# Patient Record
Sex: FEMALE | Race: WHITE | Employment: OTHER | URBAN - METROPOLITAN AREA
[De-identification: names, ages, dates, MRNs, and addresses within clinical notes are randomized per-mention and may not be internally consistent; named-entity substitution may affect disease eponyms.]

---

## 2018-07-06 DIAGNOSIS — I10 ESSENTIAL HYPERTENSION: ICD-10-CM

## 2018-07-06 DIAGNOSIS — E78.00 PURE HYPERCHOLESTEROLEMIA: ICD-10-CM

## 2018-07-06 RX ORDER — PRAVASTATIN SODIUM 20 MG
TABLET ORAL
Qty: 90 TABLET | Refills: 3 | Status: SHIPPED | OUTPATIENT
Start: 2018-07-06 | End: 2018-07-09 | Stop reason: SDUPTHER

## 2018-07-06 RX ORDER — VALSARTAN AND HYDROCHLOROTHIAZIDE 160; 12.5 MG/1; MG/1
TABLET, FILM COATED ORAL
Qty: 90 TABLET | Refills: 3 | Status: SHIPPED | OUTPATIENT
Start: 2018-07-06 | End: 2018-07-09 | Stop reason: SDUPTHER

## 2018-07-09 ENCOUNTER — TELEPHONE (OUTPATIENT)
Dept: INTERNAL MEDICINE CLINIC | Age: 78
End: 2018-07-09

## 2018-07-09 DIAGNOSIS — I10 ESSENTIAL HYPERTENSION: ICD-10-CM

## 2018-07-09 DIAGNOSIS — E78.00 PURE HYPERCHOLESTEROLEMIA: ICD-10-CM

## 2018-07-09 RX ORDER — PRAVASTATIN SODIUM 20 MG
TABLET ORAL
Qty: 90 TABLET | Refills: 3 | Status: SHIPPED | OUTPATIENT
Start: 2018-07-09 | End: 2019-09-04 | Stop reason: SDUPTHER

## 2018-07-09 RX ORDER — VALSARTAN AND HYDROCHLOROTHIAZIDE 160; 12.5 MG/1; MG/1
TABLET, FILM COATED ORAL
Qty: 90 TABLET | Refills: 3 | Status: SHIPPED | OUTPATIENT
Start: 2018-07-09 | End: 2018-07-19 | Stop reason: SINTOL

## 2018-07-12 ENCOUNTER — HOSPITAL ENCOUNTER (OUTPATIENT)
Dept: OTHER | Age: 78
Discharge: HOME | End: 2018-07-12
Attending: INTERNAL MEDICINE | Admitting: INTERNAL MEDICINE

## 2018-07-12 LAB
A/G RATIO: 1.6 (ref 1.1–2.2)
ALBUMIN SERPL-MCNC: 4.2 G/DL (ref 3.4–5)
ALP BLD-CCNC: 63 U/L (ref 40–129)
ALT SERPL-CCNC: 14 U/L (ref 10–40)
ANION GAP SERPL CALCULATED.3IONS-SCNC: 13 MMOL/L (ref 3–16)
AST SERPL-CCNC: 15 U/L (ref 15–37)
BILIRUB SERPL-MCNC: 0.4 MG/DL (ref 0–1)
BUN BLDV-MCNC: 22 MG/DL (ref 7–20)
CALCIUM SERPL-MCNC: 9.5 MG/DL (ref 8.3–10.6)
CHLORIDE BLD-SCNC: 100 MMOL/L (ref 99–110)
CHOLESTEROL, TOTAL: 179 MG/DL (ref 0–199)
CO2: 28 MMOL/L (ref 21–32)
CREAT SERPL-MCNC: 0.8 MG/DL (ref 0.6–1.2)
GFR AFRICAN AMERICAN: >60
GFR NON-AFRICAN AMERICAN: >60
GLOBULIN: 2.7 G/DL
GLUCOSE BLD-MCNC: 109 MG/DL (ref 70–99)
HCT VFR BLD CALC: 42.5 % (ref 36–48)
HDLC SERPL-MCNC: 72 MG/DL (ref 40–60)
HEMOGLOBIN: 15 G/DL (ref 12–16)
LDL CHOLESTEROL CALCULATED: 86 MG/DL
MCH RBC QN AUTO: 33.5 PG (ref 26–34)
MCHC RBC AUTO-ENTMCNC: 35.3 G/DL (ref 31–36)
MCV RBC AUTO: 95.1 FL (ref 80–100)
PDW BLD-RTO: 13.7 % (ref 12.4–15.4)
PLATELET # BLD: 265 K/UL (ref 135–450)
PMV BLD AUTO: 9.5 FL (ref 5–10.5)
POTASSIUM SERPL-SCNC: 3.8 MMOL/L (ref 3.5–5.1)
RBC # BLD: 4.47 M/UL (ref 4–5.2)
SODIUM BLD-SCNC: 141 MMOL/L (ref 136–145)
TOTAL PROTEIN: 6.9 G/DL (ref 6.4–8.2)
TRIGL SERPL-MCNC: 106 MG/DL (ref 0–150)
VLDLC SERPL CALC-MCNC: 21 MG/DL
WBC # BLD: 7.6 K/UL (ref 4–11)

## 2018-07-19 ENCOUNTER — OFFICE VISIT (OUTPATIENT)
Dept: INTERNAL MEDICINE CLINIC | Age: 78
End: 2018-07-19

## 2018-07-19 VITALS
WEIGHT: 152 LBS | SYSTOLIC BLOOD PRESSURE: 138 MMHG | HEIGHT: 62 IN | HEART RATE: 68 BPM | BODY MASS INDEX: 27.97 KG/M2 | DIASTOLIC BLOOD PRESSURE: 74 MMHG

## 2018-07-19 DIAGNOSIS — I10 ESSENTIAL HYPERTENSION: Primary | ICD-10-CM

## 2018-07-19 DIAGNOSIS — E78.00 PURE HYPERCHOLESTEROLEMIA: ICD-10-CM

## 2018-07-19 PROCEDURE — 99214 OFFICE O/P EST MOD 30 MIN: CPT | Performed by: INTERNAL MEDICINE

## 2018-07-19 RX ORDER — LISINOPRIL AND HYDROCHLOROTHIAZIDE 25; 20 MG/1; MG/1
1 TABLET ORAL DAILY
Qty: 30 TABLET | Refills: 3 | Status: SHIPPED | OUTPATIENT
Start: 2018-07-19 | End: 2018-08-17 | Stop reason: SDUPTHER

## 2018-07-19 ASSESSMENT — PATIENT HEALTH QUESTIONNAIRE - PHQ9
2. FEELING DOWN, DEPRESSED OR HOPELESS: 0
1. LITTLE INTEREST OR PLEASURE IN DOING THINGS: 0
SUM OF ALL RESPONSES TO PHQ9 QUESTIONS 1 & 2: 0
SUM OF ALL RESPONSES TO PHQ QUESTIONS 1-9: 0

## 2018-07-19 NOTE — PROGRESS NOTES
07/12/2018     07/12/2018    CO2 28 07/12/2018    BUN 22 (H) 07/12/2018    CREATININE 0.8 07/12/2018    GLUCOSE 109 (H) 07/12/2018    CALCIUM 9.5 07/12/2018    PROT 6.9 07/12/2018    LABALBU 4.2 07/12/2018    BILITOT 0.4 07/12/2018    ALKPHOS 63 07/12/2018    AST 15 07/12/2018    ALT 14 07/12/2018    LABGLOM >60 07/12/2018    GFRAA >60 07/12/2018    AGRATIO 1.6 07/12/2018    GLOB 2.7 07/12/2018       Lab Results   Component Value Date    CHOL 179 07/12/2018    CHOL 181 07/26/2016    CHOL 161 11/20/2015     Lab Results   Component Value Date    TRIG 106 07/12/2018    TRIG 86 07/26/2016    TRIG 78 11/20/2015     Lab Results   Component Value Date    HDL 72 (H) 07/12/2018    HDL 68 (H) 07/26/2016    HDL 68 (H) 11/20/2015     Lab Results   Component Value Date    LDLCALC 86 07/12/2018    LDLCALC 96 07/26/2016    LDLCALC 77 11/20/2015     Lab Results   Component Value Date    LABVLDL 21 07/12/2018    LABVLDL 17 07/26/2016    LABVLDL 16 11/20/2015     No results found for: CHOLHDLRATIO    Assessment:      Patient Active Problem List   Diagnosis    Pure hypercholesterolemia    Essential hypertension    Colon cancer screening    Encounter for routine gynecological examination    Breast cancer screening    Osteoporosis screening           Plan:       Advised pt to check with her pharmacy provider to see if the valsartan recall affects her meds  Pharmacy advises switch, so will use lisinopril HCT instead    BP check only in 2 weeks    F/u in 1 month   has 4 daughters are who are going to be nurses    Jesse Sood    HM: UTD  Annual fluvax everyFall  Consider Shingrix  tdap booster advised  Prevnar-13 advised

## 2018-07-19 NOTE — PATIENT INSTRUCTIONS
When Lisinopril HCT arrives, stop taking valsartan-HCTZ    BP check only in 2 weeks    F/u visit in 4 weeks to discuss the new med

## 2018-08-17 ENCOUNTER — TELEPHONE (OUTPATIENT)
Dept: INTERNAL MEDICINE CLINIC | Age: 78
End: 2018-08-17

## 2018-08-17 DIAGNOSIS — I10 ESSENTIAL HYPERTENSION: ICD-10-CM

## 2018-08-17 RX ORDER — LISINOPRIL AND HYDROCHLOROTHIAZIDE 25; 20 MG/1; MG/1
1 TABLET ORAL DAILY
Qty: 30 TABLET | Refills: 3 | Status: SHIPPED | OUTPATIENT
Start: 2018-08-17 | End: 2018-08-29 | Stop reason: SDUPTHER

## 2018-08-17 NOTE — TELEPHONE ENCOUNTER
Pt needs 30 day Rx of Lisinopril HCT sent to [facility] on [address]. There was a problem with [facility] and pt needs a refill in 3 days.

## 2018-08-29 ENCOUNTER — OFFICE VISIT (OUTPATIENT)
Dept: INTERNAL MEDICINE CLINIC | Age: 78
End: 2018-08-29

## 2018-08-29 VITALS
SYSTOLIC BLOOD PRESSURE: 132 MMHG | HEIGHT: 62 IN | DIASTOLIC BLOOD PRESSURE: 70 MMHG | BODY MASS INDEX: 27.6 KG/M2 | WEIGHT: 150 LBS | HEART RATE: 84 BPM

## 2018-08-29 DIAGNOSIS — I10 ESSENTIAL HYPERTENSION: ICD-10-CM

## 2018-08-29 PROCEDURE — 99213 OFFICE O/P EST LOW 20 MIN: CPT | Performed by: INTERNAL MEDICINE

## 2018-08-29 RX ORDER — LISINOPRIL AND HYDROCHLOROTHIAZIDE 25; 20 MG/1; MG/1
1 TABLET ORAL DAILY
Qty: 90 TABLET | Refills: 3 | Status: SHIPPED | OUTPATIENT
Start: 2018-08-29 | End: 2019-09-04 | Stop reason: SDUPTHER

## 2018-08-29 NOTE — PROGRESS NOTES
07/12/2018     07/12/2018     Lab Results   Component Value Date     07/12/2018    K 3.8 07/12/2018     07/12/2018    CO2 28 07/12/2018    BUN 22 (H) 07/12/2018    CREATININE 0.8 07/12/2018    GLUCOSE 109 (H) 07/12/2018    CALCIUM 9.5 07/12/2018    PROT 6.9 07/12/2018    LABALBU 4.2 07/12/2018    BILITOT 0.4 07/12/2018    ALKPHOS 63 07/12/2018    AST 15 07/12/2018    ALT 14 07/12/2018    LABGLOM >60 07/12/2018    GFRAA >60 07/12/2018    AGRATIO 1.6 07/12/2018    GLOB 2.7 07/12/2018       Lab Results   Component Value Date    CHOL 179 07/12/2018    CHOL 181 07/26/2016    CHOL 161 11/20/2015     Lab Results   Component Value Date    TRIG 106 07/12/2018    TRIG 86 07/26/2016    TRIG 78 11/20/2015     Lab Results   Component Value Date    HDL 72 (H) 07/12/2018    HDL 68 (H) 07/26/2016    HDL 68 (H) 11/20/2015     Lab Results   Component Value Date    LDLCALC 86 07/12/2018    LDLCALC 96 07/26/2016    LDLCALC 77 11/20/2015     Lab Results   Component Value Date    LABVLDL 21 07/12/2018    LABVLDL 17 07/26/2016    LABVLDL 16 11/20/2015     No results found for: CHOLHDLRATIO      Assessment:      Patient Active Problem List   Diagnosis    Pure hypercholesterolemia    Essential hypertension    Colon cancer screening    Encounter for routine gynecological examination    Breast cancer screening    Osteoporosis screening           Plan:      Continue current meds  Wants 90 day Prinzide to [facility]      High fiber diet for constipation, doubt that Lisinopril would affect bowel function    F/u in 5 months with labs prior as planned    Neisha Coyne MD

## 2019-08-16 ENCOUNTER — TELEPHONE (OUTPATIENT)
Dept: FAMILY MEDICINE CLINIC | Age: 79
End: 2019-08-16

## 2019-09-04 DIAGNOSIS — I10 ESSENTIAL HYPERTENSION: ICD-10-CM

## 2019-09-04 DIAGNOSIS — E78.00 PURE HYPERCHOLESTEROLEMIA: ICD-10-CM

## 2019-09-04 RX ORDER — PRAVASTATIN SODIUM 20 MG
TABLET ORAL
Qty: 90 TABLET | Refills: 3 | Status: SHIPPED | OUTPATIENT
Start: 2019-09-04 | End: 2020-08-31

## 2019-09-04 RX ORDER — LISINOPRIL AND HYDROCHLOROTHIAZIDE 25; 20 MG/1; MG/1
1 TABLET ORAL DAILY
Qty: 90 TABLET | Refills: 3 | Status: SHIPPED | OUTPATIENT
Start: 2019-09-04 | End: 2020-08-31

## 2019-09-05 ENCOUNTER — HOSPITAL ENCOUNTER (OUTPATIENT)
Age: 79
Discharge: HOME | End: 2019-09-05
Payer: MEDICARE

## 2019-09-05 DIAGNOSIS — E78.00 PURE HYPERCHOLESTEROLEMIA: ICD-10-CM

## 2019-09-05 DIAGNOSIS — I10 ESSENTIAL HYPERTENSION: ICD-10-CM

## 2019-09-05 LAB
A/G RATIO: 1.4 (ref 1.1–2.2)
ALBUMIN SERPL-MCNC: 4.2 G/DL (ref 3.4–5)
ALP BLD-CCNC: 62 U/L (ref 40–129)
ALT SERPL-CCNC: 15 U/L (ref 10–40)
ANION GAP SERPL CALCULATED.3IONS-SCNC: 15 MMOL/L (ref 3–16)
AST SERPL-CCNC: 21 U/L (ref 15–37)
BILIRUB SERPL-MCNC: 0.5 MG/DL (ref 0–1)
BUN BLDV-MCNC: 24 MG/DL (ref 7–20)
CALCIUM SERPL-MCNC: 10.5 MG/DL (ref 8.3–10.6)
CHLORIDE BLD-SCNC: 97 MMOL/L (ref 99–110)
CHOLESTEROL, FASTING: 184 MG/DL (ref 0–199)
CO2: 27 MMOL/L (ref 21–32)
CREAT SERPL-MCNC: 0.8 MG/DL (ref 0.6–1.2)
GFR AFRICAN AMERICAN: >60
GFR NON-AFRICAN AMERICAN: >60
GLOBULIN: 3.1 G/DL
GLUCOSE FASTING: 127 MG/DL (ref 70–99)
HCT VFR BLD CALC: 43.1 % (ref 36–48)
HDLC SERPL-MCNC: 68 MG/DL (ref 40–60)
HEMOGLOBIN: 14.7 G/DL (ref 12–16)
LDL CHOLESTEROL CALCULATED: 97 MG/DL
MCH RBC QN AUTO: 33.1 PG (ref 26–34)
MCHC RBC AUTO-ENTMCNC: 34.1 G/DL (ref 31–36)
MCV RBC AUTO: 97 FL (ref 80–100)
PDW BLD-RTO: 13.7 % (ref 12.4–15.4)
PLATELET # BLD: 286 K/UL (ref 135–450)
PMV BLD AUTO: 9.3 FL (ref 5–10.5)
POTASSIUM SERPL-SCNC: 3.4 MMOL/L (ref 3.5–5.1)
RBC # BLD: 4.44 M/UL (ref 4–5.2)
SODIUM BLD-SCNC: 139 MMOL/L (ref 136–145)
TOTAL PROTEIN: 7.3 G/DL (ref 6.4–8.2)
TRIGLYCERIDE, FASTING: 93 MG/DL (ref 0–150)
VLDLC SERPL CALC-MCNC: 19 MG/DL
WBC # BLD: 7.4 K/UL (ref 4–11)

## 2019-09-05 PROCEDURE — 80061 LIPID PANEL: CPT

## 2019-09-05 PROCEDURE — 36415 COLL VENOUS BLD VENIPUNCTURE: CPT

## 2019-09-05 PROCEDURE — 80053 COMPREHEN METABOLIC PANEL: CPT

## 2019-09-05 PROCEDURE — 85027 COMPLETE CBC AUTOMATED: CPT

## 2019-09-17 ENCOUNTER — OFFICE VISIT (OUTPATIENT)
Dept: PRIMARY CARE CLINIC | Age: 79
End: 2019-09-17
Payer: MEDICARE

## 2019-09-17 VITALS
WEIGHT: 147.6 LBS | SYSTOLIC BLOOD PRESSURE: 136 MMHG | DIASTOLIC BLOOD PRESSURE: 84 MMHG | OXYGEN SATURATION: 98 % | HEIGHT: 61 IN | HEART RATE: 81 BPM | TEMPERATURE: 97.8 F | BODY MASS INDEX: 27.87 KG/M2

## 2019-09-17 DIAGNOSIS — E78.00 PURE HYPERCHOLESTEROLEMIA: Primary | ICD-10-CM

## 2019-09-17 DIAGNOSIS — I10 ESSENTIAL HYPERTENSION: ICD-10-CM

## 2019-09-17 PROCEDURE — 99213 OFFICE O/P EST LOW 20 MIN: CPT | Performed by: INTERNAL MEDICINE

## 2019-09-17 ASSESSMENT — PATIENT HEALTH QUESTIONNAIRE - PHQ9
SUM OF ALL RESPONSES TO PHQ QUESTIONS 1-9: 0
2. FEELING DOWN, DEPRESSED OR HOPELESS: 0
1. LITTLE INTEREST OR PLEASURE IN DOING THINGS: 0
SUM OF ALL RESPONSES TO PHQ QUESTIONS 1-9: 0
SUM OF ALL RESPONSES TO PHQ9 QUESTIONS 1 & 2: 0

## 2020-08-31 RX ORDER — LISINOPRIL AND HYDROCHLOROTHIAZIDE 25; 20 MG/1; MG/1
TABLET ORAL
Qty: 90 TABLET | Refills: 3 | Status: SHIPPED | OUTPATIENT
Start: 2020-08-31 | End: 2021-07-20

## 2020-08-31 RX ORDER — PRAVASTATIN SODIUM 20 MG
TABLET ORAL
Qty: 90 TABLET | Refills: 3 | Status: SHIPPED | OUTPATIENT
Start: 2020-08-31 | End: 2021-07-20

## 2021-06-30 ENCOUNTER — TELEPHONE (OUTPATIENT)
Dept: PRIMARY CARE CLINIC | Age: 81
End: 2021-06-30

## 2021-07-20 DIAGNOSIS — I10 ESSENTIAL HYPERTENSION: ICD-10-CM

## 2021-07-20 DIAGNOSIS — E78.00 PURE HYPERCHOLESTEROLEMIA: ICD-10-CM

## 2021-07-20 RX ORDER — PRAVASTATIN SODIUM 20 MG
TABLET ORAL
Qty: 90 TABLET | Refills: 3 | Status: SHIPPED | OUTPATIENT
Start: 2021-07-20

## 2021-07-20 RX ORDER — LISINOPRIL AND HYDROCHLOROTHIAZIDE 25; 20 MG/1; MG/1
TABLET ORAL
Qty: 90 TABLET | Refills: 3 | Status: SHIPPED | OUTPATIENT
Start: 2021-07-20

## 2022-07-15 DIAGNOSIS — E78.00 PURE HYPERCHOLESTEROLEMIA: ICD-10-CM

## 2022-07-15 DIAGNOSIS — I10 ESSENTIAL HYPERTENSION: ICD-10-CM

## 2022-07-15 RX ORDER — LISINOPRIL AND HYDROCHLOROTHIAZIDE 25; 20 MG/1; MG/1
TABLET ORAL
Qty: 90 TABLET | Refills: 3 | OUTPATIENT
Start: 2022-07-15

## 2022-07-15 RX ORDER — PRAVASTATIN SODIUM 20 MG
TABLET ORAL
Qty: 90 TABLET | Refills: 3 | OUTPATIENT
Start: 2022-07-15

## 2023-03-10 ENCOUNTER — OFFICE VISIT (OUTPATIENT)
Dept: INTERNAL MEDICINE CLINIC | Age: 83
End: 2023-03-10
Payer: MEDICARE

## 2023-03-10 VITALS
HEIGHT: 61 IN | SYSTOLIC BLOOD PRESSURE: 150 MMHG | BODY MASS INDEX: 26.96 KG/M2 | WEIGHT: 142.8 LBS | DIASTOLIC BLOOD PRESSURE: 70 MMHG | HEART RATE: 94 BPM | OXYGEN SATURATION: 99 %

## 2023-03-10 DIAGNOSIS — I10 ESSENTIAL HYPERTENSION: ICD-10-CM

## 2023-03-10 DIAGNOSIS — H61.21 IMPACTED CERUMEN OF RIGHT EAR: ICD-10-CM

## 2023-03-10 DIAGNOSIS — Z12.39 BREAST SCREENING: ICD-10-CM

## 2023-03-10 DIAGNOSIS — H18.521: ICD-10-CM

## 2023-03-10 DIAGNOSIS — I10 ESSENTIAL HYPERTENSION: Primary | ICD-10-CM

## 2023-03-10 DIAGNOSIS — R01.1 HEART MURMUR: ICD-10-CM

## 2023-03-10 DIAGNOSIS — E78.00 PURE HYPERCHOLESTEROLEMIA: ICD-10-CM

## 2023-03-10 LAB
HCT VFR BLD CALC: 47 % (ref 36–48)
HEMOGLOBIN: 16 G/DL (ref 12–16)
MCH RBC QN AUTO: 32.2 PG (ref 26–34)
MCHC RBC AUTO-ENTMCNC: 34 G/DL (ref 31–36)
MCV RBC AUTO: 95 FL (ref 80–100)
PDW BLD-RTO: 13.6 % (ref 12.4–15.4)
PLATELET # BLD: 275 K/UL (ref 135–450)
PMV BLD AUTO: 9.3 FL (ref 5–10.5)
RBC # BLD: 4.95 M/UL (ref 4–5.2)
WBC # BLD: 7.3 K/UL (ref 4–11)

## 2023-03-10 PROCEDURE — 3078F DIAST BP <80 MM HG: CPT | Performed by: INTERNAL MEDICINE

## 2023-03-10 PROCEDURE — 3077F SYST BP >= 140 MM HG: CPT | Performed by: INTERNAL MEDICINE

## 2023-03-10 PROCEDURE — 69210 REMOVE IMPACTED EAR WAX UNI: CPT | Performed by: INTERNAL MEDICINE

## 2023-03-10 PROCEDURE — 1123F ACP DISCUSS/DSCN MKR DOCD: CPT | Performed by: INTERNAL MEDICINE

## 2023-03-10 PROCEDURE — 99204 OFFICE O/P NEW MOD 45 MIN: CPT | Performed by: INTERNAL MEDICINE

## 2023-03-10 RX ORDER — ANTIARTHRITIC COMBINATION NO.2 900 MG
TABLET ORAL DAILY
COMMUNITY

## 2023-03-10 RX ORDER — PRAVASTATIN SODIUM 20 MG
20 TABLET ORAL DAILY
Qty: 90 TABLET | Refills: 1 | Status: SHIPPED | OUTPATIENT
Start: 2023-03-10

## 2023-03-10 RX ORDER — HYDROCHLOROTHIAZIDE 25 MG/1
25 TABLET ORAL EVERY MORNING
Qty: 90 TABLET | Refills: 1 | Status: SHIPPED | OUTPATIENT
Start: 2023-03-10

## 2023-03-10 RX ORDER — LISINOPRIL 20 MG/1
20 TABLET ORAL DAILY
Qty: 90 TABLET | Refills: 1 | Status: SHIPPED | OUTPATIENT
Start: 2023-03-10

## 2023-03-10 SDOH — ECONOMIC STABILITY: HOUSING INSECURITY
IN THE LAST 12 MONTHS, WAS THERE A TIME WHEN YOU DID NOT HAVE A STEADY PLACE TO SLEEP OR SLEPT IN A SHELTER (INCLUDING NOW)?: NO

## 2023-03-10 SDOH — ECONOMIC STABILITY: FOOD INSECURITY: WITHIN THE PAST 12 MONTHS, THE FOOD YOU BOUGHT JUST DIDN'T LAST AND YOU DIDN'T HAVE MONEY TO GET MORE.: NEVER TRUE

## 2023-03-10 SDOH — ECONOMIC STABILITY: INCOME INSECURITY: HOW HARD IS IT FOR YOU TO PAY FOR THE VERY BASICS LIKE FOOD, HOUSING, MEDICAL CARE, AND HEATING?: NOT HARD AT ALL

## 2023-03-10 SDOH — ECONOMIC STABILITY: FOOD INSECURITY: WITHIN THE PAST 12 MONTHS, YOU WORRIED THAT YOUR FOOD WOULD RUN OUT BEFORE YOU GOT MONEY TO BUY MORE.: NEVER TRUE

## 2023-03-10 ASSESSMENT — ENCOUNTER SYMPTOMS
BACK PAIN: 0
WHEEZING: 0
SORE THROAT: 0
NAUSEA: 0
ABDOMINAL PAIN: 0
COUGH: 0
SHORTNESS OF BREATH: 0
VOMITING: 0
CONSTIPATION: 0
CHEST TIGHTNESS: 0
COLOR CHANGE: 0

## 2023-03-10 ASSESSMENT — PATIENT HEALTH QUESTIONNAIRE - PHQ9
1. LITTLE INTEREST OR PLEASURE IN DOING THINGS: 0
SUM OF ALL RESPONSES TO PHQ QUESTIONS 1-9: 1
SUM OF ALL RESPONSES TO PHQ QUESTIONS 1-9: 1
2. FEELING DOWN, DEPRESSED OR HOPELESS: 1
SUM OF ALL RESPONSES TO PHQ QUESTIONS 1-9: 1
SUM OF ALL RESPONSES TO PHQ QUESTIONS 1-9: 1
SUM OF ALL RESPONSES TO PHQ9 QUESTIONS 1 & 2: 1

## 2023-03-10 NOTE — ASSESSMENT & PLAN NOTE
Has been on low-dose Pravachol, will update labs and make adjustment to dose accordingly, reinforced diet and exercise recommendations

## 2023-03-10 NOTE — ASSESSMENT & PLAN NOTE
Murmur audible on exam, patient not aware of previous history of murmur, she is asymptomatic and denies any dizzy spells, dyspnea on exertion or palpitations. Advised will check echocardiogram to evaluate for valvular insufficiency as an underlying etiology for her murmur in addition to updating lab work.

## 2023-03-10 NOTE — ASSESSMENT & PLAN NOTE
Blood pressure checked twice with elevated systolic, patient reports has not had any medication for over a week, requesting to separate hydrochlorothiazide and lisinopril, new order sent to pharmacy, reinforced recommendations for salt restriction, maintaining healthy low-fat low-carb diet with regular exercise and active lifestyle, will update lab work, compliance with meds and follow-up visit reinforced, will reevaluate in 6 weeks as she will return for a wellness exam

## 2023-03-10 NOTE — PROGRESS NOTES
ASSESSMENT/PLAN:  1. Essential hypertension  Assessment & Plan:   Blood pressure checked twice with elevated systolic, patient reports has not had any medication for over a week, requesting to separate hydrochlorothiazide and lisinopril, new order sent to pharmacy, reinforced recommendations for salt restriction, maintaining healthy low-fat low-carb diet with regular exercise and active lifestyle, will update lab work, compliance with meds and follow-up visit reinforced, will reevaluate in 6 weeks as she will return for a wellness exam  Orders:  -     CBC; Future  -     Comprehensive Metabolic Panel; Future  -     Lipid Panel; Future  -     TSH with Reflex to FT4; Future  -     lisinopril (PRINIVIL;ZESTRIL) 20 MG tablet; Take 1 tablet by mouth daily, Disp-90 tablet, R-1Normal  -     hydroCHLOROthiazide (HYDRODIURIL) 25 MG tablet; Take 1 tablet by mouth every morning, Disp-90 tablet, R-1Normal  -     Echocardiogram complete; Future  2. Pure hypercholesterolemia  Assessment & Plan:   Has been on low-dose Pravachol, will update labs and make adjustment to dose accordingly, reinforced diet and exercise recommendations  Orders:  -     CBC; Future  -     Comprehensive Metabolic Panel; Future  -     Lipid Panel; Future  -     TSH with Reflex to FT4; Future  -     pravastatin (PRAVACHOL) 20 MG tablet; Take 1 tablet by mouth daily, Disp-90 tablet, R-1Normal  3. Heart murmur  Assessment & Plan:   Murmur audible on exam, patient not aware of previous history of murmur, she is asymptomatic and denies any dizzy spells, dyspnea on exertion or palpitations. Advised will check echocardiogram to evaluate for valvular insufficiency as an underlying etiology for her murmur in addition to updating lab work. Orders:  -     Echocardiogram complete; Future  4. Impacted cerumen of right ear  Assessment & Plan:  Ear lavage done and big chunk of wax removed with aid of lighted curette.   Advised patient can use over-the-counter Debrox to prevent further buildup and future recurrence, advised if concerned there is deeper wax then needs to see ENT    Orders:  -     [id number] - [place] REMOVE IMPACTED EAR WAX  5. Map-dot-fingerprint corneal dystrophy of right eye  Assessment & Plan:   Continue care and recommendation as per ophthalmology  6. Breast screening  -     AJRED REY DIGITAL SCREEN BILATERAL; Future    Return in about 6 weeks (around 4/21/2023) for AWV. SUBJECTIVE  HPI:   Patient here to establish new PCP office visits, used to see Dr. Julio Drew at [facility] location however has not seen him since 2019. She is an 19-year-old female, she is , lives by herself, she is independent with all of her ADLs and taking care of her household, reports she used to going to the doctor when the pandemic started, she was maintained on blood pressure and cholesterol pills and she kept getting refills up till recently when she was told no more refills will be given until she is seen. She states she has been taking her blood pressure pills on and off to stretch them to last until she finds a new doctor  She is a retired , she is generally in good health and denies any concerns. She has been following up with ophthalmology for corneal dystrophy, she had cataract surgery done in the past..  Reports usually her blood pressure is high at the doctor's office due to whitecoat syndrome      Review of Systems   Constitutional:  Negative for activity change, appetite change, fatigue and unexpected weight change. HENT:  Negative for congestion, hearing loss, mouth sores and sore throat. Eyes:  Positive for visual disturbance. Respiratory:  Negative for cough, chest tightness, shortness of breath and wheezing. Cardiovascular:  Negative for chest pain, palpitations and leg swelling. Gastrointestinal:  Negative for abdominal pain, constipation, nausea and vomiting.    Genitourinary:  Negative for difficulty urinating, dysuria, frequency, hematuria, urgency, vaginal bleeding and vaginal discharge. Musculoskeletal:  Negative for arthralgias, back pain, gait problem and joint swelling. Skin:  Negative for color change and wound. Allergic/Immunologic: Negative for environmental allergies and immunocompromised state. Neurological:  Negative for dizziness, speech difficulty, weakness, light-headedness and headaches. Psychiatric/Behavioral:  Negative for behavioral problems, dysphoric mood and sleep disturbance. The patient is not nervous/anxious. OBJECTIVE:    BP (!) 150/70   Pulse 94   Ht 5' 1\" (1.549 m)   Wt 142 lb 12.8 oz (64.8 kg)   SpO2 99%   BMI 26.98 kg/m²    Physical Exam  Constitutional:       General: She is not in acute distress. Appearance: Normal appearance. She is normal weight. She is not toxic-appearing. HENT:      Head: Normocephalic. Right Ear: Tympanic membrane and ear canal normal. There is impacted cerumen. Left Ear: Tympanic membrane and ear canal normal.      Nose: Nose normal.      Mouth/Throat:      Mouth: Mucous membranes are moist.      Pharynx: Oropharynx is clear. Eyes:      Extraocular Movements: Extraocular movements intact. Conjunctiva/sclera: Conjunctivae normal.      Pupils: Pupils are equal, round, and reactive to light. Cardiovascular:      Rate and Rhythm: Normal rate and regular rhythm. Pulses: Normal pulses. Heart sounds: Murmur heard. Pulmonary:      Effort: Pulmonary effort is normal. No respiratory distress. Breath sounds: Normal breath sounds. No wheezing. Chest:      Chest wall: No tenderness. Abdominal:      General: Bowel sounds are normal.      Palpations: Abdomen is soft. There is no mass. Tenderness: There is no abdominal tenderness. There is no rebound. Musculoskeletal:         General: No swelling, deformity or signs of injury. Normal range of motion. Cervical back: Normal range of motion and neck supple.       Right lower leg: No edema. Left lower leg: No edema. Skin:     General: Skin is warm and dry. Neurological:      General: No focal deficit present. Mental Status: She is alert and oriented to person, place, and time. Mental status is at baseline. Cranial Nerves: No cranial nerve deficit. Gait: Gait normal.   Psychiatric:         Mood and Affect: Mood normal.         Behavior: Behavior normal.         Thought Content: Thought content normal.         Electronically signed by Ronda Mathews MD on 3/10/2023 at 10:50 AM.    This dictation was generated by voice recognition computer software. Although all attempts are made to edit the dictation for accuracy, there may be errors in the transcription that are not intended.

## 2023-03-11 LAB
A/G RATIO: 1.6 (ref 1.1–2.2)
ALBUMIN SERPL-MCNC: 4.6 G/DL (ref 3.4–5)
ALP BLD-CCNC: 84 U/L (ref 40–129)
ALT SERPL-CCNC: 18 U/L (ref 10–40)
ANION GAP SERPL CALCULATED.3IONS-SCNC: 17 MMOL/L (ref 3–16)
AST SERPL-CCNC: 23 U/L (ref 15–37)
BILIRUB SERPL-MCNC: 0.6 MG/DL (ref 0–1)
BUN BLDV-MCNC: 23 MG/DL (ref 7–20)
CALCIUM SERPL-MCNC: 10.4 MG/DL (ref 8.3–10.6)
CHLORIDE BLD-SCNC: 98 MMOL/L (ref 99–110)
CHOLESTEROL, TOTAL: 221 MG/DL (ref 0–199)
CO2: 26 MMOL/L (ref 21–32)
CREAT SERPL-MCNC: 0.8 MG/DL (ref 0.6–1.2)
GFR SERPL CREATININE-BSD FRML MDRD: >60 ML/MIN/{1.73_M2}
GLUCOSE BLD-MCNC: 113 MG/DL (ref 70–99)
HDLC SERPL-MCNC: 86 MG/DL (ref 40–60)
LDL CHOLESTEROL CALCULATED: 114 MG/DL
POTASSIUM SERPL-SCNC: 4.3 MMOL/L (ref 3.5–5.1)
SODIUM BLD-SCNC: 141 MMOL/L (ref 136–145)
TOTAL PROTEIN: 7.5 G/DL (ref 6.4–8.2)
TRIGL SERPL-MCNC: 105 MG/DL (ref 0–150)
TSH REFLEX FT4: 1.26 UIU/ML (ref 0.27–4.2)
VLDLC SERPL CALC-MCNC: 21 MG/DL

## 2023-04-24 ENCOUNTER — OFFICE VISIT (OUTPATIENT)
Dept: INTERNAL MEDICINE CLINIC | Age: 83
End: 2023-04-24
Payer: MEDICARE

## 2023-04-24 VITALS
BODY MASS INDEX: 27.3 KG/M2 | HEART RATE: 89 BPM | WEIGHT: 144.6 LBS | SYSTOLIC BLOOD PRESSURE: 160 MMHG | HEIGHT: 61 IN | DIASTOLIC BLOOD PRESSURE: 75 MMHG | OXYGEN SATURATION: 99 %

## 2023-04-24 DIAGNOSIS — I10 ESSENTIAL HYPERTENSION: ICD-10-CM

## 2023-04-24 DIAGNOSIS — Z00.00 ENCOUNTER FOR SUBSEQUENT ANNUAL WELLNESS VISIT (AWV) IN MEDICARE PATIENT: Primary | ICD-10-CM

## 2023-04-24 DIAGNOSIS — E78.00 PURE HYPERCHOLESTEROLEMIA: ICD-10-CM

## 2023-04-24 DIAGNOSIS — R01.1 HEART MURMUR: ICD-10-CM

## 2023-04-24 DIAGNOSIS — R73.01 IMPAIRED FASTING GLUCOSE: ICD-10-CM

## 2023-04-24 LAB — HBA1C MFR BLD: 5.6 %

## 2023-04-24 PROCEDURE — 1123F ACP DISCUSS/DSCN MKR DOCD: CPT | Performed by: INTERNAL MEDICINE

## 2023-04-24 PROCEDURE — 99214 OFFICE O/P EST MOD 30 MIN: CPT | Performed by: INTERNAL MEDICINE

## 2023-04-24 PROCEDURE — G0439 PPPS, SUBSEQ VISIT: HCPCS | Performed by: INTERNAL MEDICINE

## 2023-04-24 PROCEDURE — 3078F DIAST BP <80 MM HG: CPT | Performed by: INTERNAL MEDICINE

## 2023-04-24 PROCEDURE — 83036 HEMOGLOBIN GLYCOSYLATED A1C: CPT | Performed by: INTERNAL MEDICINE

## 2023-04-24 PROCEDURE — 3077F SYST BP >= 140 MM HG: CPT | Performed by: INTERNAL MEDICINE

## 2023-04-24 RX ORDER — LISINOPRIL 20 MG/1
20 TABLET ORAL 2 TIMES DAILY
Qty: 180 TABLET | Refills: 1 | Status: SHIPPED | OUTPATIENT
Start: 2023-04-24

## 2023-04-24 ASSESSMENT — ENCOUNTER SYMPTOMS
WHEEZING: 0
COLOR CHANGE: 0
CONSTIPATION: 0
ABDOMINAL PAIN: 0
BACK PAIN: 0
NAUSEA: 0
CHEST TIGHTNESS: 0
SHORTNESS OF BREATH: 0
SORE THROAT: 0
COUGH: 0
VOMITING: 0

## 2023-04-24 ASSESSMENT — PATIENT HEALTH QUESTIONNAIRE - PHQ9
1. LITTLE INTEREST OR PLEASURE IN DOING THINGS: 1
SUM OF ALL RESPONSES TO PHQ QUESTIONS 1-9: 2
SUM OF ALL RESPONSES TO PHQ9 QUESTIONS 1 & 2: 2
SUM OF ALL RESPONSES TO PHQ QUESTIONS 1-9: 2
2. FEELING DOWN, DEPRESSED OR HOPELESS: 1

## 2023-04-24 ASSESSMENT — LIFESTYLE VARIABLES
HOW MANY STANDARD DRINKS CONTAINING ALCOHOL DO YOU HAVE ON A TYPICAL DAY: 1 OR 2
HOW OFTEN DO YOU HAVE A DRINK CONTAINING ALCOHOL: MONTHLY OR LESS

## 2023-04-24 NOTE — ASSESSMENT & PLAN NOTE
Results of lipid profile explained to patient, will continue Pravachol for now which she is taking without any significant side effects, diet and exercise recommendations reinforced

## 2023-04-24 NOTE — ASSESSMENT & PLAN NOTE
Results of fasting blood sugar reviewed and discussed with the patient, hemoglobin A1c done in office this visit is 5.6, reinforced recommendations to maintain healthy low-carb diet and active lifestyle, will monitor periodically, no need for medication at this point

## 2023-04-24 NOTE — ASSESSMENT & PLAN NOTE
Suboptimally controlled, advised will increase lisinopril dose to twice a day, patient will continue ambulatory blood pressure checks, she will return in 4 weeks for blood pressure check. Reinforced recommendations to maintain low-salt diet with active lifestyle.   Continue hydrochlorothiazide with lisinopril in a.m. and add p.m. lisinopril

## 2023-04-24 NOTE — PROGRESS NOTES
Medicare Annual Wellness Visit  Name: Mirlande Chau Date: 2023   MRN: [id number] Sex: Female   Age: 79 y.o. Ethnicity: Non- / Non    : 1940 Race: White (non-)      Princess Khan is here for Medicare AWV     Screenings for behavioral, psychosocial and functional/safety risks, and cognitive dysfunction are all negative except as indicated below. These results, as well as other patient data from the [address] form, are documented in Flowsheets linked to this Encounter. Allergies   Allergen Reactions    Cortisone Rash       Prior to Visit Medications    Medication Sig Taking? Authorizing Provider   lisinopril (PRINIVIL;ZESTRIL) 20 MG tablet Take 1 tablet by mouth 2 times daily Yes Reji Cerda MD   Biotin 5000 MCG TABS Take by mouth daily Yes Historical Provider, MD   MAGNESIUM-ZINC PO Take by mouth 2 times daily Yes Historical Provider, MD   CALCIUM CITRATE PO Take by mouth daily Yes Historical Provider, MD   hydroCHLOROthiazide (HYDRODIURIL) 25 MG tablet Take 1 tablet by mouth every morning Yes Reji Cerda MD   pravastatin (PRAVACHOL) 20 MG tablet Take 1 tablet by mouth daily Yes Reji Cerda MD   aspirin 81 MG EC tablet Take 1 tablet by mouth daily Yes Historical Provider, MD   Multiple Vitamins-Minerals (CENTRUM SILVER PO) Take 1 tablet by mouth daily.    Yes Historical Provider, MD       Past Medical History:   Diagnosis Date    Hyperlipidemia     Hypertension      Past Surgical History:   Procedure Laterality Date    EYE SURGERY      TONSILLECTOMY AND ADENOIDECTOMY         Family History   Problem Relation Age of Onset    Heart Disease Mother     Diabetes Mother     High Blood Pressure Mother     Vision Loss Mother     Arthritis Father     Cancer Father     High Blood Pressure Father     Cancer Maternal Aunt 61        breast    High Blood Pressure Maternal Aunt     Cancer Maternal Uncle     High Blood Pressure Maternal Uncle     High

## 2023-05-22 ENCOUNTER — TELEPHONE (OUTPATIENT)
Dept: INTERNAL MEDICINE CLINIC | Age: 83
End: 2023-05-22

## 2023-05-22 ENCOUNTER — NURSE ONLY (OUTPATIENT)
Dept: INTERNAL MEDICINE CLINIC | Age: 83
End: 2023-05-22

## 2023-05-22 VITALS — HEART RATE: 93 BPM | SYSTOLIC BLOOD PRESSURE: 169 MMHG | DIASTOLIC BLOOD PRESSURE: 78 MMHG

## 2023-05-22 DIAGNOSIS — Z01.30 ENCOUNTER FOR CHECK OF BLOOD PRESSURE WHILE ON ORAL CONTRACEPTIVES: Primary | ICD-10-CM

## 2023-05-22 DIAGNOSIS — Z79.3 ENCOUNTER FOR CHECK OF BLOOD PRESSURE WHILE ON ORAL CONTRACEPTIVES: Primary | ICD-10-CM

## 2023-05-22 RX ORDER — AMLODIPINE BESYLATE 5 MG/1
5 TABLET ORAL DAILY
Qty: 30 TABLET | Refills: 0 | Status: SHIPPED | OUTPATIENT
Start: 2023-05-22

## 2023-05-22 NOTE — TELEPHONE ENCOUNTER
I have added amlodipine 5 mg daily as her BP is high. Take in the evening. Will also need to make a f/u with Dr. Александр Corey.

## 2023-05-22 NOTE — TELEPHONE ENCOUNTER
Patient came in today for nurse visit for BP check per Dr. Amaury Christianson recommendations last visit. Readings were 172/70 and 169/78. Patient aware Dr. Beatty Loop out of the office. Please advise.

## 2023-06-05 ENCOUNTER — TELEPHONE (OUTPATIENT)
Dept: INTERNAL MEDICINE CLINIC | Age: 83
End: 2023-06-05

## 2023-06-05 DIAGNOSIS — N64.4 BREAST PAIN, RIGHT: Primary | ICD-10-CM

## 2023-06-05 NOTE — TELEPHONE ENCOUNTER
Josefina Slider calling pt had mammogram screening order but she told them she is having pain in right breast so they need a Diagnostic Mammogram order and Breast US ---Thanks.

## 2023-06-26 ENCOUNTER — HOSPITAL ENCOUNTER (OUTPATIENT)
Dept: NON INVASIVE DIAGNOSTICS | Age: 83
Discharge: HOME | End: 2023-06-26
Payer: MEDICARE

## 2023-06-26 ENCOUNTER — TELEPHONE (OUTPATIENT)
Dept: INTERNAL MEDICINE CLINIC | Age: 83
End: 2023-06-26

## 2023-06-26 DIAGNOSIS — R01.1 HEART MURMUR: Primary | ICD-10-CM

## 2023-06-26 DIAGNOSIS — R01.1 HEART MURMUR: ICD-10-CM

## 2023-06-26 LAB
LV EF: 63 %
LVEF MODALITY: NORMAL

## 2023-06-26 PROCEDURE — 93306 TTE W/DOPPLER COMPLETE: CPT

## 2023-06-27 ENCOUNTER — TELEPHONE (OUTPATIENT)
Dept: INTERNAL MEDICINE CLINIC | Age: 83
End: 2023-06-27

## 2023-06-27 DIAGNOSIS — N64.59 OTHER SIGNS AND SYMPTOMS IN BREAST: ICD-10-CM

## 2023-06-27 DIAGNOSIS — N63.0 MASS OF BREAST, UNSPECIFIED LATERALITY: Primary | ICD-10-CM

## 2023-06-28 ENCOUNTER — HOSPITAL ENCOUNTER (OUTPATIENT)
Dept: WOMENS IMAGING | Age: 83
Discharge: HOME | End: 2023-06-28
Payer: MEDICARE

## 2023-06-28 ENCOUNTER — TELEPHONE (OUTPATIENT)
Dept: WOMENS IMAGING | Age: 83
End: 2023-06-28

## 2023-06-28 ENCOUNTER — HOSPITAL ENCOUNTER (OUTPATIENT)
Dept: ULTRASOUND IMAGING | Age: 83
Discharge: HOME | End: 2023-06-28
Payer: MEDICARE

## 2023-06-28 VITALS — HEIGHT: 61 IN | BODY MASS INDEX: 27.19 KG/M2 | WEIGHT: 144 LBS

## 2023-06-28 DIAGNOSIS — N63.0 MASS OF BREAST, UNSPECIFIED LATERALITY: ICD-10-CM

## 2023-06-28 DIAGNOSIS — N64.4 BREAST PAIN, RIGHT: ICD-10-CM

## 2023-06-28 DIAGNOSIS — N63.10 MASS OF RIGHT BREAST, UNSPECIFIED QUADRANT: ICD-10-CM

## 2023-06-28 DIAGNOSIS — R92.8 ABNORMAL MAMMOGRAM OF RIGHT BREAST: Primary | ICD-10-CM

## 2023-06-28 DIAGNOSIS — N64.59 OTHER SIGNS AND SYMPTOMS IN BREAST: ICD-10-CM

## 2023-06-28 PROCEDURE — 76642 ULTRASOUND BREAST LIMITED: CPT

## 2023-06-28 PROCEDURE — 77066 DX MAMMO INCL CAD BI: CPT

## 2023-07-06 ENCOUNTER — TELEPHONE (OUTPATIENT)
Dept: INTERNAL MEDICINE CLINIC | Age: 83
End: 2023-07-06

## 2023-07-06 ENCOUNTER — TELEPHONE (OUTPATIENT)
Dept: WOMENS IMAGING | Age: 83
End: 2023-07-06

## 2023-07-06 DIAGNOSIS — F41.9 ANXIETY: Primary | ICD-10-CM

## 2023-07-06 RX ORDER — LORAZEPAM 0.5 MG/1
0.5 TABLET ORAL EVERY 8 HOURS PRN
Qty: 1 TABLET | Refills: 0 | Status: SHIPPED | OUTPATIENT
Start: 2023-07-06 | End: 2023-08-05

## 2023-07-06 NOTE — TELEPHONE ENCOUNTER
Ativan tablet ordered to pharmacy, please advise patient to take it half an hour before the procedure and to make sure she has someone escorting her home

## 2023-07-11 ENCOUNTER — HOSPITAL ENCOUNTER (OUTPATIENT)
Dept: WOMENS IMAGING | Age: 83
Discharge: HOME | End: 2023-07-11
Payer: MEDICARE

## 2023-07-11 ENCOUNTER — HOSPITAL ENCOUNTER (OUTPATIENT)
Dept: ULTRASOUND IMAGING | Age: 83
Discharge: HOME | End: 2023-07-11
Payer: MEDICARE

## 2023-07-11 DIAGNOSIS — R92.8 ABNORMAL MAMMOGRAM: ICD-10-CM

## 2023-07-11 DIAGNOSIS — R92.8 ABNORMAL MAMMOGRAM OF RIGHT BREAST: ICD-10-CM

## 2023-07-11 DIAGNOSIS — N63.10 MASS OF RIGHT BREAST, UNSPECIFIED QUADRANT: ICD-10-CM

## 2023-07-11 PROCEDURE — 88360 TUMOR IMMUNOHISTOCHEM/MANUAL: CPT

## 2023-07-11 PROCEDURE — 88305 TISSUE EXAM BY PATHOLOGIST: CPT

## 2023-07-11 PROCEDURE — 88341 IMHCHEM/IMCYTCHM EA ADD ANTB: CPT

## 2023-07-11 PROCEDURE — 2500000003 HC RX 250 WO HCPCS: Performed by: INTERNAL MEDICINE

## 2023-07-11 PROCEDURE — A4648 IMPLANTABLE TISSUE MARKER: HCPCS

## 2023-07-11 PROCEDURE — 77065 DX MAMMO INCL CAD UNI: CPT

## 2023-07-11 PROCEDURE — 88342 IMHCHEM/IMCYTCHM 1ST ANTB: CPT

## 2023-07-11 RX ORDER — LIDOCAINE HYDROCHLORIDE AND EPINEPHRINE 10; 10 MG/ML; UG/ML
20 INJECTION, SOLUTION INFILTRATION; PERINEURAL ONCE
Status: DISCONTINUED | OUTPATIENT
Start: 2023-07-11 | End: 2023-07-12 | Stop reason: HOSPADM

## 2023-07-11 RX ORDER — LIDOCAINE HYDROCHLORIDE 10 MG/ML
5 INJECTION, SOLUTION EPIDURAL; INFILTRATION; INTRACAUDAL; PERINEURAL ONCE
Status: COMPLETED | OUTPATIENT
Start: 2023-07-11 | End: 2023-07-11

## 2023-07-11 RX ADMIN — LIDOCAINE HYDROCHLORIDE 20 ML: 10; .005 INJECTION, SOLUTION EPIDURAL; INFILTRATION; INTRACAUDAL; PERINEURAL at 15:03

## 2023-07-11 RX ADMIN — LIDOCAINE HYDROCHLORIDE ANHYDROUS 5 ML: 10 INJECTION, SOLUTION INFILTRATION at 15:01

## 2023-07-11 RX ADMIN — LIDOCAINE HYDROCHLORIDE 5 ML: 10 INJECTION, SOLUTION EPIDURAL; INFILTRATION; INTRACAUDAL; PERINEURAL at 15:02

## 2023-07-11 ASSESSMENT — PAIN DESCRIPTION - LOCATION: LOCATION: BREAST

## 2023-07-11 ASSESSMENT — PAIN DESCRIPTION - DESCRIPTORS: DESCRIPTORS: DISCOMFORT

## 2023-07-11 ASSESSMENT — PAIN DESCRIPTION - ORIENTATION: ORIENTATION: RIGHT

## 2023-07-11 ASSESSMENT — PAIN SCALES - GENERAL: PAINLEVEL_OUTOF10: 3

## 2023-07-14 ENCOUNTER — TELEPHONE (OUTPATIENT)
Dept: WOMENS IMAGING | Age: 83
End: 2023-07-14

## 2023-07-14 NOTE — TELEPHONE ENCOUNTER
Nurse Navigator reviewed breast biopsy with patient and daughter Ava Seip results showing invasive mammary carcinoma on the pathology report. NN explained the terminology and reviewed the results for ER/OK and the additional HER2 test. We discussed several questions and process for establishing a care team and possible additional testing. Patient offered [address] and patient prefers Dr. César Cheung. NN offered additional website reading if interested. Given ACS, [email or web address], NCCN pt, ASCO and [email or web address].  Pt/family given NN phone number for further assistance. Tanya Huertas lives alone, one daughter in town Ava Seip)  she will be at consult. 2 grandchildren. She has no personal hx of cancer; but family hx. She has a twin sister Gabe Redman that lives 5 minutes away. She is a retired teacher. Great personality. Asked great questions.

## 2023-07-25 NOTE — PROGRESS NOTES
In May 2023. PCP:  Medical Oncology:  Radiation:  Other:      [id number]:  IIA right breast cancer      HPI:  Ms. Dunia Lerma is a 79 y.o. woman who presents today with a new diagnosis of grade 2, right sided  invasive mammary carcinoma . ER + LA+ HER2 negative. She states that she has noticed a palpable right breast mass in May 2023. She is not certain if it was there before that. She avoids looking at it in the mirror. Not noticed any new abnormalities such as masses, skin changes, color changes, nipple discharge, or changes to the nipple-areolar complex. She does not remember her last mammogram.  Prior to this she has never had breast related problems and has never required a breast biopsy. She has family history of breast cancer. She is here today in initial consultation to discuss her recent breast diagnosis. She was referred by [facility] radiology. INTERVAL HISTORY:    On 6/28/2023 she underwent diagnostic bilateral breast imaging. The left breast was negative. In the location of her palpable concern at 10:00 there is a 3.5 cm spiculated mass in the upper outer quadrant which is associated with pleomorphic calcifications and long spicules. The total extent of disease appears to be about 7 cm. Ultrasound correlate of the mass itself measures approximately 3.7 x 2 x 3 cm. However again along the spicule the extent of disease measures approximately 7 cm. The longest spicule extends to the nipple areolar complex. The right axilla is negative. BI-RADS 5. On 7/11/2023 she underwent 2 site core needle biopsy of the right breast. [id number]     Pathology of the right breast 10 o'clock position, 7 cm from the nipple identified grade 2 invasive mammary carcinoma. ER positive (>90%) LA positive (>90%) HER2 negative. Pathology of the right breast 10 o'clock position, 1 cm from the nipple identified focal involvement of invasive mammary carcinoma.     Review of Systems   Constitutional:

## 2023-07-31 ENCOUNTER — INITIAL CONSULT (OUTPATIENT)
Dept: SURGERY | Age: 83
End: 2023-07-31
Payer: MEDICARE

## 2023-07-31 VITALS
HEART RATE: 100 BPM | OXYGEN SATURATION: 98 % | WEIGHT: 145 LBS | DIASTOLIC BLOOD PRESSURE: 78 MMHG | HEIGHT: 61 IN | BODY MASS INDEX: 27.38 KG/M2 | RESPIRATION RATE: 18 BRPM | SYSTOLIC BLOOD PRESSURE: 146 MMHG

## 2023-07-31 DIAGNOSIS — C50.911 PRIMARY BREAST MALIGNANCY, RIGHT (HCC): Primary | ICD-10-CM

## 2023-07-31 PROCEDURE — 3077F SYST BP >= 140 MM HG: CPT | Performed by: SURGERY

## 2023-07-31 PROCEDURE — 3078F DIAST BP <80 MM HG: CPT | Performed by: SURGERY

## 2023-07-31 PROCEDURE — 99205 OFFICE O/P NEW HI 60 MIN: CPT | Performed by: SURGERY

## 2023-07-31 PROCEDURE — 99417 PROLNG OP E/M EACH 15 MIN: CPT | Performed by: SURGERY

## 2023-07-31 RX ORDER — SODIUM CHLORIDE, SODIUM LACTATE, POTASSIUM CHLORIDE, CALCIUM CHLORIDE 600; 310; 30; 20 MG/100ML; MG/100ML; MG/100ML; MG/100ML
INJECTION, SOLUTION INTRAVENOUS CONTINUOUS
OUTPATIENT
Start: 2023-07-31

## 2023-07-31 RX ORDER — SODIUM CHLORIDE 0.9 % (FLUSH) 0.9 %
5-40 SYRINGE (ML) INJECTION EVERY 12 HOURS SCHEDULED
OUTPATIENT
Start: 2023-07-31

## 2023-07-31 RX ORDER — SODIUM CHLORIDE 0.9 % (FLUSH) 0.9 %
5-40 SYRINGE (ML) INJECTION PRN
OUTPATIENT
Start: 2023-07-31

## 2023-07-31 RX ORDER — SODIUM CHLORIDE 9 MG/ML
INJECTION, SOLUTION INTRAVENOUS PRN
OUTPATIENT
Start: 2023-07-31

## 2023-07-31 ASSESSMENT — ENCOUNTER SYMPTOMS
EYES NEGATIVE: 1
GASTROINTESTINAL NEGATIVE: 1
RESPIRATORY NEGATIVE: 1

## 2023-08-04 ENCOUNTER — FOLLOWUP TELEPHONE ENCOUNTER (OUTPATIENT)
Dept: WOMENS IMAGING | Age: 83
End: 2023-08-04

## 2023-08-16 ENCOUNTER — OFFICE VISIT (OUTPATIENT)
Dept: INTERNAL MEDICINE CLINIC | Age: 83
End: 2023-08-16
Payer: MEDICARE

## 2023-08-16 VITALS
HEART RATE: 88 BPM | OXYGEN SATURATION: 98 % | HEIGHT: 61 IN | DIASTOLIC BLOOD PRESSURE: 84 MMHG | SYSTOLIC BLOOD PRESSURE: 132 MMHG | BODY MASS INDEX: 27.34 KG/M2 | WEIGHT: 144.8 LBS

## 2023-08-16 DIAGNOSIS — Z01.818 PREOP EXAMINATION: Primary | ICD-10-CM

## 2023-08-16 DIAGNOSIS — Z01.818 PREOP EXAMINATION: ICD-10-CM

## 2023-08-16 DIAGNOSIS — C50.911 INVASIVE DUCTAL CARCINOMA OF BREAST, STAGE 2, RIGHT (HCC): ICD-10-CM

## 2023-08-16 DIAGNOSIS — R01.1 HEART MURMUR: ICD-10-CM

## 2023-08-16 DIAGNOSIS — I10 ESSENTIAL HYPERTENSION: ICD-10-CM

## 2023-08-16 DIAGNOSIS — E78.00 PURE HYPERCHOLESTEROLEMIA: ICD-10-CM

## 2023-08-16 LAB
ALBUMIN SERPL-MCNC: 4.6 G/DL (ref 3.4–5)
ALBUMIN/GLOB SERPL: 1.9 {RATIO} (ref 1.1–2.2)
ALP SERPL-CCNC: 74 U/L (ref 40–129)
ALT SERPL-CCNC: 13 U/L (ref 10–40)
ANION GAP SERPL CALCULATED.3IONS-SCNC: 13 MMOL/L (ref 3–16)
AST SERPL-CCNC: 17 U/L (ref 15–37)
BASOPHILS # BLD: 0.1 K/UL (ref 0–0.2)
BASOPHILS NFR BLD: 0.9 %
BILIRUB SERPL-MCNC: 0.4 MG/DL (ref 0–1)
BUN SERPL-MCNC: 18 MG/DL (ref 7–20)
CALCIUM SERPL-MCNC: 10 MG/DL (ref 8.3–10.6)
CHLORIDE SERPL-SCNC: 101 MMOL/L (ref 99–110)
CO2 SERPL-SCNC: 29 MMOL/L (ref 21–32)
CREAT SERPL-MCNC: 0.8 MG/DL (ref 0.6–1.2)
DEPRECATED RDW RBC AUTO: 13.3 % (ref 12.4–15.4)
EOSINOPHIL # BLD: 0.2 K/UL (ref 0–0.6)
EOSINOPHIL NFR BLD: 2 %
GFR SERPLBLD CREATININE-BSD FMLA CKD-EPI: >60 ML/MIN/{1.73_M2}
GLUCOSE SERPL-MCNC: 127 MG/DL (ref 70–99)
HCT VFR BLD AUTO: 43.6 % (ref 36–48)
HGB BLD-MCNC: 14.9 G/DL (ref 12–16)
LYMPHOCYTES # BLD: 1.6 K/UL (ref 1–5.1)
LYMPHOCYTES NFR BLD: 19.6 %
MCH RBC QN AUTO: 33.1 PG (ref 26–34)
MCHC RBC AUTO-ENTMCNC: 34.2 G/DL (ref 31–36)
MCV RBC AUTO: 97 FL (ref 80–100)
MONOCYTES # BLD: 0.6 K/UL (ref 0–1.3)
MONOCYTES NFR BLD: 7.9 %
NEUTROPHILS # BLD: 5.6 K/UL (ref 1.7–7.7)
NEUTROPHILS NFR BLD: 69.6 %
PLATELET # BLD AUTO: 298 K/UL (ref 135–450)
PMV BLD AUTO: 8.8 FL (ref 5–10.5)
POTASSIUM SERPL-SCNC: 4.4 MMOL/L (ref 3.5–5.1)
PROT SERPL-MCNC: 7 G/DL (ref 6.4–8.2)
RBC # BLD AUTO: 4.49 M/UL (ref 4–5.2)
SODIUM SERPL-SCNC: 143 MMOL/L (ref 136–145)
WBC # BLD AUTO: 8 K/UL (ref 4–11)

## 2023-08-16 PROCEDURE — 99214 OFFICE O/P EST MOD 30 MIN: CPT

## 2023-08-16 PROCEDURE — 93000 ELECTROCARDIOGRAM COMPLETE: CPT

## 2023-08-16 PROCEDURE — 3074F SYST BP LT 130 MM HG: CPT

## 2023-08-16 PROCEDURE — 3078F DIAST BP <80 MM HG: CPT

## 2023-08-16 PROCEDURE — 1123F ACP DISCUSS/DSCN MKR DOCD: CPT

## 2023-08-16 RX ORDER — PRAVASTATIN SODIUM 20 MG
20 TABLET ORAL DAILY
Qty: 90 TABLET | Refills: 1 | Status: SHIPPED | OUTPATIENT
Start: 2023-08-16

## 2023-08-16 NOTE — ASSESSMENT & PLAN NOTE
Improved since increasing lisinopril to twice daily 4/2023. Blood pressure 132/84 in the office today. Continue lisinopril 20 mg BID, 25 mg hydrochlorothiazide daily.

## 2023-08-16 NOTE — ASSESSMENT & PLAN NOTE
Remains asymptomatic. Had ECHO 6/26/23: mild calcification of the mitral and aortic valves and mild regurgitation of the tricuspid valve which is considered age-related. No stenosis.

## 2023-08-16 NOTE — H&P (VIEW-ONLY)
Mihaela Coley     This patient presents to the office today for a preoperative consultation at the request of surgeon, Marlee Espinosa, who plans on performing Right breat total mastectomy, right sentinel lymph noted biopsy, technetium ninety-nine and injectable blue dye in operating room at [facility] on 9/6/23. Shawna Delgado denies recent illness. She does take 81 mg aspirin daily.      Planned anesthesia: General   Known anesthesia problems: None   Bleeding risk: No recent or remote history of abnormal bleeding  Personal or FH ofDVT/PE: No      Patient Active Problem List   Diagnosis    Pure hypercholesterolemia    Essential hypertension    Encounter for subsequent annual wellness visit (AWV) in Medicare patient    Map-dot-fingerprint corneal dystrophy of right eye    Heart murmur    Impacted cerumen of right ear    Impaired fasting glucose    Abnormal mammogram of right breast    Mass of right breast    Preop examination    Invasive ductal carcinoma of breast, stage 2, right (HCC)     Past Surgical History:   Procedure Laterality Date    EYE SURGERY      TONSILLECTOMY AND ADENOIDECTOMY      US BREAST BIOPSY NEEDLE ADDITIONAL RIGHT Right 7/11/2023    US BREAST BIOPSY NEEDLE ADDITIONAL RIGHT 7/11/2023 [facility]TRASOUND    US BREAST BIOPSY W LOC DEVICE 1ST LESION RIGHT Right 7/11/2023    US BREAST BIOPSY W LOC DEVICE 1ST LESION RIGHT 7/11/2023 [facility] ULTRASOUND       Allergies   Allergen Reactions    Cortisone Rash     Outpatient Medications Marked as Taking for the 8/16/23 encounter (Office Visit) with JAYLEN Thomason CNP   Medication Sig Dispense Refill    hydroCHLOROthiazide (HYDRODIURIL) 25 MG tablet Take 1 tablet by mouth every morning 90 tablet 1    lisinopril (PRINIVIL;ZESTRIL) 20 MG tablet Take 1 tablet by mouth 2 times daily 180 tablet 1    Biotin 5000 MCG TABS Take by mouth daily      MAGNESIUM-ZINC PO Take by mouth 2 times daily      CALCIUM CITRATE PO Take by mouth daily      [DISCONTINUED] examination  Assessment & Plan:  EKG completed in office. Updated CBC, CMP ordered and reviewed. Instructed to stop taking aspirin 1 week prior to surgery. Perioperative risk related to the patients upcoming surgery is considered low. She is cleared for surgery. Pre-op exam completed on 8/16/23 10:20 AM.  Orders:  -     CBC with Auto Differential; Future  -     Comprehensive Metabolic Panel; Future  -     EKG 12 Lead - Clinic Performed  2. Invasive ductal carcinoma of breast, stage 2, right [facility])  Assessment & Plan:  Planned surgical intervention 9/6/23. 3. Heart murmur  Assessment & Plan:  Remains asymptomatic. Had ECHO 6/26/23: mild calcification of the mitral and aortic valves and mild regurgitation of the tricuspid valve which is considered age-related. No stenosis. 4. Essential hypertension  Assessment & Plan:  Improved since increasing lisinopril to twice daily 4/2023. Blood pressure 132/84 in the office today. Continue lisinopril 20 mg BID, 25 mg hydrochlorothiazide daily. 79 y.o. patient approved for Surgery.       Electronically signed by JAYLEN Sood CNP on 8/16/2023 at 3:51 PM.

## 2023-08-16 NOTE — PROGRESS NOTES
Tai Blanco     This patient presents to the office today for a preoperative consultation at the request of surgeon, Corina Burger, who plans on performing Right breat total mastectomy, right sentinel lymph noted biopsy, technetium ninety-nine and injectable blue dye in operating room at [facility] on 9/6/23. Evalyn Claude denies recent illness. She does take 81 mg aspirin daily.      Planned anesthesia: General   Known anesthesia problems: None   Bleeding risk: No recent or remote history of abnormal bleeding  Personal or FH ofDVT/PE: No      Patient Active Problem List   Diagnosis    Pure hypercholesterolemia    Essential hypertension    Encounter for subsequent annual wellness visit (AWV) in Medicare patient    Map-dot-fingerprint corneal dystrophy of right eye    Heart murmur    Impacted cerumen of right ear    Impaired fasting glucose    Abnormal mammogram of right breast    Mass of right breast    Preop examination    Invasive ductal carcinoma of breast, stage 2, right (HCC)     Past Surgical History:   Procedure Laterality Date    EYE SURGERY      TONSILLECTOMY AND ADENOIDECTOMY      US BREAST BIOPSY NEEDLE ADDITIONAL RIGHT Right 7/11/2023    US BREAST BIOPSY NEEDLE ADDITIONAL RIGHT 7/11/2023 [facility] ULTRASOUND    US BREAST BIOPSY W LOC DEVICE 1ST LESION RIGHT Right 7/11/2023    US BREAST BIOPSY W LOC DEVICE 1ST LESION RIGHT 7/11/2023 [facility] ULTRASOUND       Allergies   Allergen Reactions    Cortisone Rash     Outpatient Medications Marked as Taking for the 8/16/23 encounter (Office Visit) with JAYLEN Alvarez - CNP   Medication Sig Dispense Refill    hydroCHLOROthiazide (HYDRODIURIL) 25 MG tablet Take 1 tablet by mouth every morning 90 tablet 1    lisinopril (PRINIVIL;ZESTRIL) 20 MG tablet Take 1 tablet by mouth 2 times daily 180 tablet 1    Biotin 5000 MCG TABS Take by mouth daily      MAGNESIUM-ZINC PO Take by mouth 2 times daily      CALCIUM CITRATE PO Take by mouth daily      [DISCONTINUED]

## 2023-08-24 ENCOUNTER — HOSPITAL ENCOUNTER (OUTPATIENT)
Dept: WOMENS IMAGING | Age: 83
Discharge: HOME | End: 2023-08-24

## 2023-08-24 NOTE — PROGRESS NOTES
[facility]  New Visit Clinic Note      Patient Name: Marlen Caceres             YOB: 1940  MRN: [id number]  Date of Visit: 8/24/2023          Marlen Caceres was referred by Smith Seaman MD  for genetic counseling due to her personal history of invasive breast cancer and family history of breast, prostate and other cancers. Ms. Gwendolyn Nagel was seen in the [facility] at [facility] on  8/24/2023. Hartwell Runner, Licensed Genetic Counselor, spent 40 minutes collecting and reviewing personal and family medical information, providing genetic risk assessment, genetic counseling and psychosocial assessment. Clinical History: Marlen Caceres is a 79 y.o. female with invasive breast cancer. She reports that she is in good general health. She does have high blood pressure, but otherwise, has never had any health history of note. FINAL DIAGNOSIS:        A. Right breast 10 o'clock 7 cm from nipple, biopsy:        - Invasive mammary carcinoma, Massiel grade 2 (See Comment/          Checklist Summary). Biomarkers:           Estrogen receptor: Positive (>90%, strong intensity)           Progesterone receptor: Positive (>90%, moderate/ strong        intensity)           HER2 IHC: Negative (Score 1+)        B. Right breast 10 o'clock 1 cm from nipple, biopsy:        - Focally involved by invasive mammary carcinoma (See Comment). Cancer Surveillance:   Mammogram: has not had in several years   Previous breast biopsies: none prior  Colonoscopy: none   Polyps: N/A  Upper endoscopy: none  ELIAZAR/BSO: none   Only past surgical history tonisls and cataract     Psychosocial concerns: Khloe Torres is interested in genetic testing but has some reservations about the increased screening and surveillance. There is likely some nuance to certain screening recommendations and interventions on the basis of her age as she is turning 79 in October.  Regardless,

## 2023-08-28 NOTE — ASSESSMENT & PLAN NOTE
Perioperative risk related to the patients upcoming surgery is considered low. She is cleared for surgery.  Pre-op exam completed on 8/16/23 10:20 AM.

## 2023-08-31 NOTE — PROGRESS NOTES
Name_______________________________________Printed:____________________  Date and time of surgery_9/6/2023__1145_____________________Arrival Time:__1015_masc_____________   1. The instructions given regarding when and if a patient needs to stop oral intake prior to surgery varies. Follow the specific instructions you were given                  _x__Nothing to eat or to drink after Midnight the night before.                   ____Carbo loading or instructions will be given to select patients-if you have been given those instructions -please do the following                           The evening before your surgery after dinner before midnight drink 40 ounces of gatorade. If you are diabetic use sugar free. The morning of surgery drink 40 ounces of water. This needs to be finished 3 hours prior to your surgery start time. 2. Take the following pills with a small sip of water on the morning of surgery_none__________________________________________________                  Do not take blood pressure medications ending in pril or sartan the elizabeth prior to surgery or the morning of surgery. Dr Bethanie Jacobo patient are not to take any medications the AM of surgery. 3. Aspirin, Ibuprofen, Advil, Naproxen, Vitamin E and other Anti-inflammatory products and supplements should be stopped for 5 -7days before surgery or as directed by your physician. 4. Check with your Doctor regarding stopping Plavix, Coumadin,Eliquis, Lovenox,Effient,Pradaxa,Xarelto, Fragmin or other blood thinners and follow their instructions. 5. Do not smoke, and do not drink any alcoholic beverages 24 hours prior to surgery. This includes NA Beer. Refrain from the usage of any recreational drugs. 6. You may brush your teeth and gargle the morning of surgery. DO NOT SWALLOW WATER   7. You MUST make arrangements for a responsible adult to stay on site while you are here and take you home after your surgery.  You will not be allowed to leave alone or

## 2023-09-05 ENCOUNTER — TELEPHONE (OUTPATIENT)
Dept: SURGERY | Age: 83
End: 2023-09-05

## 2023-09-05 NOTE — TELEPHONE ENCOUNTER
I called patient she was driving I was unable to understand her. She asked me to call her back in 5 min. If she returns my call please send her to me.  Surgery time has moved up to 1030, arrival time for tomorrow (9/6/23) is 0900      Roxanne Urbina

## 2023-09-05 NOTE — TELEPHONE ENCOUNTER
Spoke to SANDY notified her of arrival time of 0900 , surgery will be at [facility], [address]

## 2023-09-06 ENCOUNTER — ANESTHESIA (OUTPATIENT)
Dept: OPERATING ROOM | Age: 83
End: 2023-09-06
Payer: MEDICARE

## 2023-09-06 ENCOUNTER — ANESTHESIA EVENT (OUTPATIENT)
Dept: OPERATING ROOM | Age: 83
End: 2023-09-06
Payer: MEDICARE

## 2023-09-06 ENCOUNTER — APPOINTMENT (OUTPATIENT)
Dept: NUCLEAR MEDICINE | Age: 83
End: 2023-09-06
Payer: MEDICARE

## 2023-09-06 ENCOUNTER — HOSPITAL ENCOUNTER (OUTPATIENT)
Age: 83
Setting detail: OUTPATIENT SURGERY
Discharge: HOME | End: 2023-09-06
Attending: SURGERY | Admitting: SURGERY
Payer: MEDICARE

## 2023-09-06 VITALS
HEART RATE: 85 BPM | BODY MASS INDEX: 26.43 KG/M2 | OXYGEN SATURATION: 94 % | RESPIRATION RATE: 21 BRPM | HEIGHT: 61 IN | DIASTOLIC BLOOD PRESSURE: 55 MMHG | WEIGHT: 140 LBS | SYSTOLIC BLOOD PRESSURE: 126 MMHG | TEMPERATURE: 97.8 F

## 2023-09-06 DIAGNOSIS — C50.911 PRIMARY BREAST MALIGNANCY, RIGHT (HCC): ICD-10-CM

## 2023-09-06 DIAGNOSIS — G89.18 POST-OP PAIN: Primary | ICD-10-CM

## 2023-09-06 LAB
ABO + RH BLD: NORMAL
BLD GP AB SCN SERPL QL: NORMAL

## 2023-09-06 PROCEDURE — 6360000002 HC RX W HCPCS: Performed by: STUDENT IN AN ORGANIZED HEALTH CARE EDUCATION/TRAINING PROGRAM

## 2023-09-06 PROCEDURE — 2580000003 HC RX 258: Performed by: SURGERY

## 2023-09-06 PROCEDURE — 6370000000 HC RX 637 (ALT 250 FOR IP): Performed by: STUDENT IN AN ORGANIZED HEALTH CARE EDUCATION/TRAINING PROGRAM

## 2023-09-06 PROCEDURE — 86900 BLOOD TYPING SEROLOGIC ABO: CPT

## 2023-09-06 PROCEDURE — 7100000000 HC PACU RECOVERY - FIRST 15 MIN: Performed by: SURGERY

## 2023-09-06 PROCEDURE — 86901 BLOOD TYPING SEROLOGIC RH(D): CPT

## 2023-09-06 PROCEDURE — 2500000003 HC RX 250 WO HCPCS: Performed by: SURGERY

## 2023-09-06 PROCEDURE — 88309 TISSUE EXAM BY PATHOLOGIST: CPT

## 2023-09-06 PROCEDURE — 88307 TISSUE EXAM BY PATHOLOGIST: CPT

## 2023-09-06 PROCEDURE — 38792 RA TRACER ID OF SENTINL NODE: CPT | Performed by: SURGERY

## 2023-09-06 PROCEDURE — 3600000014 HC SURGERY LEVEL 4 ADDTL 15MIN: Performed by: SURGERY

## 2023-09-06 PROCEDURE — 86850 RBC ANTIBODY SCREEN: CPT

## 2023-09-06 PROCEDURE — 3700000000 HC ANESTHESIA ATTENDED CARE: Performed by: SURGERY

## 2023-09-06 PROCEDURE — 7100000010 HC PHASE II RECOVERY - FIRST 15 MIN: Performed by: SURGERY

## 2023-09-06 PROCEDURE — 7100000011 HC PHASE II RECOVERY - ADDTL 15 MIN: Performed by: SURGERY

## 2023-09-06 PROCEDURE — 3600000004 HC SURGERY LEVEL 4 BASE: Performed by: SURGERY

## 2023-09-06 PROCEDURE — 2720000010 HC SURG SUPPLY STERILE: Performed by: SURGERY

## 2023-09-06 PROCEDURE — 38900 IO MAP OF SENT LYMPH NODE: CPT | Performed by: SURGERY

## 2023-09-06 PROCEDURE — 3430000000 HC RX DIAGNOSTIC RADIOPHARMACEUTICAL: Performed by: SURGERY

## 2023-09-06 PROCEDURE — C9290 INJ, BUPIVACAINE LIPOSOME: HCPCS | Performed by: SURGERY

## 2023-09-06 PROCEDURE — 3700000001 HC ADD 15 MINUTES (ANESTHESIA): Performed by: SURGERY

## 2023-09-06 PROCEDURE — 88342 IMHCHEM/IMCYTCHM 1ST ANTB: CPT

## 2023-09-06 PROCEDURE — 38792 RA TRACER ID OF SENTINL NODE: CPT

## 2023-09-06 PROCEDURE — 6360000002 HC RX W HCPCS: Performed by: NURSE ANESTHETIST, CERTIFIED REGISTERED

## 2023-09-06 PROCEDURE — 6360000002 HC RX W HCPCS: Performed by: SURGERY

## 2023-09-06 PROCEDURE — 6370000000 HC RX 637 (ALT 250 FOR IP): Performed by: SURGERY

## 2023-09-06 PROCEDURE — 38525 BIOPSY/REMOVAL LYMPH NODES: CPT | Performed by: SURGERY

## 2023-09-06 PROCEDURE — A9520 TC99 TILMANOCEPT DIAG 0.5MCI: HCPCS | Performed by: SURGERY

## 2023-09-06 PROCEDURE — 2709999900 HC NON-CHARGEABLE SUPPLY: Performed by: SURGERY

## 2023-09-06 PROCEDURE — 2500000003 HC RX 250 WO HCPCS: Performed by: NURSE ANESTHETIST, CERTIFIED REGISTERED

## 2023-09-06 PROCEDURE — 19303 MAST SIMPLE COMPLETE: CPT | Performed by: SURGERY

## 2023-09-06 PROCEDURE — 7100000001 HC PACU RECOVERY - ADDTL 15 MIN: Performed by: SURGERY

## 2023-09-06 PROCEDURE — C9399 UNCLASSIFIED DRUGS OR BIOLOG: HCPCS | Performed by: NURSE ANESTHETIST, CERTIFIED REGISTERED

## 2023-09-06 RX ORDER — FENTANYL CITRATE 50 UG/ML
25 INJECTION, SOLUTION INTRAMUSCULAR; INTRAVENOUS EVERY 5 MIN PRN
Status: DISCONTINUED | OUTPATIENT
Start: 2023-09-06 | End: 2023-09-06 | Stop reason: HOSPADM

## 2023-09-06 RX ORDER — ACETAMINOPHEN 325 MG/1
650 TABLET ORAL ONCE
Status: COMPLETED | OUTPATIENT
Start: 2023-09-06 | End: 2023-09-06

## 2023-09-06 RX ORDER — BUPIVACAINE HYDROCHLORIDE 2.5 MG/ML
INJECTION, SOLUTION EPIDURAL; INFILTRATION; INTRACAUDAL
Status: COMPLETED | OUTPATIENT
Start: 2023-09-06 | End: 2023-09-06

## 2023-09-06 RX ORDER — SODIUM CHLORIDE, SODIUM LACTATE, POTASSIUM CHLORIDE, CALCIUM CHLORIDE 600; 310; 30; 20 MG/100ML; MG/100ML; MG/100ML; MG/100ML
INJECTION, SOLUTION INTRAVENOUS CONTINUOUS
Status: DISCONTINUED | OUTPATIENT
Start: 2023-09-06 | End: 2023-09-06 | Stop reason: HOSPADM

## 2023-09-06 RX ORDER — SODIUM CHLORIDE 0.9 % (FLUSH) 0.9 %
5-40 SYRINGE (ML) INJECTION EVERY 12 HOURS SCHEDULED
Status: DISCONTINUED | OUTPATIENT
Start: 2023-09-06 | End: 2023-09-06 | Stop reason: HOSPADM

## 2023-09-06 RX ORDER — FENTANYL CITRATE 50 UG/ML
INJECTION, SOLUTION INTRAMUSCULAR; INTRAVENOUS PRN
Status: DISCONTINUED | OUTPATIENT
Start: 2023-09-06 | End: 2023-09-06 | Stop reason: SDUPTHER

## 2023-09-06 RX ORDER — OXYCODONE HYDROCHLORIDE 5 MG/1
5 TABLET ORAL
Status: COMPLETED | OUTPATIENT
Start: 2023-09-06 | End: 2023-09-06

## 2023-09-06 RX ORDER — ONDANSETRON 2 MG/ML
4 INJECTION INTRAMUSCULAR; INTRAVENOUS
Status: DISCONTINUED | OUTPATIENT
Start: 2023-09-06 | End: 2023-09-06 | Stop reason: HOSPADM

## 2023-09-06 RX ORDER — SODIUM CHLORIDE 9 MG/ML
INJECTION, SOLUTION INTRAVENOUS PRN
Status: DISCONTINUED | OUTPATIENT
Start: 2023-09-06 | End: 2023-09-06 | Stop reason: HOSPADM

## 2023-09-06 RX ORDER — MIDAZOLAM HYDROCHLORIDE 1 MG/ML
INJECTION INTRAMUSCULAR; INTRAVENOUS PRN
Status: DISCONTINUED | OUTPATIENT
Start: 2023-09-06 | End: 2023-09-06 | Stop reason: SDUPTHER

## 2023-09-06 RX ORDER — HYDROCODONE BITARTRATE AND ACETAMINOPHEN 5; 325 MG/1; MG/1
1 TABLET ORAL EVERY 4 HOURS PRN
Qty: 42 TABLET | Refills: 0 | Status: SHIPPED | OUTPATIENT
Start: 2023-09-06 | End: 2023-09-13

## 2023-09-06 RX ORDER — SUCCINYLCHOLINE/SOD CL,ISO/PF 200MG/10ML
SYRINGE (ML) INTRAVENOUS PRN
Status: DISCONTINUED | OUTPATIENT
Start: 2023-09-06 | End: 2023-09-06 | Stop reason: SDUPTHER

## 2023-09-06 RX ORDER — SODIUM CHLORIDE 0.9 % (FLUSH) 0.9 %
5-40 SYRINGE (ML) INJECTION PRN
Status: DISCONTINUED | OUTPATIENT
Start: 2023-09-06 | End: 2023-09-06 | Stop reason: HOSPADM

## 2023-09-06 RX ORDER — ONDANSETRON 2 MG/ML
INJECTION INTRAMUSCULAR; INTRAVENOUS PRN
Status: DISCONTINUED | OUTPATIENT
Start: 2023-09-06 | End: 2023-09-06 | Stop reason: SDUPTHER

## 2023-09-06 RX ORDER — ISOSULFAN BLUE 50 MG/5ML
INJECTION, SOLUTION SUBCUTANEOUS
Status: COMPLETED | OUTPATIENT
Start: 2023-09-06 | End: 2023-09-06

## 2023-09-06 RX ORDER — ROCURONIUM BROMIDE 10 MG/ML
INJECTION, SOLUTION INTRAVENOUS PRN
Status: DISCONTINUED | OUTPATIENT
Start: 2023-09-06 | End: 2023-09-06 | Stop reason: SDUPTHER

## 2023-09-06 RX ORDER — PHENYLEPHRINE HCL IN 0.9% NACL 1 MG/10 ML
SYRINGE (ML) INTRAVENOUS PRN
Status: DISCONTINUED | OUTPATIENT
Start: 2023-09-06 | End: 2023-09-06 | Stop reason: SDUPTHER

## 2023-09-06 RX ORDER — LIDOCAINE HYDROCHLORIDE 20 MG/ML
INJECTION, SOLUTION INFILTRATION; PERINEURAL PRN
Status: DISCONTINUED | OUTPATIENT
Start: 2023-09-06 | End: 2023-09-06 | Stop reason: SDUPTHER

## 2023-09-06 RX ORDER — PROPOFOL 10 MG/ML
INJECTION, EMULSION INTRAVENOUS PRN
Status: DISCONTINUED | OUTPATIENT
Start: 2023-09-06 | End: 2023-09-06 | Stop reason: SDUPTHER

## 2023-09-06 RX ORDER — HYDROMORPHONE HYDROCHLORIDE 2 MG/ML
0.2 INJECTION, SOLUTION INTRAMUSCULAR; INTRAVENOUS; SUBCUTANEOUS EVERY 5 MIN PRN
Status: COMPLETED | OUTPATIENT
Start: 2023-09-06 | End: 2023-09-06

## 2023-09-06 RX ORDER — FIBRINOGEN HUMAN AND THROMBIN HUMAN 90; 500 [IU]/ML; [IU]/ML
SOLUTION TOPICAL
Status: COMPLETED | OUTPATIENT
Start: 2023-09-06 | End: 2023-09-06

## 2023-09-06 RX ORDER — APREPITANT 40 MG/1
40 CAPSULE ORAL ONCE
Status: COMPLETED | OUTPATIENT
Start: 2023-09-06 | End: 2023-09-06

## 2023-09-06 RX ADMIN — PROPOFOL 100 MG: 10 INJECTION, EMULSION INTRAVENOUS at 11:44

## 2023-09-06 RX ADMIN — Medication 200 MCG: at 12:03

## 2023-09-06 RX ADMIN — Medication 100 MCG: at 12:31

## 2023-09-06 RX ADMIN — HYDROMORPHONE HYDROCHLORIDE 0.2 MG: 2 INJECTION, SOLUTION INTRAMUSCULAR; INTRAVENOUS; SUBCUTANEOUS at 14:39

## 2023-09-06 RX ADMIN — OXYCODONE HYDROCHLORIDE 5 MG: 5 TABLET ORAL at 14:59

## 2023-09-06 RX ADMIN — HYDROMORPHONE HYDROCHLORIDE 0.2 MG: 2 INJECTION, SOLUTION INTRAMUSCULAR; INTRAVENOUS; SUBCUTANEOUS at 14:50

## 2023-09-06 RX ADMIN — ONDANSETRON 4 MG: 2 INJECTION INTRAMUSCULAR; INTRAVENOUS at 13:58

## 2023-09-06 RX ADMIN — ROCURONIUM BROMIDE 10 MG: 10 INJECTION INTRAVENOUS at 11:44

## 2023-09-06 RX ADMIN — MIDAZOLAM 1 MG: 1 INJECTION INTRAMUSCULAR; INTRAVENOUS at 11:39

## 2023-09-06 RX ADMIN — Medication 100 MG: at 11:44

## 2023-09-06 RX ADMIN — FENTANYL CITRATE 25 MCG: 50 INJECTION, SOLUTION INTRAMUSCULAR; INTRAVENOUS at 13:58

## 2023-09-06 RX ADMIN — TILMANOCEPT 545 MICRO CURIE: KIT at 12:14

## 2023-09-06 RX ADMIN — FENTANYL CITRATE 25 MCG: 50 INJECTION, SOLUTION INTRAMUSCULAR; INTRAVENOUS at 12:12

## 2023-09-06 RX ADMIN — CEFAZOLIN 2000 MG: 2 INJECTION, POWDER, FOR SOLUTION INTRAMUSCULAR; INTRAVENOUS at 11:34

## 2023-09-06 RX ADMIN — ROCURONIUM BROMIDE 20 MG: 10 INJECTION INTRAVENOUS at 11:54

## 2023-09-06 RX ADMIN — SUGAMMADEX 100 MG: 100 INJECTION, SOLUTION INTRAVENOUS at 13:52

## 2023-09-06 RX ADMIN — ACETAMINOPHEN 650 MG: 325 TABLET ORAL at 10:25

## 2023-09-06 RX ADMIN — Medication 100 MCG: at 12:51

## 2023-09-06 RX ADMIN — APREPITANT 40 MG: 40 CAPSULE ORAL at 10:27

## 2023-09-06 RX ADMIN — LIDOCAINE HYDROCHLORIDE 60 MG: 20 INJECTION, SOLUTION INFILTRATION; PERINEURAL at 11:44

## 2023-09-06 RX ADMIN — FENTANYL CITRATE 50 MCG: 50 INJECTION, SOLUTION INTRAMUSCULAR; INTRAVENOUS at 11:43

## 2023-09-06 RX ADMIN — SODIUM CHLORIDE, POTASSIUM CHLORIDE, SODIUM LACTATE AND CALCIUM CHLORIDE: 600; 310; 30; 20 INJECTION, SOLUTION INTRAVENOUS at 13:02

## 2023-09-06 RX ADMIN — SODIUM CHLORIDE, POTASSIUM CHLORIDE, SODIUM LACTATE AND CALCIUM CHLORIDE: 600; 310; 30; 20 INJECTION, SOLUTION INTRAVENOUS at 10:28

## 2023-09-06 RX ADMIN — Medication 50 MCG: at 11:55

## 2023-09-06 RX ADMIN — FENTANYL CITRATE 25 MCG: 50 INJECTION, SOLUTION INTRAMUSCULAR; INTRAVENOUS at 12:43

## 2023-09-06 RX ADMIN — Medication 150 MCG: at 11:58

## 2023-09-06 ASSESSMENT — PAIN SCALES - GENERAL
PAINLEVEL_OUTOF10: 3
PAINLEVEL_OUTOF10: 5
PAINLEVEL_OUTOF10: 5
PAINLEVEL_OUTOF10: 0
PAINLEVEL_OUTOF10: 8

## 2023-09-06 ASSESSMENT — PAIN DESCRIPTION - ORIENTATION
ORIENTATION: RIGHT

## 2023-09-06 ASSESSMENT — PAIN DESCRIPTION - LOCATION
LOCATION: BREAST

## 2023-09-06 ASSESSMENT — PAIN DESCRIPTION - PAIN TYPE
TYPE: SURGICAL PAIN
TYPE: SURGICAL PAIN

## 2023-09-06 ASSESSMENT — PAIN - FUNCTIONAL ASSESSMENT
PAIN_FUNCTIONAL_ASSESSMENT: 0-10
PAIN_FUNCTIONAL_ASSESSMENT: ACTIVITIES ARE NOT PREVENTED

## 2023-09-06 ASSESSMENT — PAIN DESCRIPTION - DESCRIPTORS
DESCRIPTORS: SORE

## 2023-09-06 NOTE — OP NOTE
Operative Note      Postoperative Note    Lizabeth Thompson  YOB: 1940  [id number]    Pre-operative Diagnosis: T 3 N0 right breast cancer  Primary breast malignancy, right (HCC) [C50.911]    Post-operative Diagnosis: Same    Procedure: Injection of isosulfuran blue dye for lymphatic mapping, injection of radiopharmaceutical for lymphatic mapping, lymphatic mapping with neoprobe unit,  right deep axillary selective lymph node dissection,right total mastectomy    Anesthesia: General    Surgeons/Assistants: Winifred Ndiaye  Assistant: Alexis Arshad    Estimated Blood Loss: 50    Drains:  1 x 15F round under mastectomy flaps    Complications: None apparent at conclusion of procedure    Specimens: right breast tissue, sentinel nodes (see below for details)    Findings: sentinel nodes, see below    Post-Op Condition: Stable    Disposition: to recovery room  Description of Procedure:   Ms. China Henson is a 79 y.o. woman with a clinical T3 N0 right breast cancer. She has elected to proceed with right total mastectomy  and selective lymph node dissection. She will not be undergoing immediate chest wall reconstruction at this time. The indications for the planned procedure, along with the potential benefits and risks which include but are not limited to the risk of anesthesia, bleeding, infection, possible failed operation, possible need for additional surgery pending final pathologic assessment, lymphedema, sensation changes, and unappealing cosmetics were reviewed. All questions were answered and she agrees to proceed. Ms. China Henson was met by me in the preoperative area. The surgical site was identified. The patient's surgical site was marked. Consent was obtained. The appropriate breast imaging was reviewed. She was brought to the operating room and placed supine with her arms extended on boards. She was appropriately positioned and padded. Compression stockings were placed.   Appropriate were removed: Yes  Biopsy-proven positive nodes marked with clips prior to chemotherapy were identified and removed: Not Applicable      She was awakened from anesthesia and placed in a surgical binder. She was taken to the recovery room in stable condition and her family was made aware of intraoperative findings. All sentinel nodes and breast specimens were sent to pathology for review. Instrument, sponge, and needle counts were correct.       Electronically signed by Shola Casas MD on 9/6/23 at 11:13 AM EDT

## 2023-09-06 NOTE — DISCHARGE INSTRUCTIONS
Postoperative Instructions for Breast Surgery  Trinidad Hamilton MD [address] [place] [address], [address] [facility] Antonia Shah, [address]  ([phone])    These are general guidelines to help assist in recovery after breast surgery. Please keep in mind all patients recover differently, so please call the office with any questions ([phone]). General guidelines   Rest when you feel tired  You will have a surgical bra on when you wake up. Please wear this day and night until your postoperative appointment. It can be removed for laundering and for showers. This helps immobilize the breast to alleviate discomfort as well as maintain pressure to avoid postoperative seroma. If you had a sentinel lymph node procedure, it is common to have an interval of blue urine and/or stool and blue skin changes of the breast.   Final pathology will take 3-5 days to result. The breast surgery office will call you with the results when they are known. Pain management  You may feel mild to moderate discomfort when anesthesia wears off  You will be given a prescription for a narcotic pain medication  Some patients experience very little discomfort and they prefer not to use the narcotic  You may take Tylenol or extra strength Tylenol instead of the narcotic  Many narcotics also contained Tylenol so you should not take both  AVOID aspirin, fish oil, Excedrin, Motrin, ibuprofen, and other NSAIDS immediately after surgery for at least 48-72 hours. Anticoagulation such as warfarin can typically resume 48 hours after surgery. This should be discussed with your surgeon and prescribing physician. Narcotic medication may cause constipation. Make sure to keep well hydrated, ambulate, and you may eat high-fiber foods to help avoid constipation. You may use over-the-counter medications for constipation. You should not consume alcohol while taking narcotics  You should not drive while taking narcotics  Pain should improve, not worsen as days pass.  If

## 2023-09-06 NOTE — PROGRESS NOTES
Discharge instructions reviewed with patient daughter Doris Stewart  All home medications have been reviewed, questions answered and patient verbalized understanding. Discharge instructions signed and copies given. Patient discharged per w/c with belongings.

## 2023-09-06 NOTE — PROGRESS NOTES
Teaching/ education completed for home care including pain management, wound care,activity,safety precautions,incentive spirometer and infection control. Patient and daughter verbalized understanding.

## 2023-09-06 NOTE — PROGRESS NOTES
Phase 2 - Awake and pleasant , consuming po intake  ( jello ) tolerating well, pain pill given for moderate surgical pain ,daughter at bedside.vss. 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 female with ESRD 2/2 HTN and T1DM who is now s/p simultaneous DBD kidney and pancreas transplant.  There were no intra-operative complications and the procedure went well. The reported EBL was 20cc. She arrives to the ICU extubated and hypertensive on no vasopressor support.     She was previously receiving HD on a TTS via LUE AV fistula. Her reported Dry weight was 61 kg.     Follow-up For: Procedure(s) (LRB):  TRANSPLANT, KIDNEY (Right)  TRANSPLANT, PANCREAS (N/A)    Post-Operative Day: 1 Day Post-Op     Past Medical History:   Diagnosis Date    Acute kidney injury superimposed on chronic kidney disease 4/7/2021    Anemia     Chronic hypertension with exacerbation during pregnancy in second trimester 11/6/2020    Current regimen (11/6/20):  - Carvedilol 12.5 mg BID - Nifedipine 30 mg daily Baseline CKD + proteinuria    Chronic kidney disease     Depression     Diabetes mellitus     Diabetic retinopathy     Diarrhea     Encounter for blood transfusion     Gastroparesis     Glaucoma     Hepatomegaly 4/29/2021    Hx of psychiatric care     Hyperlipidemia     Hypertension     Nephrotic syndrome     Nephrotic syndrome 3/4/2021    Palpitations     Poor fetal growth affecting management of mother in second trimester 10/15/2020    Restrictive lung disease     Retinal detachment     Severe pre-eclampsia in second trimester 11/6/2020       Past Surgical History:   Procedure Laterality Date    AV FISTULA PLACEMENT Left 4/7/2021    Procedure: CREATION, AV FISTULA;  Surgeon: Roberto Ryan MD;  Location:  [facility] OR 2ND FLR;  Service: Peripheral Vascular;  Laterality: Left;    COLONOSCOPY N/A 3/16/2022    Procedure: COLONOSCOPY;  Surgeon: Tavo Kwok MD;  Location: [facility] ENDO (4TH FLR);  Service: Endoscopy;  Laterality: N/A;  Questionable history of delayed gastric emptying longstanding diabetes now on eating pre transplant workup for history of nausea vomiting which seems to have improved with dialysis also chronic diarrhea and history of anemia pre transplant workup for kidney transplant. 3    ESOPHAGOGASTRODUODENOSCOPY N/A 3/16/2022    Procedure: EGD (ESOPHAGOGASTRODUODENOSCOPY);  Surgeon: Tavo Kwok MD;  Location: [facility] ENDO (4TH FLR);  Service: Endoscopy;  Laterality: N/A;  Questionable history of delayed gastric emptying longstanding diabetes now on eating pre transplant workup for history of nausea vomiting which seems to have improved with dialysis also chronic diarrhea and history of anemia pre transplant workup for    FISTULOGRAM N/A 8/11/2021    Procedure: Fistulogram;  Surgeon: Roberto Ryan MD;  Location: [facility] CATH LAB;  Service: Cardiology;  Laterality: N/A;    LASER PHOTOCOAGULATION OF RETINA Right 5/31/2022    Procedure: PHOTOCOAGULATION, RETINA, USING LASER;  Surgeon: Maximilian Montaño MD;  [address];  Service: Ophthalmology;  Laterality: Right;    PERCUTANEOUS TRANSLUMINAL ANGIOPLASTY OF ARTERIOVENOUS FISTULA N/A 8/11/2021    Procedure: PTA, AV FISTULA;  Surgeon: Roberto Ryan MD;  Location: [facility] CATH LAB;  Service: Cardiology;  Laterality: N/A;    REMOVAL IMPLANT, POSTERIOR SEGMENT, INTRAOCULAR Right 2/1/2022    Procedure: REMOVAL IMPLANT, POSTERIOR SEGMENT, INTRAOCULAR;  Surgeon: Maximilian Montaño MD;  [facility];  Service: Ophthalmology;  Laterality: Right;    REPAIR OF RETINAL DETACHMENT WITH VITRECTOMY Right 1/25/2022    Procedure: REPAIR, RETINAL DETACHMENT, WITH VITRECTOMY, MEMBRANE PEEL, LASER, INJECTION OF GAS VS OIL;  Surgeon:  Maximilian Montaño MD;  Location: [facility] OR 1ST FLR;  Service: Ophthalmology;  Laterality: Right;    REVISION OF ARTERIOVENOUS FISTULA Left 12/10/2021    Procedure: REVISION, AV FISTULA with BVT;  Surgeon: Roberto Ryan MD;  Location: [facility] OR 2ND FLR;  Service: Peripheral Vascular;  Laterality: Left;    VITRECTOMY BY PARS PLANA APPROACH Right 2/1/2022    Procedure: VITRECTOMY, PARS PLANA APPROACH;  Surgeon: Maximilian Montaño MD;  Location: [facility] OR 1ST FLR;  Service: Ophthalmology;  Laterality: Right;    VITRECTOMY BY PARS PLANA APPROACH Right 5/31/2022    Procedure: VITRECTOMY, PARS PLANA APPROACH;  Surgeon: Maximilian Montaño MD;  Location: [facility] OR 1ST FLR;  Service: Ophthalmology;  Laterality: Right;       Review of patient's allergies indicates:  No Known Allergies    Family History       Problem Relation (Age of Onset)    Diabetes Brother    Heart disease Father    Hypertension Mother          Tobacco Use    Smoking status: Never    Smokeless tobacco: Never   Substance and Sexual Activity    Alcohol use: No    Drug use: No    Sexual activity: Not Currently     Partners: Male     Comment: monogamous      Review of Systems   Unable to perform ROS: Other (Immediately post op, nodding to questioning)   Objective:     Vital Signs (Most Recent):  Temp: 99.5 °F (37.5 °C) (11/03/22 0500)  Pulse: 99 (11/03/22 0515)  Resp: 16 (11/03/22 0515)  BP: 137/89 (11/02/22 1936)  SpO2: 100 % (11/03/22 0515) Vital Signs (24h Range):  Temp:  [97.9 °F (36.6 °C)-99.5 °F (37.5 °C)] 99.5 °F (37.5 °C)  Pulse:  [] 99  Resp:  [16-20] 16  SpO2:  [95 %-100 %] 100 %  BP: (128-179)/() 137/89  Arterial Line BP: (164-181)/(73-85) 164/73     Weight: 69.6 kg (153 lb 7 oz)  Body mass index is 26.34 kg/m².      Intake/Output Summary (Last 24 hours) at 11/3/2022 0548  Last data filed at 11/3/2022 0500  Gross per 24 hour   Intake 3587.26 ml   Output 3477 ml   Net 110.26 ml       Physical Exam  HENT:      Head: Normocephalic and  atraumatic.   Cardiovascular:      Rate and Rhythm: Normal rate and regular rhythm.   Pulmonary:      Effort: Pulmonary effort is normal. No respiratory distress.   Abdominal:      General: There is no distension.      Palpations: Abdomen is soft.      Comments: Midline incision C/D/I. Single R abdominal SINAN drain with thin SS output    Genitourinary:     Comments: Dc in place, pink tinged  Skin:     General: Skin is warm and dry.   Neurological:      General: No focal deficit present.      Mental Status: She is alert.       Vents:       Lines/Drains/Airways       Drain  Duration                  Hemodialysis AV Fistula Left upper arm -- days         Closed/Suction Drain 11/03/22 0407 Right LUQ Bulb 19 Fr. <1 day         Urethral Catheter 11/02/22 2140 Triple-lumen;Non-latex;Straight-tip;Silicone 20 Fr. <1 day              Arterial Line  Duration             Arterial Line 11/02/22 2200 Right Radial <1 day              Peripheral Intravenous Line  Duration                  Peripheral IV - Single Lumen 11/02/22 0035 22 G Anterior;Right Forearm 1 day                    Significant Labs:    CBC/Anemia Profile:  Recent Labs   Lab 11/02/22 0038   WBC 6.29   HGB 9.9*   HCT 30.0*      MCV 99*   RDW 14.3        Chemistries:  Recent Labs   Lab 11/02/22 0038 11/02/22  1419   *  --    K 4.8 5.5*   CL 93*  --    CO2 26  --    BUN 40*  --    CREATININE 6.5*  --    CALCIUM 9.2  --    ALBUMIN 4.0  --    PROT 8.5*  --    BILITOT 0.7  --    ALKPHOS 73  --    ALT 16  --    AST 19  --    MG 2.3  --    PHOS 4.8*  --        All pertinent labs within the past 24 hours have been reviewed.    Significant Imaging: I have reviewed all pertinent imaging results/findings within the past 24 hours.    Assessment/Plan:       Neuro/Psych:   -- Pain: Tylenol, PCA             Cards:   -- HDS  -- Continue home Carvedilol, hydralazine, nifedipine plus PRNs      Pulm:   -- Goal O2 sat > 90%  -- Wean as able      Renal:  -- Keep dc  for strict I/O  -- Trend BUN/Cr   -- BMP Q6H for 1 day, then daily thereafter      FEN / GI:   -- Replace lytes as needed  -- Nutrition: NPO  -- Anti-rejection meds: Thymoglobulin, diphenhydramine, methylpred, mycophenalte, tacro; Daily tacro levels  -- US Panc/Kidney on 11/4  -- Amylase, Lipase Q6H for 1 day then daily there after        ID:   -- Tm: afebrile  -- Abx Zaria-op ampicillin, fluc; Bactrim, valganciclovir, nystatin suspension       Heme/Onc:   -- H/H stable   -- CBC Q6H for 1 day then daily thereafter      Endo:   -- Gluc goal 140-180  -- Insulin gtt       PPx:   Feeding: NPO  Analgesia/Sedation: Tylenol plus PCA / not indicated  Thromboembolic prevention: SQH  HOB >30: Not indicated  Stress Ulcer ppx: Famotidine   Glucose control: Critical care goal 140-180 g/dl, ISS    Lines/Drains/Airway: A line, SINAN drain x1      Dispo/Code Status/Palliative:   -- SICU / Full Code       Domo Rosenthal MD  Critical Care - Surgery  [facility] - Surgical Intensive Care

## 2023-09-06 NOTE — PROGRESS NOTES
Received from OR - Awakening ,simple mask @ 5 liters 99%, surgi bra dressing right breast  gauze dry and intact,,jada drain compressed with scant red drainage in tube, scd,vss.

## 2023-09-06 NOTE — INTERVAL H&P NOTE
H&P Update    The patient's most recent H&P, office notes, breast imaging, and pathology were reviewed. Patient examined and laterality marked. There has been no changes. We will plan to proceed with a right mastectomy with sentinel lymph node biopsy.     Dylan Auguste MD

## 2023-09-06 NOTE — PROGRESS NOTES
Teaching / education initiated regarding perioperative experience, expectations, and pain management during stay. Patient verbalized understanding. Also reviewed care of DEBORAH drain (emptying.  Measuring, milking) with pt, daughter and granddaughter

## 2023-09-06 NOTE — ANESTHESIA POSTPROCEDURE EVALUATION
Department of Anesthesiology  Postprocedure Note    Patient: Robin Phoenix  MRN: [id number]  YOB: 1940  Date of evaluation: 9/6/2023      Procedure Summary     Date: 09/06/23 Room / Location: [address]    Anesthesia Start: 9441 Anesthesia Stop: 4152    Procedure: RIGHT BREAST TOTAL MASTECTOMY, RIGHT SENTINEL LYMPH NODE BIOPSY, TECHNETIUM NINETY-NINE AND INJECTABLE BLUE DYE IN OPERATING ROOM (Right: Breast) Diagnosis:       Primary breast malignancy, right ([address])      (Primary breast malignancy, right ([address]) Caridad Cooper)    Surgeons: Parker Lozada MD Responsible Provider: Shahida Lancaster MD    Anesthesia Type: general ASA Status: 2          Anesthesia Type: No value filed.     Aguila Phase I: Aguila Score: 10    Aguila Phase II: Aguila Score: 10      Anesthesia Post Evaluation    Patient location during evaluation: PACU  Patient participation: complete - patient participated  Level of consciousness: awake and alert  Pain score: 0  Airway patency: patent  Nausea & Vomiting: no nausea  Complications: no  Cardiovascular status: blood pressure returned to baseline  Respiratory status: acceptable  Hydration status: euvolemic  Multimodal analgesia pain management approach  Pain management: adequate

## 2023-09-07 ENCOUNTER — TELEMEDICINE (OUTPATIENT)
Dept: SURGERY | Age: 83
End: 2023-09-07

## 2023-09-07 DIAGNOSIS — C50.911 PRIMARY BREAST MALIGNANCY, RIGHT (HCC): ICD-10-CM

## 2023-09-07 DIAGNOSIS — Z09 POSTOP CHECK: Primary | ICD-10-CM

## 2023-09-07 PROCEDURE — 99024 POSTOP FOLLOW-UP VISIT: CPT | Performed by: SURGERY

## 2023-09-14 ENCOUNTER — HOSPITAL ENCOUNTER (INPATIENT)
Age: 83
LOS: 5 days | Discharge: HOME HEALTH | DRG: 163 | End: 2023-09-19
Attending: EMERGENCY MEDICINE | Admitting: INTERNAL MEDICINE
Payer: MEDICARE

## 2023-09-14 ENCOUNTER — APPOINTMENT (OUTPATIENT)
Dept: GENERAL RADIOLOGY | Age: 83
DRG: 163 | End: 2023-09-14
Payer: MEDICARE

## 2023-09-14 ENCOUNTER — APPOINTMENT (OUTPATIENT)
Dept: CT IMAGING | Age: 83
DRG: 163 | End: 2023-09-14
Payer: MEDICARE

## 2023-09-14 ENCOUNTER — TELEPHONE (OUTPATIENT)
Dept: SURGERY | Age: 83
End: 2023-09-14

## 2023-09-14 DIAGNOSIS — J96.01 ACUTE RESPIRATORY FAILURE WITH HYPOXIA (HCC): ICD-10-CM

## 2023-09-14 DIAGNOSIS — Z90.11 S/P MASTECTOMY, RIGHT: ICD-10-CM

## 2023-09-14 DIAGNOSIS — I46.9 CARDIOPULMONARY ARREST WITH SUCCESSFUL RESUSCITATION (HCC): Primary | ICD-10-CM

## 2023-09-14 DIAGNOSIS — I26.09 OTHER ACUTE PULMONARY EMBOLISM WITH ACUTE COR PULMONALE (HCC): ICD-10-CM

## 2023-09-14 PROBLEM — R57.9 SHOCK CIRCULATORY (HCC): Status: ACTIVE | Noted: 2023-09-14

## 2023-09-14 PROBLEM — J98.9 CARDIAC ARREST DUE TO RESPIRATORY DISORDER (HCC): Status: ACTIVE | Noted: 2023-09-14

## 2023-09-14 PROBLEM — I26.99 BILATERAL PULMONARY EMBOLISM (HCC): Status: ACTIVE | Noted: 2023-09-14

## 2023-09-14 PROBLEM — I46.8 CARDIAC ARREST DUE TO RESPIRATORY DISORDER (HCC): Status: ACTIVE | Noted: 2023-09-14

## 2023-09-14 LAB
ABO + RH BLD: NORMAL
ALBUMIN SERPL-MCNC: 3.2 G/DL (ref 3.4–5)
ALBUMIN/GLOB SERPL: 1.2 {RATIO} (ref 1.1–2.2)
ALP SERPL-CCNC: 94 U/L (ref 40–129)
ALT SERPL-CCNC: 117 U/L (ref 10–40)
ANION GAP SERPL CALCULATED.3IONS-SCNC: 20 MMOL/L (ref 3–16)
ANTI-XA UNFRAC HEPARIN: >1.1 IU/ML (ref 0.3–0.7)
APTT BLD: 27.3 SEC (ref 22.7–35.9)
AST SERPL-CCNC: 160 U/L (ref 15–37)
BASE EXCESS BLDA CALC-SCNC: -1 MMOL/L (ref -3–3)
BASE EXCESS BLDA CALC-SCNC: -8.2 MMOL/L (ref -3–3)
BASOPHILS # BLD: 0 K/UL (ref 0–0.2)
BASOPHILS NFR BLD: 0 %
BILIRUB SERPL-MCNC: 0.5 MG/DL (ref 0–1)
BLD GP AB SCN SERPL QL: NORMAL
BUN SERPL-MCNC: 24 MG/DL (ref 7–20)
CALCIUM SERPL-MCNC: 8.6 MG/DL (ref 8.3–10.6)
CHLORIDE SERPL-SCNC: 102 MMOL/L (ref 99–110)
CO2 BLDA-SCNC: 24 MMOL/L
CO2 BLDA-SCNC: 51.6 MMOL/L
CO2 SERPL-SCNC: 14 MMOL/L (ref 21–32)
COHGB MFR BLDA: 1.3 % (ref 0–1.5)
CREAT SERPL-MCNC: 1.1 MG/DL (ref 0.6–1.2)
DEPRECATED RDW RBC AUTO: 13.8 % (ref 12.4–15.4)
EKG ATRIAL RATE: 139 BPM
EKG DIAGNOSIS: NORMAL
EKG P AXIS: 65 DEGREES
EKG P-R INTERVAL: 140 MS
EKG Q-T INTERVAL: 276 MS
EKG QRS DURATION: 86 MS
EKG QTC CALCULATION (BAZETT): 419 MS
EKG R AXIS: 86 DEGREES
EKG T AXIS: 41 DEGREES
EKG VENTRICULAR RATE: 139 BPM
EOSINOPHIL # BLD: 0 K/UL (ref 0–0.6)
EOSINOPHIL NFR BLD: 0 %
GFR SERPLBLD CREATININE-BSD FMLA CKD-EPI: 50 ML/MIN/{1.73_M2}
GLUCOSE BLD-MCNC: 226 MG/DL (ref 70–99)
GLUCOSE SERPL-MCNC: 298 MG/DL (ref 70–99)
HCO3 BLDA-SCNC: 21.2 MMOL/L (ref 21–29)
HCO3 BLDA-SCNC: 23 MMOL/L (ref 21–29)
HCT VFR BLD AUTO: 42.3 % (ref 36–48)
HGB BLD-MCNC: 13.6 G/DL (ref 12–16)
HGB BLDA-MCNC: 13.4 G/DL (ref 12–16)
INR PPP: 1.27 (ref 0.84–1.16)
LACTATE BLDV-SCNC: 2.2 MMOL/L (ref 0.4–2)
LACTATE BLDV-SCNC: 4 MMOL/L (ref 0.4–1.9)
LACTATE BLDV-SCNC: 8.6 MMOL/L (ref 0.4–1.9)
LYMPHOCYTES # BLD: 5.9 K/UL (ref 1–5.1)
LYMPHOCYTES NFR BLD: 36 %
MACROCYTES BLD QL SMEAR: ABNORMAL
MCH RBC QN AUTO: 32.3 PG (ref 26–34)
MCHC RBC AUTO-ENTMCNC: 32.1 G/DL (ref 31–36)
MCV RBC AUTO: 100.9 FL (ref 80–100)
METHGB MFR BLDA: 0.2 %
MONOCYTES # BLD: 0.9 K/UL (ref 0–1.3)
MONOCYTES NFR BLD: 6 %
NEUTROPHILS # BLD: 7.6 K/UL (ref 1.7–7.7)
NEUTROPHILS NFR BLD: 52 %
NEUTS BAND NFR BLD MANUAL: 1 % (ref 0–7)
NT-PROBNP SERPL-MCNC: 2593 PG/ML (ref 0–449)
O2 THERAPY: ABNORMAL
PATH INTERP BLD-IMP: NORMAL
PATH INTERP BLD-IMP: YES
PCO2 BLDA: 31.4 MM HG (ref 35–45)
PCO2 BLDA: 60 MMHG (ref 35–45)
PERFORMED ON: ABNORMAL
PERFORMED ON: ABNORMAL
PH BLDA: 7.16 [PH] (ref 7.35–7.45)
PH BLDA: 7.47 [PH] (ref 7.35–7.45)
PLATELET # BLD AUTO: 195 K/UL (ref 135–450)
PLATELET BLD QL SMEAR: ADEQUATE
PMV BLD AUTO: 8.4 FL (ref 5–10.5)
PO2 BLDA: 152.7 MM HG (ref 75–108)
PO2 BLDA: 319 MMHG (ref 75–108)
POC SAMPLE TYPE: ABNORMAL
POTASSIUM SERPL-SCNC: 3.8 MMOL/L (ref 3.5–5.1)
PROT SERPL-MCNC: 5.9 G/DL (ref 6.4–8.2)
PROTHROMBIN TIME: 15.9 SEC (ref 11.5–14.8)
RBC # BLD AUTO: 4.2 M/UL (ref 4–5.2)
SAO2 % BLDA: 100 % (ref 93–100)
SAO2 % BLDA: 99.8 %
SLIDE REVIEW: ABNORMAL
SODIUM SERPL-SCNC: 136 MMOL/L (ref 136–145)
TROPONIN, HIGH SENSITIVITY: 239 NG/L (ref 0–14)
TROPONIN, HIGH SENSITIVITY: 274 NG/L (ref 0–14)
TROPONIN, HIGH SENSITIVITY: 59 NG/L (ref 0–14)
VARIANT LYMPHS NFR BLD MANUAL: 5 % (ref 0–6)
WBC # BLD AUTO: 14.4 K/UL (ref 4–11)

## 2023-09-14 PROCEDURE — 2720000010 HC SURG SUPPLY STERILE

## 2023-09-14 PROCEDURE — 71045 X-RAY EXAM CHEST 1 VIEW: CPT

## 2023-09-14 PROCEDURE — 5A1935Z RESPIRATORY VENTILATION, LESS THAN 24 CONSECUTIVE HOURS: ICD-10-PCS | Performed by: SURGERY

## 2023-09-14 PROCEDURE — 6360000002 HC RX W HCPCS: Performed by: SURGERY

## 2023-09-14 PROCEDURE — 94761 N-INVAS EAR/PLS OXIMETRY MLT: CPT

## 2023-09-14 PROCEDURE — 2500000003 HC RX 250 WO HCPCS

## 2023-09-14 PROCEDURE — 2500000003 HC RX 250 WO HCPCS: Performed by: SURGERY

## 2023-09-14 PROCEDURE — 2580000003 HC RX 258: Performed by: SURGERY

## 2023-09-14 PROCEDURE — 84484 ASSAY OF TROPONIN QUANT: CPT

## 2023-09-14 PROCEDURE — 99291 CRITICAL CARE FIRST HOUR: CPT | Performed by: STUDENT IN AN ORGANIZED HEALTH CARE EDUCATION/TRAINING PROGRAM

## 2023-09-14 PROCEDURE — 2500000003 HC RX 250 WO HCPCS: Performed by: EMERGENCY MEDICINE

## 2023-09-14 PROCEDURE — 82803 BLOOD GASES ANY COMBINATION: CPT

## 2023-09-14 PROCEDURE — 87040 BLOOD CULTURE FOR BACTERIA: CPT

## 2023-09-14 PROCEDURE — 6360000004 HC RX CONTRAST MEDICATION: Performed by: SURGERY

## 2023-09-14 PROCEDURE — 85025 COMPLETE CBC W/AUTO DIFF WBC: CPT

## 2023-09-14 PROCEDURE — APPSS60 APP SPLIT SHARED TIME 46-60 MINUTES: Performed by: NURSE PRACTITIONER

## 2023-09-14 PROCEDURE — C1757 CATH, THROMBECTOMY/EMBOLECT: HCPCS

## 2023-09-14 PROCEDURE — 31500 INSERT EMERGENCY AIRWAY: CPT

## 2023-09-14 PROCEDURE — 93005 ELECTROCARDIOGRAM TRACING: CPT | Performed by: EMERGENCY MEDICINE

## 2023-09-14 PROCEDURE — C1760 CLOSURE DEV, VASC: HCPCS

## 2023-09-14 PROCEDURE — 36600 WITHDRAWAL OF ARTERIAL BLOOD: CPT

## 2023-09-14 PROCEDURE — 6360000004 HC RX CONTRAST MEDICATION: Performed by: PHYSICIAN ASSISTANT

## 2023-09-14 PROCEDURE — C1769 GUIDE WIRE: HCPCS

## 2023-09-14 PROCEDURE — 94002 VENT MGMT INPAT INIT DAY: CPT

## 2023-09-14 PROCEDURE — 36014 PLACE CATHETER IN ARTERY: CPT

## 2023-09-14 PROCEDURE — 99152 MOD SED SAME PHYS/QHP 5/>YRS: CPT

## 2023-09-14 PROCEDURE — 80053 COMPREHEN METABOLIC PANEL: CPT

## 2023-09-14 PROCEDURE — 36415 COLL VENOUS BLD VENIPUNCTURE: CPT

## 2023-09-14 PROCEDURE — C1887 CATHETER, GUIDING: HCPCS

## 2023-09-14 PROCEDURE — 86901 BLOOD TYPING SEROLOGIC RH(D): CPT

## 2023-09-14 PROCEDURE — 36556 INSERT NON-TUNNEL CV CATH: CPT

## 2023-09-14 PROCEDURE — 87150 DNA/RNA AMPLIFIED PROBE: CPT

## 2023-09-14 PROCEDURE — 93010 ELECTROCARDIOGRAM REPORT: CPT | Performed by: INTERNAL MEDICINE

## 2023-09-14 PROCEDURE — 99285 EMERGENCY DEPT VISIT HI MDM: CPT

## 2023-09-14 PROCEDURE — 2580000003 HC RX 258: Performed by: EMERGENCY MEDICINE

## 2023-09-14 PROCEDURE — 6370000000 HC RX 637 (ALT 250 FOR IP): Performed by: INTERNAL MEDICINE

## 2023-09-14 PROCEDURE — 71260 CT THORAX DX C+: CPT

## 2023-09-14 PROCEDURE — 75743 ARTERY X-RAYS LUNGS: CPT

## 2023-09-14 PROCEDURE — A4216 STERILE WATER/SALINE, 10 ML: HCPCS | Performed by: SURGERY

## 2023-09-14 PROCEDURE — 02HV33Z INSERTION OF INFUSION DEVICE INTO SUPERIOR VENA CAVA, PERCUTANEOUS APPROACH: ICD-10-PCS | Performed by: INTERNAL MEDICINE

## 2023-09-14 PROCEDURE — 0BH17EZ INSERTION OF ENDOTRACHEAL AIRWAY INTO TRACHEA, VIA NATURAL OR ARTIFICIAL OPENING: ICD-10-PCS | Performed by: SURGERY

## 2023-09-14 PROCEDURE — 2709999900 HC NON-CHARGEABLE SUPPLY

## 2023-09-14 PROCEDURE — 85520 HEPARIN ASSAY: CPT

## 2023-09-14 PROCEDURE — 86900 BLOOD TYPING SEROLOGIC ABO: CPT

## 2023-09-14 PROCEDURE — 2580000003 HC RX 258: Performed by: PHYSICIAN ASSISTANT

## 2023-09-14 PROCEDURE — 96375 TX/PRO/DX INJ NEW DRUG ADDON: CPT

## 2023-09-14 PROCEDURE — 37184 PRIM ART M-THRMBC 1ST VSL: CPT

## 2023-09-14 PROCEDURE — 99153 MOD SED SAME PHYS/QHP EA: CPT

## 2023-09-14 PROCEDURE — 85730 THROMBOPLASTIN TIME PARTIAL: CPT

## 2023-09-14 PROCEDURE — 6360000002 HC RX W HCPCS: Performed by: PHYSICIAN ASSISTANT

## 2023-09-14 PROCEDURE — 85610 PROTHROMBIN TIME: CPT

## 2023-09-14 PROCEDURE — 83605 ASSAY OF LACTIC ACID: CPT

## 2023-09-14 PROCEDURE — 02CR3ZZ EXTIRPATION OF MATTER FROM LEFT PULMONARY ARTERY, PERCUTANEOUS APPROACH: ICD-10-PCS | Performed by: SURGERY

## 2023-09-14 PROCEDURE — 2000000000 HC ICU R&B

## 2023-09-14 PROCEDURE — 83880 ASSAY OF NATRIURETIC PEPTIDE: CPT

## 2023-09-14 PROCEDURE — 6360000002 HC RX W HCPCS

## 2023-09-14 PROCEDURE — 6370000000 HC RX 637 (ALT 250 FOR IP): Performed by: SURGERY

## 2023-09-14 PROCEDURE — 96365 THER/PROPH/DIAG IV INF INIT: CPT

## 2023-09-14 PROCEDURE — B31T1ZZ FLUOROSCOPY OF LEFT PULMONARY ARTERY USING LOW OSMOLAR CONTRAST: ICD-10-PCS | Performed by: SURGERY

## 2023-09-14 PROCEDURE — 86850 RBC ANTIBODY SCREEN: CPT

## 2023-09-14 PROCEDURE — 2700000000 HC OXYGEN THERAPY PER DAY

## 2023-09-14 PROCEDURE — 6360000002 HC RX W HCPCS: Performed by: EMERGENCY MEDICINE

## 2023-09-14 PROCEDURE — 37187 VENOUS MECH THROMBECTOMY: CPT

## 2023-09-14 PROCEDURE — APPNB30 APP NON BILLABLE TIME 0-30 MINS: Performed by: NURSE PRACTITIONER

## 2023-09-14 RX ORDER — SODIUM CHLORIDE 9 MG/ML
INJECTION, SOLUTION INTRAVENOUS PRN
Status: DISCONTINUED | OUTPATIENT
Start: 2023-09-14 | End: 2023-09-19 | Stop reason: HOSPADM

## 2023-09-14 RX ORDER — HEPARIN SODIUM 1000 [USP'U]/ML
80 INJECTION, SOLUTION INTRAVENOUS; SUBCUTANEOUS ONCE
Status: COMPLETED | OUTPATIENT
Start: 2023-09-14 | End: 2023-09-14

## 2023-09-14 RX ORDER — 0.9 % SODIUM CHLORIDE 0.9 %
1000 INTRAVENOUS SOLUTION INTRAVENOUS ONCE
Status: COMPLETED | OUTPATIENT
Start: 2023-09-14 | End: 2023-09-14

## 2023-09-14 RX ORDER — HEPARIN SODIUM 1000 [USP'U]/ML
80 INJECTION, SOLUTION INTRAVENOUS; SUBCUTANEOUS PRN
Status: DISCONTINUED | OUTPATIENT
Start: 2023-09-14 | End: 2023-09-16

## 2023-09-14 RX ORDER — SODIUM CHLORIDE 0.9 % (FLUSH) 0.9 %
5-40 SYRINGE (ML) INJECTION PRN
Status: DISCONTINUED | OUTPATIENT
Start: 2023-09-14 | End: 2023-09-19 | Stop reason: HOSPADM

## 2023-09-14 RX ORDER — HEPARIN SODIUM 1000 [USP'U]/ML
80 INJECTION, SOLUTION INTRAVENOUS; SUBCUTANEOUS ONCE
Status: DISCONTINUED | OUTPATIENT
Start: 2023-09-14 | End: 2023-09-14 | Stop reason: SDUPTHER

## 2023-09-14 RX ORDER — MIDAZOLAM HYDROCHLORIDE 1 MG/ML
1-10 INJECTION, SOLUTION INTRAVENOUS CONTINUOUS
Status: DISCONTINUED | OUTPATIENT
Start: 2023-09-14 | End: 2023-09-14 | Stop reason: SDUPTHER

## 2023-09-14 RX ORDER — POLYVINYL ALCOHOL 14 MG/ML
1 SOLUTION/ DROPS OPHTHALMIC EVERY 4 HOURS
Status: DISCONTINUED | OUTPATIENT
Start: 2023-09-14 | End: 2023-09-15

## 2023-09-14 RX ORDER — ACETAMINOPHEN 325 MG/1
650 TABLET ORAL EVERY 6 HOURS PRN
Status: DISCONTINUED | OUTPATIENT
Start: 2023-09-14 | End: 2023-09-14 | Stop reason: SDUPTHER

## 2023-09-14 RX ORDER — ACETAMINOPHEN 325 MG/1
650 TABLET ORAL EVERY 4 HOURS PRN
Status: DISCONTINUED | OUTPATIENT
Start: 2023-09-14 | End: 2023-09-19 | Stop reason: HOSPADM

## 2023-09-14 RX ORDER — SODIUM CHLORIDE 0.9 % (FLUSH) 0.9 %
5-40 SYRINGE (ML) INJECTION EVERY 12 HOURS SCHEDULED
Status: DISCONTINUED | OUTPATIENT
Start: 2023-09-14 | End: 2023-09-19 | Stop reason: HOSPADM

## 2023-09-14 RX ORDER — ROCURONIUM BROMIDE 10 MG/ML
60 INJECTION, SOLUTION INTRAVENOUS ONCE
Status: COMPLETED | OUTPATIENT
Start: 2023-09-14 | End: 2023-09-14

## 2023-09-14 RX ORDER — HEPARIN SODIUM 1000 [USP'U]/ML
80 INJECTION, SOLUTION INTRAVENOUS; SUBCUTANEOUS PRN
Status: DISCONTINUED | OUTPATIENT
Start: 2023-09-14 | End: 2023-09-14

## 2023-09-14 RX ORDER — HEPARIN SODIUM 1000 [USP'U]/ML
40 INJECTION, SOLUTION INTRAVENOUS; SUBCUTANEOUS PRN
Status: DISCONTINUED | OUTPATIENT
Start: 2023-09-14 | End: 2023-09-16

## 2023-09-14 RX ORDER — HEPARIN SODIUM 10000 [USP'U]/100ML
5-30 INJECTION, SOLUTION INTRAVENOUS CONTINUOUS
Status: DISCONTINUED | OUTPATIENT
Start: 2023-09-14 | End: 2023-09-16

## 2023-09-14 RX ORDER — MINERAL OIL AND WHITE PETROLATUM 150; 830 MG/G; MG/G
OINTMENT OPHTHALMIC EVERY 4 HOURS
Status: DISCONTINUED | OUTPATIENT
Start: 2023-09-14 | End: 2023-09-15

## 2023-09-14 RX ORDER — ALBUTEROL SULFATE 90 UG/1
4 AEROSOL, METERED RESPIRATORY (INHALATION) EVERY 4 HOURS PRN
Status: DISCONTINUED | OUTPATIENT
Start: 2023-09-14 | End: 2023-09-19 | Stop reason: HOSPADM

## 2023-09-14 RX ORDER — MIDAZOLAM HYDROCHLORIDE 1 MG/ML
1-10 INJECTION, SOLUTION INTRAVENOUS CONTINUOUS
Status: DISCONTINUED | OUTPATIENT
Start: 2023-09-14 | End: 2023-09-14

## 2023-09-14 RX ORDER — SODIUM CHLORIDE, SODIUM LACTATE, POTASSIUM CHLORIDE, AND CALCIUM CHLORIDE .6; .31; .03; .02 G/100ML; G/100ML; G/100ML; G/100ML
1000 INJECTION, SOLUTION INTRAVENOUS ONCE
Status: COMPLETED | OUTPATIENT
Start: 2023-09-14 | End: 2023-09-14

## 2023-09-14 RX ORDER — ROCURONIUM BROMIDE 10 MG/ML
50 INJECTION, SOLUTION INTRAVENOUS ONCE
Status: DISCONTINUED | OUTPATIENT
Start: 2023-09-14 | End: 2023-09-14

## 2023-09-14 RX ORDER — POLYETHYLENE GLYCOL 3350 17 G/17G
17 POWDER, FOR SOLUTION ORAL DAILY PRN
Status: DISCONTINUED | OUTPATIENT
Start: 2023-09-14 | End: 2023-09-19 | Stop reason: HOSPADM

## 2023-09-14 RX ORDER — FENTANYL CITRATE-0.9 % NACL/PF 10 MCG/ML
25-200 PLASTIC BAG, INJECTION (ML) INTRAVENOUS CONTINUOUS
Status: DISCONTINUED | OUTPATIENT
Start: 2023-09-14 | End: 2023-09-14 | Stop reason: SDUPTHER

## 2023-09-14 RX ORDER — HEPARIN SODIUM 10000 [USP'U]/100ML
5-30 INJECTION, SOLUTION INTRAVENOUS CONTINUOUS
Status: DISCONTINUED | OUTPATIENT
Start: 2023-09-14 | End: 2023-09-14 | Stop reason: SDUPTHER

## 2023-09-14 RX ORDER — CHLORHEXIDINE GLUCONATE ORAL RINSE 1.2 MG/ML
15 SOLUTION DENTAL 2 TIMES DAILY
Status: DISCONTINUED | OUTPATIENT
Start: 2023-09-14 | End: 2023-09-15

## 2023-09-14 RX ORDER — ETOMIDATE 2 MG/ML
20 INJECTION INTRAVENOUS ONCE
Status: DISCONTINUED | OUTPATIENT
Start: 2023-09-14 | End: 2023-09-14 | Stop reason: SDUPTHER

## 2023-09-14 RX ORDER — ACETAMINOPHEN 650 MG/1
650 SUPPOSITORY RECTAL EVERY 6 HOURS PRN
Status: DISCONTINUED | OUTPATIENT
Start: 2023-09-14 | End: 2023-09-19 | Stop reason: HOSPADM

## 2023-09-14 RX ORDER — NOREPINEPHRINE BITARTRATE 0.06 MG/ML
1-100 INJECTION, SOLUTION INTRAVENOUS CONTINUOUS
Status: DISCONTINUED | OUTPATIENT
Start: 2023-09-14 | End: 2023-09-15

## 2023-09-14 RX ORDER — ONDANSETRON 2 MG/ML
4 INJECTION INTRAMUSCULAR; INTRAVENOUS EVERY 6 HOURS PRN
Status: DISCONTINUED | OUTPATIENT
Start: 2023-09-14 | End: 2023-09-19 | Stop reason: HOSPADM

## 2023-09-14 RX ORDER — SODIUM CHLORIDE, SODIUM LACTATE, POTASSIUM CHLORIDE, AND CALCIUM CHLORIDE .6; .31; .03; .02 G/100ML; G/100ML; G/100ML; G/100ML
1000 INJECTION, SOLUTION INTRAVENOUS ONCE
Status: DISCONTINUED | OUTPATIENT
Start: 2023-09-14 | End: 2023-09-14

## 2023-09-14 RX ORDER — PROPOFOL 10 MG/ML
5-50 INJECTION, EMULSION INTRAVENOUS CONTINUOUS
Status: DISCONTINUED | OUTPATIENT
Start: 2023-09-14 | End: 2023-09-15

## 2023-09-14 RX ORDER — ETOMIDATE 2 MG/ML
20 INJECTION INTRAVENOUS ONCE
Status: COMPLETED | OUTPATIENT
Start: 2023-09-14 | End: 2023-09-14

## 2023-09-14 RX ORDER — ONDANSETRON 4 MG/1
4 TABLET, ORALLY DISINTEGRATING ORAL EVERY 8 HOURS PRN
Status: DISCONTINUED | OUTPATIENT
Start: 2023-09-14 | End: 2023-09-19 | Stop reason: HOSPADM

## 2023-09-14 RX ORDER — HEPARIN SODIUM 1000 [USP'U]/ML
40 INJECTION, SOLUTION INTRAVENOUS; SUBCUTANEOUS PRN
Status: DISCONTINUED | OUTPATIENT
Start: 2023-09-14 | End: 2023-09-14

## 2023-09-14 RX ORDER — MIDAZOLAM HYDROCHLORIDE 5 MG/ML
10 INJECTION INTRAMUSCULAR; INTRAVENOUS ONCE
Status: COMPLETED | OUTPATIENT
Start: 2023-09-14 | End: 2023-09-14

## 2023-09-14 RX ADMIN — ROCURONIUM BROMIDE 60 MG: 10 INJECTION, SOLUTION INTRAVENOUS at 10:28

## 2023-09-14 RX ADMIN — MIDAZOLAM HYDROCHLORIDE 2 MG/HR: 1 INJECTION, SOLUTION INTRAVENOUS at 10:30

## 2023-09-14 RX ADMIN — Medication 65 MCG/HR: at 15:14

## 2023-09-14 RX ADMIN — CHLORHEXIDINE GLUCONATE 15 ML: 1.2 RINSE ORAL at 20:08

## 2023-09-14 RX ADMIN — PROPOFOL 5 MCG/KG/MIN: 10 INJECTION, EMULSION INTRAVENOUS at 15:28

## 2023-09-14 RX ADMIN — ETOMIDATE INJECTION 20 MG: 2 SOLUTION INTRAVENOUS at 10:00

## 2023-09-14 RX ADMIN — HEPARIN SODIUM 18 UNITS/KG/HR: 10000 INJECTION, SOLUTION INTRAVENOUS at 10:45

## 2023-09-14 RX ADMIN — Medication 10 MCG/MIN: at 10:35

## 2023-09-14 RX ADMIN — IOPAMIDOL 75 ML: 755 INJECTION, SOLUTION INTRAVENOUS at 10:18

## 2023-09-14 RX ADMIN — SODIUM CHLORIDE, POTASSIUM CHLORIDE, SODIUM LACTATE AND CALCIUM CHLORIDE 1000 ML: 600; 310; 30; 20 INJECTION, SOLUTION INTRAVENOUS at 11:56

## 2023-09-14 RX ADMIN — HEPARIN SODIUM 5080 UNITS: 1000 INJECTION INTRAVENOUS; SUBCUTANEOUS at 10:42

## 2023-09-14 RX ADMIN — Medication 50 MCG/HR: at 10:31

## 2023-09-14 RX ADMIN — MINERAL OIL, PETROLATUM: 425; 573 OINTMENT OPHTHALMIC at 20:08

## 2023-09-14 RX ADMIN — MIDAZOLAM HYDROCHLORIDE 10 MG: 5 INJECTION, SOLUTION INTRAMUSCULAR; INTRAVENOUS at 10:27

## 2023-09-14 RX ADMIN — SODIUM CHLORIDE 1000 ML: 9 INJECTION, SOLUTION INTRAVENOUS at 10:34

## 2023-09-14 RX ADMIN — CHLORHEXIDINE GLUCONATE 15 ML: 1.2 RINSE ORAL at 15:20

## 2023-09-14 RX ADMIN — POLYVINYL ALCOHOL 1 DROP: 14 SOLUTION/ DROPS OPHTHALMIC at 15:03

## 2023-09-14 RX ADMIN — POLYVINYL ALCOHOL 1 DROP: 14 SOLUTION/ DROPS OPHTHALMIC at 21:45

## 2023-09-14 RX ADMIN — IOPAMIDOL 40 ML: 755 INJECTION, SOLUTION INTRAVENOUS at 14:02

## 2023-09-14 RX ADMIN — FAMOTIDINE 20 MG: 10 INJECTION, SOLUTION INTRAVENOUS at 15:06

## 2023-09-14 ASSESSMENT — ENCOUNTER SYMPTOMS
DIARRHEA: 0
COUGH: 0
NAUSEA: 0
RHINORRHEA: 0
VOMITING: 0
SHORTNESS OF BREATH: 1
ABDOMINAL PAIN: 0
WHEEZING: 0

## 2023-09-14 ASSESSMENT — PULMONARY FUNCTION TESTS
PIF_VALUE: 21
PIF_VALUE: 26
PIF_VALUE: 23

## 2023-09-14 NOTE — ED NOTES
Procedural consent signed at this time by daughter, Esther Peng.       Fauzia Trent RN  09/14/23 8572

## 2023-09-14 NOTE — ED PROVIDER NOTES
[facility]        Pt Name: Melany Dodge  MRN: [id number]  [address] [facility] [place] 1940  Date of evaluation: 9/14/2023  Provider: Jony Carpenter PA-C  PCP: Felix Monson MD  Note Started: 10:11 AM EDT 9/14/23       I have seen and evaluated this patient with my supervising physician Dr. Jenise Oconnell. CHIEF COMPLAINT       Chief Complaint   Patient presents with    Shortness of Breath     Came in by [facility] from home with severe SOB with right mastectomy a week ago. Upon arrival HR went down to 30, started coding and intubating. HISTORY OF PRESENT ILLNESS: 1 or more Elements     History From: patient, daughter   Limitations to history : None    Melany Dodge is a 79 y.o. female who presents for evaluation of shortness of breath after syncopal episode. Patient is 1 week status post a right-sided mastectomy secondary to diagnosis of invasive ductal carcinoma. Patient started feeling short of breath 2 days ago. No chest pain. No abdominal pain nausea vomiting. No fevers or chills. She has not been in contact with the surgeon regarding the shortness of breath. Today had stood up off the couch and had syncopal episode, this was witnessed. Patient states that she cannot breathe. Arrived on nonrebreather, EMS reports hypoxia at 85% upon arrival.  No prior oxygen requirement. No additional information is able to be obtained at this time. Nursing Notes were all reviewed and agreed with or any disagreements were addressed in the HPI. REVIEW OF SYSTEMS :      Review of Systems   Unable to perform ROS: Acuity of condition   Constitutional:  Negative for appetite change, chills and fever. HENT:  Negative for congestion and rhinorrhea. Respiratory:  Positive for shortness of breath. Negative for cough and wheezing. Cardiovascular:  Negative for chest pain.    Gastrointestinal:  Negative for abdominal pain,

## 2023-09-14 NOTE — ED NOTES
..ED TO INPATIENT SBAR HANDOFF    Patient Name: Isaías Piper   :  1940  79 y.o. MRN:  [id number]  Preferred Name  [address] [place] [address]   ED Room #:  TERRY/NONE  Family/Caregiver Present yes   Restraints no   Sitter no   Sepsis Risk Score Sepsis Risk Score: 2.11    Situation  Code Status: Prior FULL CODE    Allergies: Cortisone  Weight: Patient Vitals for the past 96 hrs (Last 3 readings):   Weight   23 0936 140 lb (63.5 kg)     Arrived from: home  Chief Complaint:   Chief Complaint   Patient presents with    Shortness of Breath     Came in by [facility] from home with severe SOB with right mastectomy a week ago. Upon arrival HR went down to 30, started coding and intubating. Hospital Problem/Diagnosis:  Principal Problem:    Cardiac arrest due to respiratory disorder [facility])  Active Problems:    Invasive ductal carcinoma of breast, stage 2, right (HCC)    Bilateral pulmonary embolism (HCC)    S/P right mastectomy    Acute respiratory failure with hypoxia (HCC)    Cardiopulmonary arrest with successful resuscitation ([address])    Shock circulatory ([address])  Resolved Problems:    * No resolved hospital problems. *    Imaging:   XR CHEST PORTABLE   Final Result   No acute cardiopulmonary findings. CT CHEST PULMONARY EMBOLISM W CONTRAST   Final Result   Moderate size bilateral pulmonary emboli. While the main pulmonary artery is   not enlarged, there is reflux of contrast into the IVC and hepatic veins   suggesting right heart insufficiency. .  The right ventricle is also larger   than the left ventricle      Scattered ground-glass opacity seen throughout the lungs, nonspecific. This   could be due to atelectasis, early pneumonia, or early edema. RECOMMENDATIONS:   Results discussed with Dr. Miguel Adams MD at 10:49 am on   2023           Abnormal labs:   Abnormal Labs Reviewed   CBC WITH AUTO DIFFERENTIAL - Abnormal; Notable for the following components:       Result Value    WBC

## 2023-09-14 NOTE — OP NOTE
[facility]                     [address], [address]                                OPERATIVE REPORT    PATIENT NAME: Odin MEDEL                  :        1940  MED REC NO:   [id number]                          ROOM:       2908  ACCOUNT NO:   [de-identified]                           ADMIT DATE: 2023  PROVIDER:     Tim Flanagan MD    DATE OF PROCEDURE:  2023    PREOPERATIVE DIAGNOSIS:  Acute PE with bilateral cor pulmonale. POSTOPERATIVE DIAGNOSIS:  Acute PE with bilateral cor pulmonale. OPERATION PERFORMED:  1. Ultrasound-guided left common femoral venous access. 2.  Selective right and left pulmonary artery angiogram.  3.  Pulmonary thrombectomy (Inari). SURGEON:  Tim Flanagan MD    ANESTHESIA:  Local/IV. ESTIMATED BLOOD LOSS:  Minimal.    COMPLICATIONS:  None. INDICATIONS:  The patient is an 30-year-old female who presented to the  emergency department with acute shortness of breath. She had a code  arrest with return of spontaneous circulation and was intubated. CT  scan showed massive bilateral PE with recommendation for pulmonary  artery thrombectomy. All risks, benefits, and alternative were  discussed in detail. All questions were answered. PROCEDURE:  After witnessed form consent was obtained, the patient was  brought to the cath lab where under my supervision Versed and fentanyl  were administered intravenously for moderate sedation. Pulse oximetry,  heart rate, and blood pressure monitored by an independent trained  observer that was present. I spent 57 minutes of face-to-face sedation  time with the patient. Ultrasound was used to identify the left common  femoral vein, which was noted to be patent. An image was saved to the  patient's permanent medical record. Under direct visualization, it was  accessed with a 4-Barbadian micropuncture needle. Dilators were placed.   A  6-Barbadian ProGlide

## 2023-09-14 NOTE — H&P
BREAST BIOPSY W LOC DEVICE 1ST LESION RIGHT Right 07/11/2023     BREAST BIOPSY W LOC DEVICE 1ST LESION RIGHT 7/11/2023 [facility] ULTRASOUND       Medications Prior to Admission:    Prior to Admission medications    Medication Sig Start Date End Date Taking? Authorizing Provider   Polyvinyl Alcohol-Povidone (REFRESH OP) Apply to eye  Patient not taking: Reported on 9/6/2023    Historical Provider, MD   pravastatin (PRAVACHOL) 20 MG tablet Take 1 tablet by mouth daily 8/16/23   Deisi Ceron MD   hydroCHLOROthiazide (HYDRODIURIL) 25 MG tablet Take 1 tablet by mouth every morning 6/12/23   Deisi Ceron MD   lisinopril (PRINIVIL;ZESTRIL) 20 MG tablet Take 1 tablet by mouth 2 times daily 4/24/23   Deisi Ceron MD   Biotin 5000 MCG TABS Take by mouth daily    Historical Provider, MD   MAGNESIUM-ZINC PO Take by mouth 2 times daily    Historical Provider, MD   CALCIUM CITRATE PO Take by mouth daily    Historical Provider, MD   aspirin 81 MG EC tablet Take 1 tablet by mouth daily    Historical Provider, MD   Multiple Vitamins-Minerals (CENTRUM SILVER PO) Take 1 tablet by mouth daily. Historical Provider, MD       Allergies:  Cortisone    Social History:  The patient currently lives with family    TOBACCO:   reports that she has never smoked. She has never used smokeless tobacco.  ETOH:   reports current alcohol use. Family History:  Reviewed in detail and negative for DM, Early CAD, Cancer, CVA.  Positive as follows:        Problem Relation Age of Onset    Heart Disease Mother     Diabetes Mother         age related Type 2    High Blood Pressure Mother     Vision Loss Mother     High Cholesterol Mother     Arthritis Father     Cancer Father     High Blood Pressure Father     Heart Disease Father     High Cholesterol Father     Rheum Arthritis Father     Cancer Maternal Aunt 61        breast    High Blood Pressure Maternal Aunt     Breast Cancer Maternal Aunt     Cancer Maternal Uncle     High Blood Pressure

## 2023-09-14 NOTE — ED NOTES
Upon arrival    Laureano given at 5595. Bárbara Gonzalez. RT is at bedside. MD Delmis at bedside. Intubating in progress. 22 at the lip. Positive color change. Positive breath sounds. Versed given [id number].      Giuseppe Carlisle RN  09/14/23 2627

## 2023-09-14 NOTE — TELEPHONE ENCOUNTER
Bisi's daughter Cele Freedman called in this morning to notify us Bisi's home 02 monitor was reading in the 80's @ room air. Cele Freedman was instructed to take Carolyn Paige to the hospital for evaluation. Cele Freedman called me back saying \"  mom had to be lowered to the floor while in the bathroom I called 911. \" Cele Freedman will call me later to update our office. When she calls back please send the call to me.        Thanks, Bj Woodall

## 2023-09-14 NOTE — OP NOTE
Operative Note      Patient: Patricia Velazquez  YOB: 1940  MRN: [id number]    Date of Procedure:     * No surgery found *    Post-Op Diagnosis: Same           * Surgery not found *    Assistant:   * Surgery not found *    Anesthesia: * No surgery found *    Estimated Blood Loss (mL): Minimal    Complications: None    Specimens:   * Cannot find log *    Implants:  * No surgical log found *      Drains:   [REMOVED] Closed/Suction Drain Right Breast Bulb (Removed)             Findings: as above        Electronically signed by Clyde Velasquez MD on 9/14/2023 at 1:59 PM

## 2023-09-14 NOTE — ED PROVIDER NOTES
In addition to the advanced practice provider, I personally saw Dalia Jimenez and performed a substantive portion of the visit including all aspects of the medical decision making. Briefly, this is a 79 y.o. female here for shortness of breath which has been worsening over the past 2 days. Today while standing up she had an episode of loss of consciousness. On EMS arrival she was hypoxic and placed on nonrebreather. Shortly after arrival to the emergency department, patient developed severe bradycardia which progressed to cardiac arrest, I was called emergently to room as CPR was underway by nursing staff. .  She is approximately 1 week status post right mastectomy due to underlying breast cancer. ROSC obtained after approximately 60 seconds of CPR, after which patient became alert and conversant, however remained hypoxic and in respiratory distress. On exam, patient afebrile however critically ill-appearing. Skin is cool and pale. She is tachypneic and in respiratory distress. Heart tachycardic, regular rhythm. Lungs CTAB. Abdomen soft, nondistended. Surgical incision overlying right breast regionappears well approximated without surrounding erythema. No lower extremity edema. EKG  EKG was reviewed by emergency department physician in the absence of a cardiologist    Narrow complex sinus rhythm, rate 139, normal axis, normal NH and QRS intervals, normal Qtc, no specific ST elevations or depressions, normal t-wave morphology, impression sinus tachycardia with incomplete RBBB, no STEMI, S1Q3T3 present which is not evident on comparison EKG from 8/16/2023      Screenings   Mary Lou Coma Scale  Eye Opening: Spontaneous  Best Verbal Response: Confused  Best Motor Response: Localizes pain  Mary Lou Coma Scale Score: 13      Is this patient to be included in the SEP-1 Core Measure due to severe sepsis or septic shock?    No   Exclusion criteria - the patient is NOT to be included for SEP-1 Core Measure

## 2023-09-15 ENCOUNTER — APPOINTMENT (OUTPATIENT)
Dept: GENERAL RADIOLOGY | Age: 83
DRG: 163 | End: 2023-09-15
Payer: MEDICARE

## 2023-09-15 PROBLEM — Z01.818 PREOP EXAMINATION: Status: RESOLVED | Noted: 2023-08-16 | Resolved: 2023-09-15

## 2023-09-15 PROBLEM — R79.89 ELEVATED LACTIC ACID LEVEL: Status: ACTIVE | Noted: 2023-09-15

## 2023-09-15 PROBLEM — R79.89 ELEVATED TROPONIN: Status: ACTIVE | Noted: 2023-09-15

## 2023-09-15 PROBLEM — R77.8 ELEVATED TROPONIN: Status: ACTIVE | Noted: 2023-09-15

## 2023-09-15 PROBLEM — R74.01 TRANSAMINITIS: Status: ACTIVE | Noted: 2023-09-15

## 2023-09-15 PROBLEM — I82.452 ACUTE DEEP VEIN THROMBOSIS (DVT) OF LEFT PERONEAL VEIN (HCC): Status: ACTIVE | Noted: 2023-09-15

## 2023-09-15 LAB
ALBUMIN SERPL-MCNC: 3.1 G/DL (ref 3.4–5)
ALBUMIN/GLOB SERPL: 1.2 {RATIO} (ref 1.1–2.2)
ALP SERPL-CCNC: 104 U/L (ref 40–129)
ALT SERPL-CCNC: 489 U/L (ref 10–40)
ANION GAP SERPL CALCULATED.3IONS-SCNC: 14 MMOL/L (ref 3–16)
ANTI-XA UNFRAC HEPARIN: 0.54 IU/ML (ref 0.3–0.7)
ANTI-XA UNFRAC HEPARIN: 0.57 IU/ML (ref 0.3–0.7)
ANTI-XA UNFRAC HEPARIN: 0.73 IU/ML (ref 0.3–0.7)
ANTI-XA UNFRAC HEPARIN: 0.87 IU/ML (ref 0.3–0.7)
AST SERPL-CCNC: 417 U/L (ref 15–37)
BACTERIA URNS QL MICRO: ABNORMAL /HPF
BASE EXCESS BLDA CALC-SCNC: 1.6 MMOL/L (ref -3–3)
BASOPHILS # BLD: 0.1 K/UL (ref 0–0.2)
BASOPHILS NFR BLD: 0.6 %
BILIRUB SERPL-MCNC: 0.4 MG/DL (ref 0–1)
BILIRUB UR QL STRIP.AUTO: NEGATIVE
BUN SERPL-MCNC: 23 MG/DL (ref 7–20)
CALCIUM SERPL-MCNC: 8.7 MG/DL (ref 8.3–10.6)
CHLORIDE SERPL-SCNC: 104 MMOL/L (ref 99–110)
CLARITY UR: ABNORMAL
CO2 BLDA-SCNC: 57.3 MMOL/L
CO2 SERPL-SCNC: 21 MMOL/L (ref 21–32)
COHGB MFR BLDA: 0.6 % (ref 0–1.5)
COLOR UR: ABNORMAL
CREAT SERPL-MCNC: 1 MG/DL (ref 0.6–1.2)
DEPRECATED RDW RBC AUTO: 13.5 % (ref 12.4–15.4)
EOSINOPHIL # BLD: 0.1 K/UL (ref 0–0.6)
EOSINOPHIL NFR BLD: 0.5 %
EPI CELLS #/AREA URNS AUTO: 1 /HPF (ref 0–5)
GFR SERPLBLD CREATININE-BSD FMLA CKD-EPI: 56 ML/MIN/{1.73_M2}
GLUCOSE SERPL-MCNC: 126 MG/DL (ref 70–99)
GLUCOSE UR STRIP.AUTO-MCNC: NEGATIVE MG/DL
HCO3 BLDA-SCNC: 24.6 MMOL/L (ref 21–29)
HCT VFR BLD AUTO: 37.8 % (ref 36–48)
HGB BLD-MCNC: 12.5 G/DL (ref 12–16)
HGB BLDA-MCNC: 12.2 G/DL (ref 12–16)
HGB UR QL STRIP.AUTO: ABNORMAL
HYALINE CASTS #/AREA URNS AUTO: 3 /LPF (ref 0–8)
KETONES UR STRIP.AUTO-MCNC: ABNORMAL MG/DL
LACTATE BLDV-SCNC: 1.6 MMOL/L (ref 0.4–2)
LEUKOCYTE ESTERASE UR QL STRIP.AUTO: ABNORMAL
LYMPHOCYTES # BLD: 2.3 K/UL (ref 1–5.1)
LYMPHOCYTES NFR BLD: 13.4 %
MAGNESIUM SERPL-MCNC: 1.9 MG/DL (ref 1.8–2.4)
MCH RBC QN AUTO: 32.1 PG (ref 26–34)
MCHC RBC AUTO-ENTMCNC: 33.1 G/DL (ref 31–36)
MCV RBC AUTO: 97.1 FL (ref 80–100)
METHGB MFR BLDA: 0.3 %
MONOCYTES # BLD: 1.2 K/UL (ref 0–1.3)
MONOCYTES NFR BLD: 7.3 %
NEUTROPHILS # BLD: 13.4 K/UL (ref 1.7–7.7)
NEUTROPHILS NFR BLD: 78.2 %
NITRITE UR QL STRIP.AUTO: NEGATIVE
O2 THERAPY: ABNORMAL
PCO2 BLDA: 32.6 MMHG (ref 35–45)
PH BLDA: 7.49 [PH] (ref 7.35–7.45)
PH UR STRIP.AUTO: 5.5 [PH] (ref 5–8)
PHOSPHATE SERPL-MCNC: 2.9 MG/DL (ref 2.5–4.9)
PLATELET # BLD AUTO: 209 K/UL (ref 135–450)
PMV BLD AUTO: 8.3 FL (ref 5–10.5)
PO2 BLDA: 177 MMHG (ref 75–108)
POTASSIUM SERPL-SCNC: 4.2 MMOL/L (ref 3.5–5.1)
PROCALCITONIN SERPL IA-MCNC: 1.83 NG/ML (ref 0–0.15)
PROT SERPL-MCNC: 5.7 G/DL (ref 6.4–8.2)
PROT UR STRIP.AUTO-MCNC: 30 MG/DL
RBC # BLD AUTO: 3.89 M/UL (ref 4–5.2)
RBC CLUMPS #/AREA URNS AUTO: 219 /HPF (ref 0–4)
REPORT: NORMAL
SAO2 % BLDA: 100 %
SODIUM SERPL-SCNC: 139 MMOL/L (ref 136–145)
SP GR UR STRIP.AUTO: >=1.03 (ref 1–1.03)
TROPONIN, HIGH SENSITIVITY: 211 NG/L (ref 0–14)
UA COMPLETE W REFLEX CULTURE PNL UR: ABNORMAL
UA DIPSTICK W REFLEX MICRO PNL UR: YES
URN SPEC COLLECT METH UR: ABNORMAL
UROBILINOGEN UR STRIP-ACNC: 1 E.U./DL
WBC # BLD AUTO: 17.1 K/UL (ref 4–11)
WBC #/AREA URNS AUTO: 7 /HPF (ref 0–5)

## 2023-09-15 PROCEDURE — 94761 N-INVAS EAR/PLS OXIMETRY MLT: CPT

## 2023-09-15 PROCEDURE — 93970 EXTREMITY STUDY: CPT

## 2023-09-15 PROCEDURE — 94003 VENT MGMT INPAT SUBQ DAY: CPT

## 2023-09-15 PROCEDURE — 83735 ASSAY OF MAGNESIUM: CPT

## 2023-09-15 PROCEDURE — 2580000003 HC RX 258: Performed by: NURSE PRACTITIONER

## 2023-09-15 PROCEDURE — 2000000000 HC ICU R&B

## 2023-09-15 PROCEDURE — 83605 ASSAY OF LACTIC ACID: CPT

## 2023-09-15 PROCEDURE — 2500000003 HC RX 250 WO HCPCS: Performed by: SURGERY

## 2023-09-15 PROCEDURE — 84145 PROCALCITONIN (PCT): CPT

## 2023-09-15 PROCEDURE — 6360000002 HC RX W HCPCS: Performed by: SURGERY

## 2023-09-15 PROCEDURE — 92526 ORAL FUNCTION THERAPY: CPT

## 2023-09-15 PROCEDURE — 99233 SBSQ HOSP IP/OBS HIGH 50: CPT | Performed by: STUDENT IN AN ORGANIZED HEALTH CARE EDUCATION/TRAINING PROGRAM

## 2023-09-15 PROCEDURE — C8929 TTE W OR WO FOL WCON,DOPPLER: HCPCS

## 2023-09-15 PROCEDURE — 6370000000 HC RX 637 (ALT 250 FOR IP): Performed by: SURGERY

## 2023-09-15 PROCEDURE — 2580000003 HC RX 258: Performed by: SURGERY

## 2023-09-15 PROCEDURE — APPNB30 APP NON BILLABLE TIME 0-30 MINS: Performed by: NURSE PRACTITIONER

## 2023-09-15 PROCEDURE — A4216 STERILE WATER/SALINE, 10 ML: HCPCS | Performed by: SURGERY

## 2023-09-15 PROCEDURE — 99223 1ST HOSP IP/OBS HIGH 75: CPT | Performed by: STUDENT IN AN ORGANIZED HEALTH CARE EDUCATION/TRAINING PROGRAM

## 2023-09-15 PROCEDURE — 82803 BLOOD GASES ANY COMBINATION: CPT

## 2023-09-15 PROCEDURE — 80053 COMPREHEN METABOLIC PANEL: CPT

## 2023-09-15 PROCEDURE — 85025 COMPLETE CBC W/AUTO DIFF WBC: CPT

## 2023-09-15 PROCEDURE — 2580000003 HC RX 258: Performed by: INTERNAL MEDICINE

## 2023-09-15 PROCEDURE — 6360000004 HC RX CONTRAST MEDICATION: Performed by: INTERNAL MEDICINE

## 2023-09-15 PROCEDURE — APPSS30 APP SPLIT SHARED TIME 16-30 MINUTES: Performed by: NURSE PRACTITIONER

## 2023-09-15 PROCEDURE — 84100 ASSAY OF PHOSPHORUS: CPT

## 2023-09-15 PROCEDURE — 81001 URINALYSIS AUTO W/SCOPE: CPT

## 2023-09-15 PROCEDURE — 92610 EVALUATE SWALLOWING FUNCTION: CPT

## 2023-09-15 PROCEDURE — 85520 HEPARIN ASSAY: CPT

## 2023-09-15 PROCEDURE — 71045 X-RAY EXAM CHEST 1 VIEW: CPT

## 2023-09-15 PROCEDURE — 84484 ASSAY OF TROPONIN QUANT: CPT

## 2023-09-15 RX ORDER — SODIUM CHLORIDE, SODIUM LACTATE, POTASSIUM CHLORIDE, CALCIUM CHLORIDE 600; 310; 30; 20 MG/100ML; MG/100ML; MG/100ML; MG/100ML
INJECTION, SOLUTION INTRAVENOUS ONCE
Status: COMPLETED | OUTPATIENT
Start: 2023-09-15 | End: 2023-09-15

## 2023-09-15 RX ADMIN — Medication 65 MCG/HR: at 00:40

## 2023-09-15 RX ADMIN — Medication 10 ML: at 09:46

## 2023-09-15 RX ADMIN — Medication 125 MCG/HR: at 06:04

## 2023-09-15 RX ADMIN — POLYVINYL ALCOHOL 1 DROP: 14 SOLUTION/ DROPS OPHTHALMIC at 09:46

## 2023-09-15 RX ADMIN — POLYVINYL ALCOHOL 1 DROP: 14 SOLUTION/ DROPS OPHTHALMIC at 06:52

## 2023-09-15 RX ADMIN — CHLORHEXIDINE GLUCONATE 15 ML: 1.2 RINSE ORAL at 09:45

## 2023-09-15 RX ADMIN — Medication 10 ML: at 20:47

## 2023-09-15 RX ADMIN — SODIUM CHLORIDE, PRESERVATIVE FREE 10 ML: 5 INJECTION INTRAVENOUS at 09:45

## 2023-09-15 RX ADMIN — FAMOTIDINE 20 MG: 10 INJECTION, SOLUTION INTRAVENOUS at 09:44

## 2023-09-15 RX ADMIN — SODIUM CHLORIDE, POTASSIUM CHLORIDE, SODIUM LACTATE AND CALCIUM CHLORIDE: 600; 310; 30; 20 INJECTION, SOLUTION INTRAVENOUS at 21:59

## 2023-09-15 RX ADMIN — HEPARIN SODIUM 12 UNITS/KG/HR: 10000 INJECTION, SOLUTION INTRAVENOUS at 13:17

## 2023-09-15 RX ADMIN — ACETAMINOPHEN 650 MG: 325 TABLET ORAL at 16:53

## 2023-09-15 RX ADMIN — SODIUM CHLORIDE, PRESERVATIVE FREE 10 ML: 5 INJECTION INTRAVENOUS at 20:47

## 2023-09-15 RX ADMIN — Medication 50 MCG/HR: at 08:15

## 2023-09-15 RX ADMIN — PERFLUTREN 1.5 ML: 6.52 INJECTION, SUSPENSION INTRAVENOUS at 13:14

## 2023-09-15 ASSESSMENT — PULMONARY FUNCTION TESTS
PIF_VALUE: 20
PIF_VALUE: 10
PIF_VALUE: 20

## 2023-09-15 ASSESSMENT — PAIN SCALES - GENERAL: PAINLEVEL_OUTOF10: 0

## 2023-09-15 NOTE — CONSULTS
[facility]  Consultation Note    Chief Complaint / Reason for Consultation  PE    History of Present Illness  Patient is a 79 y.o. female with past medical history of HTN and HLD who is s/p right total mastectomy on 9/6/23 with Dr. Seun Patel for breast cancer. She presented to the ED with SOB and tachycardia. Not shortly after arrival she coded and received CPR with ROSC within 1 minute. She was neurologically intact and was intubated for airway protection for CTPE chest which showed  a moderate sive bilateral PE with RV strain. She remains intubated, tachycardic in the 110-120s, SBP in the 90s and started on Levophed drip. She has been started on Heparin drip. We have been consulted for further evaluation and recommendations.      Review of Systems  Unable to assess 2/2 patient intubated and sedated    Past Medical History:   Diagnosis Date    Cancer ([address])     breast    COVID-19     2020, 2022  mild    Dry eye     Hyperlipidemia     Hypertension     Murmur, heart     Sinus congestion        Past Surgical History:   Procedure Laterality Date    EYE SURGERY Bilateral     cataract    MASTECTOMY Right 9/6/2023    RIGHT BREAST TOTAL MASTECTOMY, RIGHT SENTINEL LYMPH NODE BIOPSY, TECHNETIUM NINETY-NINE AND INJECTABLE BLUE DYE IN OPERATING ROOM performed by Luis Collado MD at [facility] NEEDLE ADDITIONAL RIGHT Right 07/11/2023    US BREAST BIOPSY NEEDLE ADDITIONAL RIGHT 7/11/2023 [facility] ULTRASOUND    US BREAST BIOPSY W LOC DEVICE 1ST LESION RIGHT Right 07/11/2023    US BREAST BIOPSY W LOC DEVICE 1ST LESION RIGHT 7/11/2023 [facility] ULTRASOUND       Allergies   Allergen Reactions    Cortisone Rash     Cortisone cream       Social History     Socioeconomic History    Marital status:      Spouse name: Not on file    Number of children: Not on file    Years of education: Not on file    Highest education level: Not on file
Pharmacy to check patient copay for  Xarelto/Eliquis    Copay for patient will be: $313.70/month. 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 Pharmacy will continue to follow the decision on therapy and  the patient if appropriate.        Lorri Avila PharmD, [facility]  Clinical Pharmacist  [id number]
Pulmonary and Critical Care Medicine    CC: Intubated  Date: 9/14/2023  Admit Date:  9/14/2023  Reason for Consultation: Cardiac arrest   Consult Requesting Physician: No att. providers found       HPI     This is a 80 y/o F w/ a hx of recently diagnosed breath CA s/p mastectomy, HTN, HLD who presented to [facility] ER with SOB and hypoxia. In the ER patient had bradycardia, ?lost pulse, CPR was started and after 60 seconds she has ROSC. Post ROSC she was talking but remained hypoxic thus she was intubated. CT-PE showed bilateral PE with RV strain, she needed levo post ROSC. She was seen by Vascular surgery and underwent percutaneous thrombectomy. Post procedure she was transferred to the ICU and Critical care medicine was consulted to help with the management. On my exam patient was in room CVU 8, she was on versed, fentanyl and propofol. She was levo 6. BP, HR was stable. She has a lozano with 300 cc made in the past 3 hrs. She was on VC 16/400/60/10. ROS: unable to obtain given she is intubated     PMH:  has a past medical history of Cancer ([address]), COVID-19, Dry eye, Hyperlipidemia, Hypertension, Murmur, heart, and Sinus congestion. PSH:  has a past surgical history that includes eye surgery (Bilateral); Tonsillectomy and adenoidectomy; US BREAST BIOPSY W LOC DEVICE 1ST LESION RIGHT (Right, 07/11/2023); US BREAST BIOPSY W LOC DEVICE EACH ADDL LESION RIGHT (Right, 07/11/2023); and Mastectomy (Right, 9/6/2023). SH:  reports that she has never smoked. She has never used smokeless tobacco. She reports current alcohol use. She reports that she does not use drugs. FH: family history includes Arthritis in her father; Asthma in her son; Breast Cancer in her maternal aunt and maternal cousin; Cancer in her father, maternal cousin, maternal uncle, maternal uncle, and maternal uncle;  Cancer (age of onset: 61) in her maternal aunt; Diabetes in her maternal cousin and mother; Early Death in her maternal cousin and
Polyvinyl Alcohol-Povidone (REFRESH OP) Apply to eye    Historical Provider, MD   pravastatin (PRAVACHOL) 20 MG tablet Take 1 tablet by mouth daily 8/16/23   Zay Davidson MD   hydroCHLOROthiazide (HYDRODIURIL) 25 MG tablet Take 1 tablet by mouth every morning 6/12/23   Zay Davidson MD   lisinopril (PRINIVIL;ZESTRIL) 20 MG tablet Take 1 tablet by mouth 2 times daily 4/24/23   Zay Davidson MD   Biotin 5000 MCG TABS Take by mouth daily    Historical Provider, MD   MAGNESIUM-ZINC PO Take by mouth 2 times daily    Historical Provider, MD   CALCIUM CITRATE PO Take by mouth daily    Historical Provider, MD   aspirin 81 MG EC tablet Take 1 tablet by mouth daily    Historical Provider, MD   Multiple Vitamins-Minerals (CENTRUM SILVER PO) Take 1 tablet by mouth daily.       Historical Provider, MD        Current Medications:  Current Facility-Administered Medications   Medication Dose Route Frequency Provider Last Rate Last Admin    heparin (porcine) injection 5,080 Units  80 Units/kg IntraVENous PRN Rc Sands II, MD        heparin (porcine) injection 2,540 Units  40 Units/kg IntraVENous PRN Rc Sands II, MD        heparin 25,000 units in dextrose 5% 250 mL (premix) infusion  5-30 Units/kg/hr IntraVENous Continuous Rc Sands II, MD 7.6 mL/hr at 09/15/23 1317 12 Units/kg/hr at 09/15/23 1317    sodium chloride flush 0.9 % injection 5-40 mL  5-40 mL IntraVENous 2 times per day Reza Guzman MD   10 mL at 09/15/23 0946    sodium chloride flush 0.9 % injection 5-40 mL  5-40 mL IntraVENous PRN Reza Guzman MD        0.9 % sodium chloride infusion   IntraVENous PRN Reza Guzman MD        ondansetron (ZOFRAN-ODT) disintegrating tablet 4 mg  4 mg Oral Q8H PRN Reza Guzman MD        Or    ondansetron (ZOFRAN) injection 4 mg  4 mg IntraVENous Q6H PRN Reza Guzman MD        polyethylene glycol (GLYCOLAX) packet 17 g  17 g Oral Daily PRN Wayne Vernon MD

## 2023-09-15 NOTE — PLAN OF CARE
Problem: Safety - Adult  Goal: Free from fall injury  Outcome: Progressing     Problem: Safety - Medical Restraint  Goal: Remains free of injury from restraints (Restraint for Interference with Medical Device)  Description: INTERVENTIONS:  1. Determine that other, less restrictive measures have been tried or would not be effective before applying the restraint  2. Evaluate the patient's condition at the time of restraint application  3. Inform patient/family regarding the reason for restraint  4.  Q2H: Monitor safety, psychosocial status, comfort, nutrition and hydration  9/15/2023 1805 by JAYLEN Reyes CNP  Outcome: Completed  9/15/2023 0730 by JAYLEN Reyes CNP  Outcome: Progressing  Flowsheets (Taken 9/14/2023 1900 by Cristian Blair RN)  Remains free of injury from restraints (restraint for interference with medical device):   Determine that other, less restrictive measures have been tried or would not be effective before applying the restraint   Evaluate the patient's condition at the time of restraint application   Inform patient/family regarding the reason for restraint

## 2023-09-15 NOTE — TELEPHONE ENCOUNTER
Left a vm for Alea Stefani concerning her mother, just verifying she didn't need anything/ updates. If she calls back please send her to me.      Thanks, Arie Gaucher

## 2023-09-15 NOTE — PLAN OF CARE
Problem: Safety - Adult  Goal: Free from fall injury  Outcome: Progressing     Problem: Safety - Medical Restraint  Goal: Remains free of injury from restraints (Restraint for Interference with Medical Device)  Description: INTERVENTIONS:  1. Determine that other, less restrictive measures have been tried or would not be effective before applying the restraint  2. Evaluate the patient's condition at the time of restraint application  3. Inform patient/family regarding the reason for restraint  4.  Q2H: Monitor safety, psychosocial status, comfort, nutrition and hydration  Outcome: Progressing  Flowsheets (Taken 9/14/2023 1900 by Chinyere Dang RN)  Remains free of injury from restraints (restraint for interference with medical device):   Determine that other, less restrictive measures have been tried or would not be effective before applying the restraint   Evaluate the patient's condition at the time of restraint application   Inform patient/family regarding the reason for restraint

## 2023-09-15 NOTE — TELEPHONE ENCOUNTER
Raymundo Prasad returned my call. \" Rachel Roblero is scheduled for ETT  and Echo. My mom is doing well , just not happy she is at the hospital.\" Raymundo Prasad will call me back if any changes occur with Rachel Roblero.        Thanks, Kofi Fried

## 2023-09-16 ENCOUNTER — APPOINTMENT (OUTPATIENT)
Dept: CT IMAGING | Age: 83
DRG: 163 | End: 2023-09-16
Payer: MEDICARE

## 2023-09-16 LAB
ANION GAP SERPL CALCULATED.3IONS-SCNC: 8 MMOL/L (ref 3–16)
ANTI-XA UNFRAC HEPARIN: 0.43 IU/ML (ref 0.3–0.7)
ANTI-XA UNFRAC HEPARIN: <0.1 IU/ML (ref 0.3–0.7)
ANTI-XA UNFRAC HEPARIN: >1.1 IU/ML (ref 0.3–0.7)
APTT BLD: 32.3 SEC (ref 22.7–35.9)
BUN SERPL-MCNC: 17 MG/DL (ref 7–20)
CALCIUM SERPL-MCNC: 8.3 MG/DL (ref 8.3–10.6)
CHLORIDE SERPL-SCNC: 103 MMOL/L (ref 99–110)
CO2 SERPL-SCNC: 25 MMOL/L (ref 21–32)
CREAT SERPL-MCNC: 0.7 MG/DL (ref 0.6–1.2)
DEPRECATED RDW RBC AUTO: 13.4 % (ref 12.4–15.4)
DEPRECATED RDW RBC AUTO: 13.8 % (ref 12.4–15.4)
GFR SERPLBLD CREATININE-BSD FMLA CKD-EPI: >60 ML/MIN/{1.73_M2}
GLUCOSE SERPL-MCNC: 98 MG/DL (ref 70–99)
HCT VFR BLD AUTO: 26.6 % (ref 36–48)
HCT VFR BLD AUTO: 29.1 % (ref 36–48)
HGB BLD-MCNC: 9 G/DL (ref 12–16)
HGB BLD-MCNC: 9.6 G/DL (ref 12–16)
INR PPP: 1.1 (ref 0.84–1.16)
MCH RBC QN AUTO: 32.5 PG (ref 26–34)
MCH RBC QN AUTO: 32.5 PG (ref 26–34)
MCHC RBC AUTO-ENTMCNC: 33.1 G/DL (ref 31–36)
MCHC RBC AUTO-ENTMCNC: 33.7 G/DL (ref 31–36)
MCV RBC AUTO: 96.6 FL (ref 80–100)
MCV RBC AUTO: 98.1 FL (ref 80–100)
PLATELET # BLD AUTO: 153 K/UL (ref 135–450)
PLATELET # BLD AUTO: 166 K/UL (ref 135–450)
PMV BLD AUTO: 7.6 FL (ref 5–10.5)
PMV BLD AUTO: 8.4 FL (ref 5–10.5)
POTASSIUM SERPL-SCNC: 3.9 MMOL/L (ref 3.5–5.1)
PROTHROMBIN TIME: 14.2 SEC (ref 11.5–14.8)
RBC # BLD AUTO: 2.76 M/UL (ref 4–5.2)
RBC # BLD AUTO: 2.97 M/UL (ref 4–5.2)
SODIUM SERPL-SCNC: 136 MMOL/L (ref 136–145)
WBC # BLD AUTO: 11.5 K/UL (ref 4–11)
WBC # BLD AUTO: 13.2 K/UL (ref 4–11)

## 2023-09-16 PROCEDURE — 85027 COMPLETE CBC AUTOMATED: CPT

## 2023-09-16 PROCEDURE — 6360000002 HC RX W HCPCS: Performed by: INTERNAL MEDICINE

## 2023-09-16 PROCEDURE — 87040 BLOOD CULTURE FOR BACTERIA: CPT

## 2023-09-16 PROCEDURE — 85610 PROTHROMBIN TIME: CPT

## 2023-09-16 PROCEDURE — 2580000003 HC RX 258: Performed by: INTERNAL MEDICINE

## 2023-09-16 PROCEDURE — 97530 THERAPEUTIC ACTIVITIES: CPT

## 2023-09-16 PROCEDURE — 85730 THROMBOPLASTIN TIME PARTIAL: CPT

## 2023-09-16 PROCEDURE — 99232 SBSQ HOSP IP/OBS MODERATE 35: CPT | Performed by: STUDENT IN AN ORGANIZED HEALTH CARE EDUCATION/TRAINING PROGRAM

## 2023-09-16 PROCEDURE — 85520 HEPARIN ASSAY: CPT

## 2023-09-16 PROCEDURE — 2580000003 HC RX 258: Performed by: SURGERY

## 2023-09-16 PROCEDURE — 97162 PT EVAL MOD COMPLEX 30 MIN: CPT

## 2023-09-16 PROCEDURE — 2580000003 HC RX 258: Performed by: NURSE PRACTITIONER

## 2023-09-16 PROCEDURE — 2000000000 HC ICU R&B

## 2023-09-16 PROCEDURE — 71275 CT ANGIOGRAPHY CHEST: CPT

## 2023-09-16 PROCEDURE — 97165 OT EVAL LOW COMPLEX 30 MIN: CPT

## 2023-09-16 PROCEDURE — 92526 ORAL FUNCTION THERAPY: CPT

## 2023-09-16 PROCEDURE — 6360000004 HC RX CONTRAST MEDICATION: Performed by: INTERNAL MEDICINE

## 2023-09-16 PROCEDURE — 6360000002 HC RX W HCPCS: Performed by: STUDENT IN AN ORGANIZED HEALTH CARE EDUCATION/TRAINING PROGRAM

## 2023-09-16 PROCEDURE — 6360000002 HC RX W HCPCS: Performed by: NURSE PRACTITIONER

## 2023-09-16 PROCEDURE — 80048 BASIC METABOLIC PNL TOTAL CA: CPT

## 2023-09-16 PROCEDURE — 99233 SBSQ HOSP IP/OBS HIGH 50: CPT | Performed by: INTERNAL MEDICINE

## 2023-09-16 PROCEDURE — 6370000000 HC RX 637 (ALT 250 FOR IP): Performed by: NURSE PRACTITIONER

## 2023-09-16 PROCEDURE — 2580000003 HC RX 258: Performed by: STUDENT IN AN ORGANIZED HEALTH CARE EDUCATION/TRAINING PROGRAM

## 2023-09-16 RX ORDER — HEPARIN SODIUM 10000 [USP'U]/100ML
5-30 INJECTION, SOLUTION INTRAVENOUS CONTINUOUS
Status: DISCONTINUED | OUTPATIENT
Start: 2023-09-16 | End: 2023-09-17

## 2023-09-16 RX ORDER — HEPARIN SODIUM 1000 [USP'U]/ML
80 INJECTION, SOLUTION INTRAVENOUS; SUBCUTANEOUS ONCE
Status: DISCONTINUED | OUTPATIENT
Start: 2023-09-16 | End: 2023-09-16

## 2023-09-16 RX ORDER — LANOLIN ALCOHOL/MO/W.PET/CERES
3 CREAM (GRAM) TOPICAL NIGHTLY PRN
Status: DISCONTINUED | OUTPATIENT
Start: 2023-09-16 | End: 2023-09-19 | Stop reason: HOSPADM

## 2023-09-16 RX ORDER — POLYVINYL ALCOHOL 14 MG/ML
1 SOLUTION/ DROPS OPHTHALMIC 4 TIMES DAILY PRN
Status: DISCONTINUED | OUTPATIENT
Start: 2023-09-16 | End: 2023-09-19 | Stop reason: HOSPADM

## 2023-09-16 RX ORDER — HEPARIN SODIUM 1000 [USP'U]/ML
80 INJECTION, SOLUTION INTRAVENOUS; SUBCUTANEOUS PRN
Status: DISCONTINUED | OUTPATIENT
Start: 2023-09-16 | End: 2023-09-18 | Stop reason: ALTCHOICE

## 2023-09-16 RX ORDER — HEPARIN SODIUM 1000 [USP'U]/ML
40 INJECTION, SOLUTION INTRAVENOUS; SUBCUTANEOUS PRN
Status: DISCONTINUED | OUTPATIENT
Start: 2023-09-16 | End: 2023-09-18 | Stop reason: ALTCHOICE

## 2023-09-16 RX ADMIN — MELATONIN TAB 3 MG 3 MG: 3 TAB at 22:05

## 2023-09-16 RX ADMIN — SODIUM CHLORIDE: 9 INJECTION, SOLUTION INTRAVENOUS at 23:10

## 2023-09-16 RX ADMIN — Medication 10 ML: at 20:49

## 2023-09-16 RX ADMIN — AMPICILLIN AND SULBACTAM 3000 MG: 2; 1 INJECTION, POWDER, FOR SOLUTION INTRAVENOUS at 17:19

## 2023-09-16 RX ADMIN — Medication 10 ML: at 08:25

## 2023-09-16 RX ADMIN — AZITHROMYCIN MONOHYDRATE 500 MG: 500 INJECTION, POWDER, LYOPHILIZED, FOR SOLUTION INTRAVENOUS at 00:59

## 2023-09-16 RX ADMIN — HEPARIN SODIUM 18 UNITS/KG/HR: 10000 INJECTION, SOLUTION INTRAVENOUS at 13:56

## 2023-09-16 RX ADMIN — AMPICILLIN AND SULBACTAM 3000 MG: 2; 1 INJECTION, POWDER, FOR SOLUTION INTRAVENOUS at 13:50

## 2023-09-16 RX ADMIN — SODIUM CHLORIDE, PRESERVATIVE FREE 10 ML: 5 INJECTION INTRAVENOUS at 08:25

## 2023-09-16 RX ADMIN — IOPAMIDOL 150 ML: 755 INJECTION, SOLUTION INTRAVENOUS at 12:25

## 2023-09-16 RX ADMIN — AMPICILLIN AND SULBACTAM 3000 MG: 2; 1 INJECTION, POWDER, FOR SOLUTION INTRAVENOUS at 23:04

## 2023-09-16 RX ADMIN — CEFTRIAXONE SODIUM 1000 MG: 1 INJECTION, POWDER, FOR SOLUTION INTRAMUSCULAR; INTRAVENOUS at 01:44

## 2023-09-16 RX ADMIN — POLYVINYL ALCOHOL 1 DROP: 14 SOLUTION/ DROPS OPHTHALMIC at 19:47

## 2023-09-16 RX ADMIN — SODIUM CHLORIDE: 9 INJECTION, SOLUTION INTRAVENOUS at 00:55

## 2023-09-16 ASSESSMENT — PAIN SCALES - GENERAL
PAINLEVEL_OUTOF10: 0
PAINLEVEL_OUTOF10: 0

## 2023-09-16 NOTE — PLAN OF CARE
Problem: Safety - Adult  Goal: Free from fall injury  9/16/2023 0744 by JAYLEN Moran - CNP  Outcome: Progressing  9/15/2023 2142 by Kayden Connolly RN  Outcome: Progressing     Problem: Skin/Tissue Integrity  Goal: Absence of new skin breakdown  Description: 1. Monitor for areas of redness and/or skin breakdown  2. Assess vascular access sites hourly  3. Every 4-6 hours minimum:  Change oxygen saturation probe site  4. Every 4-6 hours:  If on nasal continuous positive airway pressure, respiratory therapy assess nares and determine need for appliance change or resting period.   Outcome: Progressing

## 2023-09-17 LAB
ALBUMIN SERPL-MCNC: 2.6 G/DL (ref 3.4–5)
ALP SERPL-CCNC: 136 U/L (ref 40–129)
ALT SERPL-CCNC: 157 U/L (ref 10–40)
ANTI-XA UNFRAC HEPARIN: 0.18 IU/ML (ref 0.3–0.7)
ANTI-XA UNFRAC HEPARIN: <0.1 IU/ML (ref 0.3–0.7)
ANTI-XA UNFRAC HEPARIN: >1.1 IU/ML (ref 0.3–0.7)
AST SERPL-CCNC: 61 U/L (ref 15–37)
BACTERIA BLD CULT ORG #2: ABNORMAL
BACTERIA BLD CULT ORG #2: ABNORMAL
BASOPHILS # BLD: 0.1 K/UL (ref 0–0.2)
BASOPHILS NFR BLD: 1 %
BILIRUB DIRECT SERPL-MCNC: <0.2 MG/DL (ref 0–0.3)
BILIRUB INDIRECT SERPL-MCNC: ABNORMAL MG/DL (ref 0–1)
BILIRUB SERPL-MCNC: 0.4 MG/DL (ref 0–1)
DEPRECATED RDW RBC AUTO: 13.7 % (ref 12.4–15.4)
EOSINOPHIL # BLD: 0.1 K/UL (ref 0–0.6)
EOSINOPHIL NFR BLD: 1 %
HCT VFR BLD AUTO: 29.6 % (ref 36–48)
HGB BLD-MCNC: 10 G/DL (ref 12–16)
LYMPHOCYTES # BLD: 0.5 K/UL (ref 1–5.1)
LYMPHOCYTES NFR BLD: 5 %
MCH RBC QN AUTO: 32.5 PG (ref 26–34)
MCHC RBC AUTO-ENTMCNC: 33.8 G/DL (ref 31–36)
MCV RBC AUTO: 96.2 FL (ref 80–100)
MONOCYTES # BLD: 0.3 K/UL (ref 0–1.3)
MONOCYTES NFR BLD: 3 %
NEUTROPHILS # BLD: 8.8 K/UL (ref 1.7–7.7)
NEUTROPHILS NFR BLD: 90 %
ORGANISM: ABNORMAL
ORGANISM: ABNORMAL
PLATELET # BLD AUTO: 188 K/UL (ref 135–450)
PLATELET BLD QL SMEAR: ADEQUATE
PMV BLD AUTO: 8.6 FL (ref 5–10.5)
PROT SERPL-MCNC: 5.1 G/DL (ref 6.4–8.2)
RBC # BLD AUTO: 3.08 M/UL (ref 4–5.2)
RBC MORPH BLD: NORMAL
SLIDE REVIEW: ABNORMAL
WBC # BLD AUTO: 9.8 K/UL (ref 4–11)

## 2023-09-17 PROCEDURE — 80076 HEPATIC FUNCTION PANEL: CPT

## 2023-09-17 PROCEDURE — 6360000002 HC RX W HCPCS: Performed by: STUDENT IN AN ORGANIZED HEALTH CARE EDUCATION/TRAINING PROGRAM

## 2023-09-17 PROCEDURE — 99232 SBSQ HOSP IP/OBS MODERATE 35: CPT | Performed by: INTERNAL MEDICINE

## 2023-09-17 PROCEDURE — 6370000000 HC RX 637 (ALT 250 FOR IP): Performed by: INTERNAL MEDICINE

## 2023-09-17 PROCEDURE — 2580000003 HC RX 258: Performed by: SURGERY

## 2023-09-17 PROCEDURE — 2000000000 HC ICU R&B

## 2023-09-17 PROCEDURE — 6360000002 HC RX W HCPCS: Performed by: INTERNAL MEDICINE

## 2023-09-17 PROCEDURE — 85520 HEPARIN ASSAY: CPT

## 2023-09-17 PROCEDURE — 2580000003 HC RX 258: Performed by: STUDENT IN AN ORGANIZED HEALTH CARE EDUCATION/TRAINING PROGRAM

## 2023-09-17 PROCEDURE — 6370000000 HC RX 637 (ALT 250 FOR IP): Performed by: NURSE PRACTITIONER

## 2023-09-17 PROCEDURE — 85025 COMPLETE CBC W/AUTO DIFF WBC: CPT

## 2023-09-17 RX ORDER — BENZONATATE 100 MG/1
100 CAPSULE ORAL 3 TIMES DAILY PRN
Status: DISCONTINUED | OUTPATIENT
Start: 2023-09-17 | End: 2023-09-19 | Stop reason: HOSPADM

## 2023-09-17 RX ADMIN — SODIUM CHLORIDE, PRESERVATIVE FREE 10 ML: 5 INJECTION INTRAVENOUS at 09:24

## 2023-09-17 RX ADMIN — HEPARIN SODIUM 15 UNITS/KG/HR: 10000 INJECTION, SOLUTION INTRAVENOUS at 00:54

## 2023-09-17 RX ADMIN — RIVAROXABAN 15 MG: 15 TABLET, FILM COATED ORAL at 13:23

## 2023-09-17 RX ADMIN — BENZONATATE 100 MG: 100 CAPSULE ORAL at 17:00

## 2023-09-17 RX ADMIN — AMPICILLIN AND SULBACTAM 3000 MG: 2; 1 INJECTION, POWDER, FOR SOLUTION INTRAVENOUS at 16:59

## 2023-09-17 RX ADMIN — AMPICILLIN AND SULBACTAM 3000 MG: 2; 1 INJECTION, POWDER, FOR SOLUTION INTRAVENOUS at 22:39

## 2023-09-17 RX ADMIN — HEPARIN SODIUM 12 UNITS/KG/HR: 10000 INJECTION, SOLUTION INTRAVENOUS at 06:42

## 2023-09-17 RX ADMIN — HEPARIN SODIUM 2740 UNITS: 1000 INJECTION INTRAVENOUS; SUBCUTANEOUS at 09:21

## 2023-09-17 RX ADMIN — POLYVINYL ALCOHOL 1 DROP: 14 SOLUTION/ DROPS OPHTHALMIC at 05:26

## 2023-09-17 RX ADMIN — AMPICILLIN AND SULBACTAM 3000 MG: 2; 1 INJECTION, POWDER, FOR SOLUTION INTRAVENOUS at 05:31

## 2023-09-17 RX ADMIN — BENZONATATE 100 MG: 100 CAPSULE ORAL at 22:41

## 2023-09-17 RX ADMIN — POLYVINYL ALCOHOL 1 DROP: 14 SOLUTION/ DROPS OPHTHALMIC at 10:07

## 2023-09-17 RX ADMIN — BENZONATATE 100 MG: 100 CAPSULE ORAL at 10:06

## 2023-09-17 RX ADMIN — AMPICILLIN AND SULBACTAM 3000 MG: 2; 1 INJECTION, POWDER, FOR SOLUTION INTRAVENOUS at 11:22

## 2023-09-17 RX ADMIN — MELATONIN TAB 3 MG 3 MG: 3 TAB at 23:36

## 2023-09-17 RX ADMIN — RIVAROXABAN 15 MG: 15 TABLET, FILM COATED ORAL at 16:57

## 2023-09-17 ASSESSMENT — PAIN SCALES - GENERAL: PAINLEVEL_OUTOF10: 0

## 2023-09-18 PROBLEM — I35.0 NONRHEUMATIC AORTIC VALVE STENOSIS: Status: ACTIVE | Noted: 2023-09-18

## 2023-09-18 PROBLEM — I26.94 MULTIPLE SUBSEGMENTAL PULMONARY EMBOLI WITHOUT ACUTE COR PULMONALE (HCC): Status: ACTIVE | Noted: 2023-09-14

## 2023-09-18 LAB
ANION GAP SERPL CALCULATED.3IONS-SCNC: 6 MMOL/L (ref 3–16)
BACTERIA BLD CULT: NORMAL
BUN SERPL-MCNC: 11 MG/DL (ref 7–20)
CALCIUM SERPL-MCNC: 8.8 MG/DL (ref 8.3–10.6)
CHLORIDE SERPL-SCNC: 107 MMOL/L (ref 99–110)
CO2 SERPL-SCNC: 26 MMOL/L (ref 21–32)
CREAT SERPL-MCNC: 0.7 MG/DL (ref 0.6–1.2)
DEPRECATED RDW RBC AUTO: 13.5 % (ref 12.4–15.4)
GFR SERPLBLD CREATININE-BSD FMLA CKD-EPI: >60 ML/MIN/{1.73_M2}
GLUCOSE SERPL-MCNC: 106 MG/DL (ref 70–99)
HCT VFR BLD AUTO: 29.1 % (ref 36–48)
HGB BLD-MCNC: 10 G/DL (ref 12–16)
MCH RBC QN AUTO: 33.4 PG (ref 26–34)
MCHC RBC AUTO-ENTMCNC: 34.4 G/DL (ref 31–36)
MCV RBC AUTO: 97 FL (ref 80–100)
PLATELET # BLD AUTO: 216 K/UL (ref 135–450)
PMV BLD AUTO: 7.8 FL (ref 5–10.5)
POTASSIUM SERPL-SCNC: 4.3 MMOL/L (ref 3.5–5.1)
RBC # BLD AUTO: 3 M/UL (ref 4–5.2)
SODIUM SERPL-SCNC: 139 MMOL/L (ref 136–145)
WBC # BLD AUTO: 10.4 K/UL (ref 4–11)

## 2023-09-18 PROCEDURE — 2000000000 HC ICU R&B

## 2023-09-18 PROCEDURE — 2580000003 HC RX 258: Performed by: SURGERY

## 2023-09-18 PROCEDURE — 99233 SBSQ HOSP IP/OBS HIGH 50: CPT | Performed by: INTERNAL MEDICINE

## 2023-09-18 PROCEDURE — 6370000000 HC RX 637 (ALT 250 FOR IP): Performed by: INTERNAL MEDICINE

## 2023-09-18 PROCEDURE — 6360000002 HC RX W HCPCS: Performed by: STUDENT IN AN ORGANIZED HEALTH CARE EDUCATION/TRAINING PROGRAM

## 2023-09-18 PROCEDURE — 80048 BASIC METABOLIC PNL TOTAL CA: CPT

## 2023-09-18 PROCEDURE — 97530 THERAPEUTIC ACTIVITIES: CPT

## 2023-09-18 PROCEDURE — 92526 ORAL FUNCTION THERAPY: CPT

## 2023-09-18 PROCEDURE — 6370000000 HC RX 637 (ALT 250 FOR IP): Performed by: STUDENT IN AN ORGANIZED HEALTH CARE EDUCATION/TRAINING PROGRAM

## 2023-09-18 PROCEDURE — 2580000003 HC RX 258: Performed by: INTERNAL MEDICINE

## 2023-09-18 PROCEDURE — 6370000000 HC RX 637 (ALT 250 FOR IP): Performed by: NURSE PRACTITIONER

## 2023-09-18 PROCEDURE — 97535 SELF CARE MNGMENT TRAINING: CPT

## 2023-09-18 PROCEDURE — 97116 GAIT TRAINING THERAPY: CPT

## 2023-09-18 PROCEDURE — 85027 COMPLETE CBC AUTOMATED: CPT

## 2023-09-18 PROCEDURE — 2580000003 HC RX 258: Performed by: STUDENT IN AN ORGANIZED HEALTH CARE EDUCATION/TRAINING PROGRAM

## 2023-09-18 RX ORDER — HYDROCHLOROTHIAZIDE 25 MG/1
25 TABLET ORAL EVERY MORNING
Status: DISCONTINUED | OUTPATIENT
Start: 2023-09-18 | End: 2023-09-19 | Stop reason: HOSPADM

## 2023-09-18 RX ORDER — PRAVASTATIN SODIUM 40 MG
20 TABLET ORAL NIGHTLY
Status: DISCONTINUED | OUTPATIENT
Start: 2023-09-18 | End: 2023-09-19 | Stop reason: HOSPADM

## 2023-09-18 RX ORDER — ASPIRIN 81 MG/1
81 TABLET ORAL DAILY
Status: DISCONTINUED | OUTPATIENT
Start: 2023-09-18 | End: 2023-09-19 | Stop reason: HOSPADM

## 2023-09-18 RX ORDER — AMOXICILLIN AND CLAVULANATE POTASSIUM 875; 125 MG/1; MG/1
1 TABLET, FILM COATED ORAL EVERY 12 HOURS SCHEDULED
Status: DISCONTINUED | OUTPATIENT
Start: 2023-09-18 | End: 2023-09-19 | Stop reason: HOSPADM

## 2023-09-18 RX ORDER — LISINOPRIL 20 MG/1
20 TABLET ORAL 2 TIMES DAILY
Status: DISCONTINUED | OUTPATIENT
Start: 2023-09-18 | End: 2023-09-19 | Stop reason: HOSPADM

## 2023-09-18 RX ADMIN — LISINOPRIL 20 MG: 20 TABLET ORAL at 20:53

## 2023-09-18 RX ADMIN — MELATONIN TAB 3 MG 3 MG: 3 TAB at 23:10

## 2023-09-18 RX ADMIN — HYDROCHLOROTHIAZIDE 25 MG: 25 TABLET ORAL at 09:38

## 2023-09-18 RX ADMIN — Medication 10 ML: at 11:35

## 2023-09-18 RX ADMIN — PRAVASTATIN SODIUM 20 MG: 40 TABLET ORAL at 20:53

## 2023-09-18 RX ADMIN — POLYVINYL ALCOHOL 1 DROP: 14 SOLUTION/ DROPS OPHTHALMIC at 16:17

## 2023-09-18 RX ADMIN — AMOXICILLIN AND CLAVULANATE POTASSIUM 1 TABLET: 875; 125 TABLET, FILM COATED ORAL at 20:53

## 2023-09-18 RX ADMIN — SODIUM CHLORIDE, PRESERVATIVE FREE 10 ML: 5 INJECTION INTRAVENOUS at 11:35

## 2023-09-18 RX ADMIN — ASPIRIN 81 MG: 81 TABLET, COATED ORAL at 09:38

## 2023-09-18 RX ADMIN — RIVAROXABAN 15 MG: 15 TABLET, FILM COATED ORAL at 09:38

## 2023-09-18 RX ADMIN — AMPICILLIN AND SULBACTAM 3000 MG: 2; 1 INJECTION, POWDER, FOR SOLUTION INTRAVENOUS at 11:33

## 2023-09-18 RX ADMIN — LISINOPRIL 20 MG: 20 TABLET ORAL at 09:39

## 2023-09-18 RX ADMIN — RIVAROXABAN 15 MG: 15 TABLET, FILM COATED ORAL at 17:52

## 2023-09-18 RX ADMIN — AMPICILLIN AND SULBACTAM 3000 MG: 2; 1 INJECTION, POWDER, FOR SOLUTION INTRAVENOUS at 05:19

## 2023-09-19 ENCOUNTER — TELEPHONE (OUTPATIENT)
Dept: INTERNAL MEDICINE CLINIC | Age: 83
End: 2023-09-19

## 2023-09-19 ENCOUNTER — TELEPHONE (OUTPATIENT)
Dept: CARDIOLOGY CLINIC | Age: 83
End: 2023-09-19

## 2023-09-19 VITALS
BODY MASS INDEX: 28.74 KG/M2 | DIASTOLIC BLOOD PRESSURE: 78 MMHG | TEMPERATURE: 98.9 F | OXYGEN SATURATION: 94 % | WEIGHT: 152.12 LBS | SYSTOLIC BLOOD PRESSURE: 147 MMHG | RESPIRATION RATE: 18 BRPM | HEART RATE: 107 BPM

## 2023-09-19 DIAGNOSIS — I35.0 AORTIC VALVE STENOSIS, ETIOLOGY OF CARDIAC VALVE DISEASE UNSPECIFIED: Primary | ICD-10-CM

## 2023-09-19 LAB
ANION GAP SERPL CALCULATED.3IONS-SCNC: 9 MMOL/L (ref 3–16)
BUN SERPL-MCNC: 8 MG/DL (ref 7–20)
CALCIUM SERPL-MCNC: 8.3 MG/DL (ref 8.3–10.6)
CHLORIDE SERPL-SCNC: 105 MMOL/L (ref 99–110)
CO2 SERPL-SCNC: 24 MMOL/L (ref 21–32)
CREAT SERPL-MCNC: 0.6 MG/DL (ref 0.6–1.2)
GFR SERPLBLD CREATININE-BSD FMLA CKD-EPI: >60 ML/MIN/{1.73_M2}
GLUCOSE SERPL-MCNC: 101 MG/DL (ref 70–99)
POTASSIUM SERPL-SCNC: 4.4 MMOL/L (ref 3.5–5.1)
SODIUM SERPL-SCNC: 138 MMOL/L (ref 136–145)

## 2023-09-19 PROCEDURE — 6370000000 HC RX 637 (ALT 250 FOR IP): Performed by: INTERNAL MEDICINE

## 2023-09-19 PROCEDURE — 80048 BASIC METABOLIC PNL TOTAL CA: CPT

## 2023-09-19 PROCEDURE — 6370000000 HC RX 637 (ALT 250 FOR IP): Performed by: STUDENT IN AN ORGANIZED HEALTH CARE EDUCATION/TRAINING PROGRAM

## 2023-09-19 PROCEDURE — 99232 SBSQ HOSP IP/OBS MODERATE 35: CPT | Performed by: INTERNAL MEDICINE

## 2023-09-19 PROCEDURE — 97116 GAIT TRAINING THERAPY: CPT

## 2023-09-19 PROCEDURE — 2580000003 HC RX 258: Performed by: SURGERY

## 2023-09-19 PROCEDURE — 92526 ORAL FUNCTION THERAPY: CPT

## 2023-09-19 PROCEDURE — 2580000003 HC RX 258: Performed by: INTERNAL MEDICINE

## 2023-09-19 RX ORDER — AMOXICILLIN AND CLAVULANATE POTASSIUM 875; 125 MG/1; MG/1
1 TABLET, FILM COATED ORAL EVERY 12 HOURS SCHEDULED
Qty: 5 TABLET | Refills: 0 | Status: SHIPPED | OUTPATIENT
Start: 2023-09-19 | End: 2023-09-22

## 2023-09-19 RX ORDER — BENZONATATE 100 MG/1
100 CAPSULE ORAL 3 TIMES DAILY PRN
Qty: 14 CAPSULE | Refills: 0 | Status: SHIPPED | OUTPATIENT
Start: 2023-09-19 | End: 2023-09-26

## 2023-09-19 RX ADMIN — Medication 10 ML: at 09:09

## 2023-09-19 RX ADMIN — LISINOPRIL 20 MG: 20 TABLET ORAL at 09:07

## 2023-09-19 RX ADMIN — SODIUM CHLORIDE, PRESERVATIVE FREE 10 ML: 5 INJECTION INTRAVENOUS at 09:08

## 2023-09-19 RX ADMIN — AMOXICILLIN AND CLAVULANATE POTASSIUM 1 TABLET: 875; 125 TABLET, FILM COATED ORAL at 09:07

## 2023-09-19 RX ADMIN — HYDROCHLOROTHIAZIDE 25 MG: 25 TABLET ORAL at 09:07

## 2023-09-19 RX ADMIN — RIVAROXABAN 15 MG: 15 TABLET, FILM COATED ORAL at 09:07

## 2023-09-19 RX ADMIN — ASPIRIN 81 MG: 81 TABLET, COATED ORAL at 09:07

## 2023-09-19 NOTE — CARE COORDINATION
CM faxed hc orders and chelly to [facility] at [phone]. CM spoke to Ashley Finley with [facility] [phone] and aware pt is discharging today and orders faxed. Patient discharged 9/19/2023 to home with [facility] services. All discharge needs met per case management.       Daysi Leyva RN, BSN  [phone]
Per nursing possible discharge today. PS message sent to MD stating pt will need hc orders on d/c for RN/PT/OT and chelly completed. CJ left vm for Se Joel with [facility] [phone] about the above.        Will Leos, RN, BSN  [phone]
[facility] received a referral to this patient for a rolling walker and a ttb. Rolling walker has been delivered to the patient's room. Made pt aware that insurance does not cover tub transfer benches. They were already searching online and plan to purchase. Thank you for the referral.  Electronically signed by Sandra Herrera on 9/19/2023 at 2:38 PM  Cell ph# [phone]    NOTE: After 5:00 pm, Weekends, Holidays: Call [facility] at [phone] to coordinate delivery of home medical equipment.
dialogue that supports the patient's individualized plan of care/goals and shares the quality data associated with the providers was provided to:     Patient Representative Name:       The Patient and/or Patient Representative Agree with the Discharge Plan?       Mellissa aJmes  Case Management Department  Ph: *** Fax: ***

## 2023-09-19 NOTE — DISCHARGE INSTR - COC
Continuity of Care Form    Patient Name: Thad Kelsey   :  1940  MRN:  [id number]    Admit date:  2023  Discharge date:  ***    Code Status Order: Full Code   Advance Directives:     Admitting Physician:  Kelsie Keen MD  PCP: Lynne Su MD    Discharging Nurse: [facility] Unit/Room#: WIY-3291/8865-55  Discharging Unit Phone Number: ***    Emergency Contact:   Extended Emergency Contact Information  Primary Emergency Contact: Darius Gonzalez [facility] [place] [address] [place] Phone: [phone]  Relation: Brother/Sister  Secondary Emergency Contact: [facility] [place]  Address: [address] [place] [address], [address] [place] [address] of [address] [place] Phone: [phone]  Mobile Phone: [phone]  Relation: Child    Past Surgical History:  Past Surgical History:   Procedure Laterality Date    EYE SURGERY Bilateral     cataract    MASTECTOMY Right 2023    RIGHT BREAST TOTAL MASTECTOMY, RIGHT SENTINEL LYMPH NODE BIOPSY, TECHNETIUM NINETY-NINE AND INJECTABLE BLUE DYE IN OPERATING ROOM performed by Mary Ibarra MD at [address] [place] [address] NEEDLE ADDITIONAL RIGHT Right 2023    US BREAST BIOPSY NEEDLE ADDITIONAL RIGHT 2023 [facility] ULTRASOUND    US BREAST BIOPSY W LOC DEVICE 1ST LESION RIGHT Right 2023    US BREAST BIOPSY W LOC DEVICE 1ST LESION RIGHT 2023 [facility] ULTRASOUND       Immunization History:   Immunization History   Administered Date(s) Administered    DTaP 2008    Pneumococcal, PPSV23, PNEUMOVAX 23, (age 2y+), SC/IM, 0.5mL 2014       Active Problems:  Patient Active Problem List   Diagnosis Code    Pure hypercholesterolemia E78.00    Essential hypertension I10    Encounter for subsequent annual wellness visit (AWV) in Medicare patient Z00.00    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

## 2023-09-19 NOTE — PLAN OF CARE
Problem: Safety - Adult  Goal: Free from fall injury  Outcome: Completed     Problem: Skin/Tissue Integrity  Goal: Absence of new skin breakdown  Description: 1. Monitor for areas of redness and/or skin breakdown  2. Assess vascular access sites hourly  3. Every 4-6 hours minimum:  Change oxygen saturation probe site  4. Every 4-6 hours:  If on nasal continuous positive airway pressure, respiratory therapy assess nares and determine need for appliance change or resting period.   Outcome: Completed

## 2023-09-19 NOTE — TELEPHONE ENCOUNTER
----- Message from Samra Bond RN sent at 9/19/2023 12:02 PM EDT -----  Regarding: ERINN scheduled  Please schedule ERINN outpatient with Dr. Ab Cheung in 2 weeks at [facility]. Needs to be on a Monday or Tuesday that there are Watchman/TAVR.

## 2023-09-19 NOTE — DISCHARGE SUMMARY
Hospital Medicine Discharge Summary    Patient: Thad Kelsey     Gender: female  : 1940   Age: 79 y.o. MRN: [id number]    Admitting Physician: Kelsie Keen MD  Discharge Physician: Araceli Olivares MD     Code Status: Full Code     Admit Date: 2023   Discharge Date:   2023    Disposition:  Home    Discharge Diagnoses: Active Hospital Problems    Diagnosis Date Noted    Nonrheumatic aortic valve stenosis [I35.0] 2023    Transaminitis [R74.01] 09/15/2023    Elevated lactic acid level [R79.89] 09/15/2023    Elevated troponin [R77.8] 09/15/2023    Acute deep vein thrombosis (DVT) of left peroneal vein (HCC) [I82.452] 09/15/2023    Cardiac arrest due to respiratory disorder ([address]) [J98.9, I46.8] 2023    Multiple subsegmental pulmonary emboli without acute cor pulmonale ([address]) [I26.94] 2023    S/P right mastectomy [Z90.11] 2023    Acute respiratory failure with hypoxia ([address]) [J96.01] 2023    Cardiopulmonary arrest with successful resuscitation ([address]) [I46.9] 2023    Shock circulatory ([address]) [R57.9] 2023    Invasive ductal carcinoma of breast, stage 2, right ([address]) [C50.911] 2023       Follow-up appointments:  two weeks    Outpatient to do list: follow up with cardiology     Condition at Discharge:  [facility] Course:   Admitted for shortness of breath and hypoxia. Found to have bilateral PEs on CT PE.  DVT ultrasound positive for left peroneal vein thrombosis. Started on heparin drip. Converted to Xarelto on discharge. Patient had cardiac arrest with PEA arrest.  Found to have significant AV stenosis and calcifications. Cardiology consulted. Plan is to have further evaluation of aortic valve outpatient.       Bilateral PE with DVT of left peroneal vein  -CT PE showed moderate bilateral PEs  -s/p thrombectomy 2023  -DVT ultrasound showed left peroneal vein thrombosis  -Off heparin drip  -Continue Xarelto  -Daily

## 2023-09-19 NOTE — TELEPHONE ENCOUNTER
TCM call needed. Pt's daughter called in regards to her mother. She states that she had breast cancer and had a right mastectomy. After having this done, she ran into a lot of other health problems. Her mother is very anxious to go home and is wondering if there is something Dr. Mandy Alexandra could give her to ease her mind. She is also requesting a handicap placard. I did put her on the scheduled for tomorrow at 6 for a hospital f/u. Pt's daughter said to go ahead and schedule her for this. She is unsure if she'll be able to bring her but will let us know within the hour if anything changes.

## 2023-09-20 ENCOUNTER — OFFICE VISIT (OUTPATIENT)
Dept: INTERNAL MEDICINE CLINIC | Age: 83
End: 2023-09-20
Payer: MEDICARE

## 2023-09-20 VITALS
OXYGEN SATURATION: 97 % | DIASTOLIC BLOOD PRESSURE: 72 MMHG | BODY MASS INDEX: 26.51 KG/M2 | HEART RATE: 105 BPM | WEIGHT: 140.4 LBS | SYSTOLIC BLOOD PRESSURE: 102 MMHG | HEIGHT: 61 IN

## 2023-09-20 DIAGNOSIS — F41.1 GAD (GENERALIZED ANXIETY DISORDER): ICD-10-CM

## 2023-09-20 DIAGNOSIS — C50.911 INVASIVE DUCTAL CARCINOMA OF BREAST, STAGE 2, RIGHT (HCC): ICD-10-CM

## 2023-09-20 DIAGNOSIS — I46.9 CARDIOPULMONARY ARREST WITH SUCCESSFUL RESUSCITATION (HCC): ICD-10-CM

## 2023-09-20 DIAGNOSIS — Z90.11 S/P RIGHT MASTECTOMY: ICD-10-CM

## 2023-09-20 DIAGNOSIS — I26.94 MULTIPLE SUBSEGMENTAL PULMONARY EMBOLI WITHOUT ACUTE COR PULMONALE (HCC): ICD-10-CM

## 2023-09-20 DIAGNOSIS — I82.452 ACUTE DEEP VEIN THROMBOSIS (DVT) OF LEFT PERONEAL VEIN (HCC): ICD-10-CM

## 2023-09-20 DIAGNOSIS — Z09 HOSPITAL DISCHARGE FOLLOW-UP: Primary | ICD-10-CM

## 2023-09-20 PROBLEM — J98.9 CARDIAC ARREST DUE TO RESPIRATORY DISORDER (HCC): Status: RESOLVED | Noted: 2023-09-14 | Resolved: 2023-09-20

## 2023-09-20 PROBLEM — N63.10 MASS OF RIGHT BREAST: Status: RESOLVED | Noted: 2023-06-28 | Resolved: 2023-09-20

## 2023-09-20 PROBLEM — R74.01 TRANSAMINITIS: Status: RESOLVED | Noted: 2023-09-15 | Resolved: 2023-09-20

## 2023-09-20 PROBLEM — R92.8 ABNORMAL MAMMOGRAM OF RIGHT BREAST: Status: RESOLVED | Noted: 2023-06-28 | Resolved: 2023-09-20

## 2023-09-20 PROBLEM — I46.8 CARDIAC ARREST DUE TO RESPIRATORY DISORDER (HCC): Status: RESOLVED | Noted: 2023-09-14 | Resolved: 2023-09-20

## 2023-09-20 PROBLEM — R79.89 ELEVATED LACTIC ACID LEVEL: Status: RESOLVED | Noted: 2023-09-15 | Resolved: 2023-09-20

## 2023-09-20 PROBLEM — R79.89 ELEVATED TROPONIN: Status: RESOLVED | Noted: 2023-09-15 | Resolved: 2023-09-20

## 2023-09-20 PROBLEM — R77.8 ELEVATED TROPONIN: Status: RESOLVED | Noted: 2023-09-15 | Resolved: 2023-09-20

## 2023-09-20 PROBLEM — R57.9 SHOCK CIRCULATORY (HCC): Status: RESOLVED | Noted: 2023-09-14 | Resolved: 2023-09-20

## 2023-09-20 PROBLEM — J96.01 ACUTE RESPIRATORY FAILURE WITH HYPOXIA (HCC): Status: RESOLVED | Noted: 2023-09-14 | Resolved: 2023-09-20

## 2023-09-20 LAB
BACTERIA BLD CULT ORG #2: NORMAL
BACTERIA BLD CULT: NORMAL

## 2023-09-20 PROCEDURE — 1123F ACP DISCUSS/DSCN MKR DOCD: CPT | Performed by: INTERNAL MEDICINE

## 2023-09-20 PROCEDURE — 3078F DIAST BP <80 MM HG: CPT | Performed by: INTERNAL MEDICINE

## 2023-09-20 PROCEDURE — 99214 OFFICE O/P EST MOD 30 MIN: CPT | Performed by: INTERNAL MEDICINE

## 2023-09-20 PROCEDURE — 3074F SYST BP LT 130 MM HG: CPT | Performed by: INTERNAL MEDICINE

## 2023-09-20 RX ORDER — HYDROXYZINE HYDROCHLORIDE 25 MG/1
25 TABLET, FILM COATED ORAL EVERY 8 HOURS PRN
Qty: 30 TABLET | Refills: 0 | Status: SHIPPED | OUTPATIENT
Start: 2023-09-20 | End: 2023-09-30

## 2023-09-20 ASSESSMENT — ENCOUNTER SYMPTOMS
ABDOMINAL PAIN: 0
CHEST TIGHTNESS: 0
SORE THROAT: 0
NAUSEA: 0
COLOR CHANGE: 0
COUGH: 0
CONSTIPATION: 0
BACK PAIN: 0
SHORTNESS OF BREATH: 0
WHEEZING: 0
VOMITING: 0

## 2023-09-20 NOTE — ASSESSMENT & PLAN NOTE
hospital discharge summary, imaging and lab studies noted, patient is currently doing much better, today's exam is with stable vital signs, she is tolerating medications well, she is scheduled to follow-up with breast surgery tomorrow and then will follow-up with [facility] next week. She is aware to schedule ERINN as recommended by cardiology and awaiting for central scheduling to call her.   Advised to keep upcoming appointment the end of October and will complete flu vaccine then

## 2023-09-20 NOTE — ASSESSMENT & PLAN NOTE
symptoms discussed with patient and granddaughter, this is mostly situational due to recent health scare, she does not want a daily medication, she is aware we can avoid benzos and and for now we will try hydroxyzine 25 mg as needed, advised can take half the dose if excess sedation, will reevaluate again in 6 weeks as scheduled

## 2023-09-20 NOTE — ASSESSMENT & PLAN NOTE
patient is s/p mastectomy, she will follow-up with breast surgery, today still has the drain in place on the right side, with a minimum drainage, continue care and recommendation as per breast surgery

## 2023-09-20 NOTE — ASSESSMENT & PLAN NOTE
currently on anticoagulation, tolerating well, she is scheduled to follow-up with heme Onc tomorrow, so far tolerating anticoagulation therapy without any complications

## 2023-09-21 ENCOUNTER — OFFICE VISIT (OUTPATIENT)
Dept: SURGERY | Age: 83
End: 2023-09-21

## 2023-09-21 VITALS
DIASTOLIC BLOOD PRESSURE: 78 MMHG | WEIGHT: 140 LBS | HEART RATE: 78 BPM | SYSTOLIC BLOOD PRESSURE: 142 MMHG | BODY MASS INDEX: 26.43 KG/M2 | OXYGEN SATURATION: 98 % | RESPIRATION RATE: 18 BRPM | HEIGHT: 61 IN

## 2023-09-21 DIAGNOSIS — C50.911 PRIMARY BREAST MALIGNANCY, RIGHT (HCC): Primary | ICD-10-CM

## 2023-09-21 DIAGNOSIS — Z09 POSTOP CHECK: ICD-10-CM

## 2023-09-21 PROCEDURE — 99024 POSTOP FOLLOW-UP VISIT: CPT | Performed by: SURGERY

## 2023-09-21 NOTE — TELEPHONE ENCOUNTER
Wallace Valerio called back this afternoon and I offered her Monday 10/2 and she stated her daughter will be out of town until 10/3. Date of Procedure: Monday 10/9/23 @ [facility] with Dr. Onesimo Reddy     Time of arrival: 1:30 pm     Procedure time: 2:30 pm     Wallace Valerio is agreeable to date and time. Reviewed instructions and she verbalized understanding. Encouraged to call with any questions or concerns. Published on eyeOS, scheduled in Epic and e-mailed to cath lab.

## 2023-09-21 NOTE — PROGRESS NOTES
A12 - A13: Upper-outer quadrant. A14 - A15: Upper-inner quadrant. A16 - A17: Lower-outer quadrant. A18 - A19: Lower-inner quadrant. Note: The specimen was placed in formalin on 9/6/23 at 1400. Total   formalin exposure time is greater than 6 and less than 72 hours. Cold   ischemic time is 1 hour 19 minutes. B: Part B is received in formalin, labeled with the patient name   Samy MEDEL\" and additionally labeled \"right sentinel node #1. \"   The specimen consists of a 1.3 x 0.7 x 0.3 cm rubbery, tan-yellow, fatty   possible lymph node. The lymph node is trisected and submitted entirely   in cassette B1.     C: Part C is received in formalin, labeled with the patient name   Samy MEDEL\" and additionally labeled \"right sentinel node #2. \"   The specimen consists of a 1.0 x 0.6 x 0.4 cm pink-tan, rubbery, and   fatty possible lymph node. The possible lymph node is bisected and   submitted entirely in cassette C1.     D: Part D is received in formalin, labeled with the patient name   Samy MEDEL\" and additionally labeled \"right sentinel node #3. \"   The specimen consists of 3 purple-tan, rubbery, and fatty possible lymph   nodes. The lymph nodes have greatest dimensions ranging from 0.8 cm to   2.3 cm. The possible lymph nodes are sectioned and submitted entirely as follows:     D1: One possible lymph node, quadrisected. D2: One possible lymph node, bisected. D3: One possible lymph node, trisected. GITAN/BING     MICROSCOPIC DESCRIPTION: Microscopic examination performed.      CPT: [id number] X3   80785 X1   32974 X4     Case signed out at [facility],   [address] [place] [address]   Technical processing at [facility], [address] [place] [address]  Phone [phone]         Jolynn Reece MD   (Electronic Signature)   09/11/2023       Exam:  General: no acute distress  Breast: There is a well healing scar on the

## 2023-09-25 ENCOUNTER — TELEPHONE (OUTPATIENT)
Dept: INTERNAL MEDICINE CLINIC | Age: 83
End: 2023-09-25

## 2023-09-25 NOTE — TELEPHONE ENCOUNTER
Silvio Score from [facility] is wanting verbal for skilled nursing, PT, and OT.

## 2023-10-02 ENCOUNTER — TELEPHONE (OUTPATIENT)
Dept: INTERNAL MEDICINE CLINIC | Age: 83
End: 2023-10-02

## 2023-10-02 NOTE — TELEPHONE ENCOUNTER
Pt calling her BP has been running low---this morning it was 103/60---has been running between 91through 110 ---for about 6 days--gets very lightheaded when it does---last few days not taking the evening BP medication---what else can she do? Please call the pt. Thanks.

## 2023-10-02 NOTE — TELEPHONE ENCOUNTER
Continue to hold evening dose of lisinopril, if blood pressure continues to run low then can also discontinue hydrochlorothiazide and just do lisinopril once a day.   Ensure adequate hydration and follow-up as scheduled the end of this months

## 2023-10-09 ENCOUNTER — HOSPITAL ENCOUNTER (OUTPATIENT)
Dept: CARDIAC CATH/INVASIVE PROCEDURES | Age: 83
Discharge: HOME | End: 2023-10-09
Attending: INTERNAL MEDICINE | Admitting: INTERNAL MEDICINE
Payer: MEDICARE

## 2023-10-09 ENCOUNTER — TELEPHONE (OUTPATIENT)
Dept: CARDIOLOGY | Age: 83
End: 2023-10-09

## 2023-10-09 VITALS
RESPIRATION RATE: 21 BRPM | DIASTOLIC BLOOD PRESSURE: 67 MMHG | HEART RATE: 81 BPM | OXYGEN SATURATION: 96 % | SYSTOLIC BLOOD PRESSURE: 116 MMHG

## 2023-10-09 DIAGNOSIS — I35.0 AORTIC VALVE STENOSIS, ETIOLOGY OF CARDIAC VALVE DISEASE UNSPECIFIED: ICD-10-CM

## 2023-10-09 DIAGNOSIS — I35.0 NONRHEUMATIC AORTIC VALVE STENOSIS: Primary | ICD-10-CM

## 2023-10-09 PROCEDURE — 93325 DOPPLER ECHO COLOR FLOW MAPG: CPT

## 2023-10-09 PROCEDURE — 7100000010 HC PHASE II RECOVERY - FIRST 15 MIN

## 2023-10-09 PROCEDURE — 93312 ECHO TRANSESOPHAGEAL: CPT

## 2023-10-09 PROCEDURE — 99152 MOD SED SAME PHYS/QHP 5/>YRS: CPT

## 2023-10-09 PROCEDURE — 92960 CARDIOVERSION ELECTRIC EXT: CPT

## 2023-10-09 PROCEDURE — 93320 DOPPLER ECHO COMPLETE: CPT

## 2023-10-09 RX ORDER — SODIUM CHLORIDE 0.9 % (FLUSH) 0.9 %
5-40 SYRINGE (ML) INJECTION PRN
Status: DISCONTINUED | OUTPATIENT
Start: 2023-10-09 | End: 2023-10-09 | Stop reason: HOSPADM

## 2023-10-09 NOTE — DISCHARGE INSTRUCTIONS
ERINN DISCHARGE INSTRUCTIONS    No driving for 24 hours. We strongly recommend that a responsible adult stay with you for the next 24 hours. Continue Medications.     Advance diet as tolerated    Contact physician office  for following symptoms:  Fever  Difficulty swallowing  Chest pain  Difficulty breathing  Bleeding

## 2023-10-09 NOTE — TELEPHONE ENCOUNTER
Per Dr. Loraine Lowry, can you call pt and schedule her for a cardiac MRI? Labs done on 9/19/23 but I will order more if needed.  Thanks, VALERIE Nails RN

## 2023-10-09 NOTE — PROGRESS NOTES
Patient/family given discharge instructions. Patient/family verbalize understanding of discharge instructions, all questions addressed, copy given to patient/family. PIV removed. No complications noted. Pt transferred to vehicle via wheelchair to be discharged home with family.
IV    1236 PM - Fentanyl 50 mcg IV      ERINN probe passed and images obtained. ERINN probe removed without incident. 12:48 PM Pt waking up, respirations spontaneous, able to converse appropriately, follows commands, resting quietly.

## 2023-10-09 NOTE — TELEPHONE ENCOUNTER
Ani Roblero is scheduled on 10-15 for the MRI. Daughter Celsa Duron stated that she spoke with an RN today about possibly getting anxiety meds due to Ani Roblero being claustrophobic. Please advise.

## 2023-10-09 NOTE — H&P
CC: Dyspnea and syncope        Subjective:      History of Present Illness:     Elias Christiansen is a 79 y.o. patient with a PMH significant for AS, HTN presented with complaints of shortness of breath and syncope. Past Medical History:   has a past medical history of Cancer ([address]), COVID-19, Dry eye, Hyperlipidemia, Hypertension, Murmur, heart, and Sinus congestion. Surgical History:   has a past surgical history that includes eye surgery (Bilateral); Tonsillectomy and adenoidectomy; US BREAST BIOPSY W LOC DEVICE 1ST LESION RIGHT (Right, 07/11/2023); US BREAST BIOPSY W LOC DEVICE EACH ADDL LESION RIGHT (Right, 07/11/2023); and Mastectomy (Right, 9/6/2023). Social History:   reports that she has never smoked. She has never used smokeless tobacco. She reports current alcohol use. She reports that she does not use drugs. Family History:  family history includes Arthritis in her father; Asthma in her son; Breast Cancer in her maternal aunt and maternal cousin; Cancer in her father, maternal cousin, maternal uncle, maternal uncle, and maternal uncle; Cancer (age of onset: 61) in her maternal aunt; Diabetes in her maternal cousin and mother; Early Death in her maternal cousin and paternal grandfather; Heart Disease in her father, maternal grandfather, maternal grandmother, and mother; High Blood Pressure in her father, maternal aunt, maternal grandfather, maternal grandmother, maternal uncle, mother, paternal aunt, paternal grandfather, paternal grandmother, and paternal uncle; High Cholesterol in her father and mother; Miscarriages / Herbert Formica in her sister; Prostate Cancer in her maternal uncle; Rheum Arthritis in her father; Stroke in her paternal grandmother; Vision Loss in her mother. Home Medications:  Were reviewed and are listed in nursing record and/or below  Home Medications           Prior to Admission medications    Medication Sig Start Date End Date Taking?  Authorizing Provider

## 2023-10-10 NOTE — TELEPHONE ENCOUNTER
Katie Blandon, per Steve Ratliff advised that she will need to contact PCP to get a script for the anxiety medication.

## 2023-10-15 ENCOUNTER — HOSPITAL ENCOUNTER (OUTPATIENT)
Dept: MRI IMAGING | Age: 83
Discharge: HOME | End: 2023-10-15
Payer: MEDICARE

## 2023-10-15 DIAGNOSIS — I35.0 NONRHEUMATIC AORTIC VALVE STENOSIS: ICD-10-CM

## 2023-10-15 PROCEDURE — A9585 GADOBUTROL INJECTION: HCPCS | Performed by: INTERNAL MEDICINE

## 2023-10-15 PROCEDURE — 75565 CARD MRI VELOC FLOW MAPPING: CPT

## 2023-10-15 PROCEDURE — 75561 CARDIAC MRI FOR MORPH W/DYE: CPT | Performed by: INTERNAL MEDICINE

## 2023-10-15 PROCEDURE — 75565 CARD MRI VELOC FLOW MAPPING: CPT | Performed by: INTERNAL MEDICINE

## 2023-10-15 PROCEDURE — 6360000004 HC RX CONTRAST MEDICATION: Performed by: INTERNAL MEDICINE

## 2023-10-15 RX ORDER — GADOBUTROL 604.72 MG/ML
10 INJECTION INTRAVENOUS
Status: COMPLETED | OUTPATIENT
Start: 2023-10-15 | End: 2023-10-15

## 2023-10-15 RX ADMIN — GADOBUTROL 10 ML: 604.72 INJECTION INTRAVENOUS at 10:11

## 2023-10-20 ENCOUNTER — TELEPHONE (OUTPATIENT)
Dept: SURGERY | Age: 83
End: 2023-10-20

## 2023-10-20 PROBLEM — Z09 HOSPITAL DISCHARGE FOLLOW-UP: Status: RESOLVED | Noted: 2023-09-20 | Resolved: 2023-10-20

## 2023-10-20 NOTE — RESULT ENCOUNTER NOTE
Spoke to pt. Aware of results of moderate AS. Please call pt and schedule her for an echo with Sheila and an OV with Dr. Tonie Molina in 3 months.  Thanks, VALERIE Nails RN

## 2023-10-20 NOTE — TELEPHONE ENCOUNTER
Patient called in about the arm exercises she is doing. She complains that her arm and area under her arm feels lumpy. When she stretches her right arm out it causes pain. She is only lifting 1 lbs weights. Pt has spoken to the oncologist and PCP and they are not sure if it could be lymphedema. Patient can be reached @[phone].

## 2023-10-23 DIAGNOSIS — I35.0 NONRHEUMATIC AORTIC VALVE STENOSIS: Primary | ICD-10-CM

## 2023-10-23 NOTE — TELEPHONE ENCOUNTER
Spoke to Bisi Rivera and nurse for [employer] . The area of concern is in the bend of her arm/ elbow. Encouraged to follow up with PCP. offered to place a order for PT for lymphedema education. Encouraged to call back with any questions or concerns.      Thanks, Jluis Harris

## 2023-10-30 ENCOUNTER — OFFICE VISIT (OUTPATIENT)
Dept: INTERNAL MEDICINE CLINIC | Age: 83
End: 2023-10-30
Payer: MEDICARE

## 2023-10-30 VITALS
HEART RATE: 95 BPM | SYSTOLIC BLOOD PRESSURE: 130 MMHG | DIASTOLIC BLOOD PRESSURE: 78 MMHG | WEIGHT: 139 LBS | BODY MASS INDEX: 26.26 KG/M2 | OXYGEN SATURATION: 97 %

## 2023-10-30 DIAGNOSIS — I35.0 NONRHEUMATIC AORTIC VALVE STENOSIS: ICD-10-CM

## 2023-10-30 DIAGNOSIS — E78.00 PURE HYPERCHOLESTEROLEMIA: ICD-10-CM

## 2023-10-30 DIAGNOSIS — R73.01 IMPAIRED FASTING GLUCOSE: ICD-10-CM

## 2023-10-30 DIAGNOSIS — I26.94 MULTIPLE SUBSEGMENTAL PULMONARY EMBOLI WITHOUT ACUTE COR PULMONALE (HCC): ICD-10-CM

## 2023-10-30 DIAGNOSIS — C50.911 INVASIVE DUCTAL CARCINOMA OF BREAST, STAGE 2, RIGHT (HCC): ICD-10-CM

## 2023-10-30 DIAGNOSIS — I10 ESSENTIAL HYPERTENSION: Primary | ICD-10-CM

## 2023-10-30 PROBLEM — I46.9 CARDIOPULMONARY ARREST WITH SUCCESSFUL RESUSCITATION (HCC): Status: RESOLVED | Noted: 2023-09-14 | Resolved: 2023-10-30

## 2023-10-30 PROCEDURE — 3075F SYST BP GE 130 - 139MM HG: CPT | Performed by: INTERNAL MEDICINE

## 2023-10-30 PROCEDURE — 3078F DIAST BP <80 MM HG: CPT | Performed by: INTERNAL MEDICINE

## 2023-10-30 PROCEDURE — 99214 OFFICE O/P EST MOD 30 MIN: CPT | Performed by: INTERNAL MEDICINE

## 2023-10-30 PROCEDURE — 1123F ACP DISCUSS/DSCN MKR DOCD: CPT | Performed by: INTERNAL MEDICINE

## 2023-10-30 ASSESSMENT — ENCOUNTER SYMPTOMS
CHEST TIGHTNESS: 0
COUGH: 0
SHORTNESS OF BREATH: 0
NAUSEA: 0
WHEEZING: 0
COLOR CHANGE: 0
SORE THROAT: 0
VOMITING: 0
BACK PAIN: 0
ABDOMINAL PAIN: 0
CONSTIPATION: 0

## 2023-10-30 NOTE — ASSESSMENT & PLAN NOTE
remains asymptomatic and tolerating anticoagulation with Xarelto well without any side effects, continue same

## 2023-10-30 NOTE — ASSESSMENT & PLAN NOTE
s/p right mastectomy, she is following up with breast surgery and oncology, plan for radiation therapy to be started in near future

## 2023-10-30 NOTE — ASSESSMENT & PLAN NOTE
diet and exercise recommendations reinforced, tolerating pravastatin well, continue same, will update labs next visit

## 2023-10-30 NOTE — ASSESSMENT & PLAN NOTE
denying any exertional dyspnea or chest pain, she will follow-up with cardiology for updated ERINN echocardiogram in the near future for further evaluation

## 2023-11-01 ENCOUNTER — HOSPITAL ENCOUNTER (OUTPATIENT)
Dept: GENERAL RADIOLOGY | Age: 83
Discharge: HOME | End: 2023-11-01
Payer: MEDICARE

## 2023-11-01 DIAGNOSIS — M81.0 POSTMENOPAUSAL OSTEOPOROSIS: ICD-10-CM

## 2023-11-01 PROCEDURE — 77080 DXA BONE DENSITY AXIAL: CPT

## 2023-11-20 ENCOUNTER — TELEPHONE (OUTPATIENT)
Dept: INTERNAL MEDICINE CLINIC | Age: 83
End: 2023-11-20

## 2023-11-20 NOTE — TELEPHONE ENCOUNTER
Rivka with [facility] asking for [facility]Robin for re-certification skilled nursing.      Can leave VM confidential

## 2023-12-12 DIAGNOSIS — E78.00 PURE HYPERCHOLESTEROLEMIA: ICD-10-CM

## 2023-12-12 DIAGNOSIS — I10 ESSENTIAL HYPERTENSION: ICD-10-CM

## 2023-12-13 ENCOUNTER — PATIENT MESSAGE (OUTPATIENT)
Dept: INTERNAL MEDICINE CLINIC | Age: 83
End: 2023-12-13

## 2023-12-13 RX ORDER — PRAVASTATIN SODIUM 20 MG
20 TABLET ORAL DAILY
Qty: 90 TABLET | Refills: 1 | Status: SHIPPED | OUTPATIENT
Start: 2023-12-13

## 2023-12-13 RX ORDER — HYDROCHLOROTHIAZIDE 25 MG/1
25 TABLET ORAL EVERY MORNING
Qty: 90 TABLET | Refills: 1 | Status: SHIPPED | OUTPATIENT
Start: 2023-12-13

## 2023-12-13 NOTE — TELEPHONE ENCOUNTER
From: Lizabeth Thompson  To: Dr. Almanza Bars: 12/13/2023 8:04 AM EST  Subject: Refill of medication     I asked that refill of medication be with [facility] my prescriptions place .  .. kalie

## 2024-01-02 ENCOUNTER — OFFICE VISIT (OUTPATIENT)
Dept: INTERNAL MEDICINE CLINIC | Age: 84
End: 2024-01-02
Payer: MEDICARE

## 2024-01-02 VITALS
SYSTOLIC BLOOD PRESSURE: 114 MMHG | HEART RATE: 91 BPM | WEIGHT: 139 LBS | OXYGEN SATURATION: 98 % | BODY MASS INDEX: 26.26 KG/M2 | DIASTOLIC BLOOD PRESSURE: 72 MMHG

## 2024-01-02 DIAGNOSIS — I10 ESSENTIAL HYPERTENSION: Primary | ICD-10-CM

## 2024-01-02 DIAGNOSIS — E78.00 PURE HYPERCHOLESTEROLEMIA: ICD-10-CM

## 2024-01-02 DIAGNOSIS — I26.94 MULTIPLE SUBSEGMENTAL PULMONARY EMBOLI WITHOUT ACUTE COR PULMONALE (HCC): ICD-10-CM

## 2024-01-02 DIAGNOSIS — I82.452 ACUTE DEEP VEIN THROMBOSIS (DVT) OF LEFT PERONEAL VEIN (HCC): ICD-10-CM

## 2024-01-02 DIAGNOSIS — C50.911 INVASIVE DUCTAL CARCINOMA OF BREAST, STAGE 2, RIGHT (HCC): ICD-10-CM

## 2024-01-02 DIAGNOSIS — Z23 FLU VACCINE NEED: ICD-10-CM

## 2024-01-02 DIAGNOSIS — R73.01 IMPAIRED FASTING GLUCOSE: ICD-10-CM

## 2024-01-02 DIAGNOSIS — F41.1 GAD (GENERALIZED ANXIETY DISORDER): ICD-10-CM

## 2024-01-02 PROCEDURE — 90694 VACC AIIV4 NO PRSRV 0.5ML IM: CPT | Performed by: INTERNAL MEDICINE

## 2024-01-02 PROCEDURE — 3078F DIAST BP <80 MM HG: CPT | Performed by: INTERNAL MEDICINE

## 2024-01-02 PROCEDURE — 3074F SYST BP LT 130 MM HG: CPT | Performed by: INTERNAL MEDICINE

## 2024-01-02 PROCEDURE — 99214 OFFICE O/P EST MOD 30 MIN: CPT | Performed by: INTERNAL MEDICINE

## 2024-01-02 PROCEDURE — 1123F ACP DISCUSS/DSCN MKR DOCD: CPT | Performed by: INTERNAL MEDICINE

## 2024-01-02 PROCEDURE — G0008 ADMIN INFLUENZA VIRUS VAC: HCPCS | Performed by: INTERNAL MEDICINE

## 2024-01-02 RX ORDER — LISINOPRIL 20 MG/1
20 TABLET ORAL DAILY
Qty: 90 TABLET | Refills: 0 | Status: SHIPPED
Start: 2024-01-02

## 2024-01-02 RX ORDER — ANASTROZOLE 1 MG/1
1 TABLET ORAL
COMMUNITY
Start: 2023-10-19

## 2024-01-02 RX ORDER — CETIRIZINE HYDROCHLORIDE 10 MG/1
10 TABLET ORAL DAILY
COMMUNITY
Start: 2023-12-18

## 2024-01-02 ASSESSMENT — ENCOUNTER SYMPTOMS
ABDOMINAL PAIN: 0
SHORTNESS OF BREATH: 0
SORE THROAT: 0
COUGH: 0
NAUSEA: 0
WHEEZING: 0
CHEST TIGHTNESS: 0
CONSTIPATION: 0
COLOR CHANGE: 0
BACK PAIN: 0
VOMITING: 0

## 2024-01-02 NOTE — ASSESSMENT & PLAN NOTE
was started on anastrozole, so far tolerating well, continue care and recommendation as per oncology and radiation oncology

## 2024-01-02 NOTE — ASSESSMENT & PLAN NOTE
has been doing well on lisinopril 20 mg once a day along with hydrochlorothiazide, will continue same, reinforced recommendations to maintain healthy diet, adequate water intake and increase physical activity as tolerated

## 2024-01-02 NOTE — PROGRESS NOTES
ASSESSMENT/PLAN:  1. Essential hypertension  Assessment & Plan:    has been doing well on lisinopril 20 mg once a day along with hydrochlorothiazide, will continue same, reinforced recommendations to maintain healthy diet, adequate water intake and increase physical activity as tolerated  Orders:  -     lisinopril (PRINIVIL;ZESTRIL) 20 MG tablet; Take 1 tablet by mouth daily, Disp-90 tablet, R-0Adjust Sig  2. Pure hypercholesterolemia  Assessment & Plan:    will update labs next visit, diet and exercise recommendations reinforced  3. Impaired fasting glucose  Assessment & Plan:    healthy low-carb diet recommendations made to patient, will update labs next visit  4. Invasive ductal carcinoma of breast, stage 2, right (HCC)  Assessment & Plan:    was started on anastrozole, so far tolerating well, continue care and recommendation as per oncology and radiation oncology  5. Multiple subsegmental pulmonary emboli without acute cor pulmonale (HCC)  Assessment & Plan:    remains stable on long-term anticoagulation, tolerating well without any evidence of abnormal bleeding, remains asymptomatic  6. Acute deep vein thrombosis (DVT) of left peroneal vein (HCC)  Assessment & Plan:    continues to be asymptomatic, will continue long-term anticoagulation  7. TIFFANIE (generalized anxiety disorder)  Assessment & Plan:    doing well off medications, will continue monitoring  8. Flu vaccine need  -     Influenza, FLUAD, (age 65 y+), IM, Preservative Free, 0.5 mL      Return in about 3 months (around 4/2/2024) for AWV.     SUBJECTIVE  HPI:   Patient returns for routine follow-up on chronic medical problems, reports has been doing well, she is still following up with radiation oncology for her breast cancer, she is planning to go and visit her granddaughter in [place] at the end of this months        Review of Systems   Constitutional:  Negative for activity change, appetite change, fatigue and unexpected weight change.   HENT:  Negative

## 2024-01-02 NOTE — ASSESSMENT & PLAN NOTE
remains stable on long-term anticoagulation, tolerating well without any evidence of abnormal bleeding, remains asymptomatic

## 2024-01-09 NOTE — PROGRESS NOTES
Medical Oncology: Sarath  Radiation:  Other:        pT2N1a  STAGE:  IB right breast cancer      Ms. Colindres is a 83 y.o.-year-old woman who initially presented to me with  right breast cancer.  Since her postoperative visit Ms. Colindres has been doing quite well. Initially postop she was admitted to the hospital and identified to have extensive bilateral pulmonary emboli.  During her hospitalization she required CPR with rapid return of spontaneous circulation.  She underwent thrombectomies and is now on anticoagulation.      Her adjuvant treatment has included radiation and endocrine therapy.  She notes that she has an altered sense of taste which may be unrelated to current treatment.  She has no new breast related concerns today.        INTERVAL HISTORY:  On 9/6/2023 she underwent right mastectomy with sentinel lymph node biopsy.  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  ER positive PA positive HER2 negative.  Margins were negative.  Dermal involvement was noted without ulceration.  There is 1/4 lymph nodes involved with carcinoma. [id number]     Initiated on anastrozole    Exam:  Physical exam has been reviewed and updated  General: no acute distress  Breast:  The patient was examined in the upright and supine position. There is a well healed scar on the right chest wall.  There are expected  post surgical and radiation related changes.  She has moderate range of motion with her arm.  Her contralateral breast shows no new masses or changes in breast contour.  There were no skin changes of the breast or nipple areolar complex.  There was no nipple inversion or discharge.   There is no axillary lymphadenopathy palpated bilaterally.  Respiratory: respirations are non-labored and there is no audible distress  Cardiovascular: regular rate, extremities appear well perfused  Neurologic: alert, oriented      Assessment/Plan:  pT2N1a  STAGE:  IB right breast cancer  ER/PA positive HER2

## 2024-01-15 ENCOUNTER — OFFICE VISIT (OUTPATIENT)
Dept: SURGERY | Age: 84
End: 2024-01-15
Payer: MEDICARE

## 2024-01-15 VITALS — WEIGHT: 139 LBS | DIASTOLIC BLOOD PRESSURE: 77 MMHG | SYSTOLIC BLOOD PRESSURE: 137 MMHG | BODY MASS INDEX: 26.26 KG/M2

## 2024-01-15 DIAGNOSIS — Z12.31 SCREENING MAMMOGRAM, ENCOUNTER FOR: ICD-10-CM

## 2024-01-15 DIAGNOSIS — Z12.39 SCREENING BREAST EXAMINATION: ICD-10-CM

## 2024-01-15 DIAGNOSIS — Z85.3 ENCOUNTER FOR FOLLOW-UP SURVEILLANCE OF BREAST CANCER: ICD-10-CM

## 2024-01-15 DIAGNOSIS — Z85.3 PERSONAL HISTORY OF BREAST CANCER: Primary | ICD-10-CM

## 2024-01-15 DIAGNOSIS — Z08 ENCOUNTER FOR FOLLOW-UP SURVEILLANCE OF BREAST CANCER: ICD-10-CM

## 2024-01-15 PROCEDURE — 3075F SYST BP GE 130 - 139MM HG: CPT | Performed by: SURGERY

## 2024-01-15 PROCEDURE — 99214 OFFICE O/P EST MOD 30 MIN: CPT | Performed by: SURGERY

## 2024-01-15 PROCEDURE — 3078F DIAST BP <80 MM HG: CPT | Performed by: SURGERY

## 2024-01-15 PROCEDURE — 1123F ACP DISCUSS/DSCN MKR DOCD: CPT | Performed by: SURGERY

## 2024-01-17 NOTE — PROGRESS NOTES
(3/2023)  Continue statin therapy with Pravachol.     DVT  On OAC, Xarelto      Will follow per Becky          Thank you for allowing us to participate in the care of Bisi GIANFRANCO Colindres.  Please do not hesitate to contact me if you have any questions.    Cody Garner MD, MPH    [facility]  [address], [address]   [place] [address]  Ph: [phone]  Fax: [phone]    This note was scribed in my presence by Janee Mcfadden RN   Physician Attestation:  The scribes documentation has been prepared under my direction and personally reviewed by me in its entirety.     I confirm that the note above accurately reflects all work, treatment, procedures, and medical decision making performed by me.

## 2024-01-18 ENCOUNTER — OFFICE VISIT (OUTPATIENT)
Dept: CARDIOLOGY CLINIC | Age: 84
End: 2024-01-18

## 2024-01-18 ENCOUNTER — HOSPITAL ENCOUNTER (OUTPATIENT)
Dept: NON INVASIVE DIAGNOSTICS | Age: 84
Discharge: HOME | End: 2024-01-18
Payer: MEDICARE

## 2024-01-18 VITALS
BODY MASS INDEX: 25.89 KG/M2 | SYSTOLIC BLOOD PRESSURE: 132 MMHG | DIASTOLIC BLOOD PRESSURE: 70 MMHG | OXYGEN SATURATION: 97 % | HEART RATE: 97 BPM | WEIGHT: 137 LBS

## 2024-01-18 DIAGNOSIS — I82.402 DEEP VEIN THROMBOSIS (DVT) OF LEFT LOWER EXTREMITY, UNSPECIFIED CHRONICITY, UNSPECIFIED VEIN (HCC): ICD-10-CM

## 2024-01-18 DIAGNOSIS — I35.0 NONRHEUMATIC AORTIC VALVE STENOSIS: Primary | ICD-10-CM

## 2024-01-18 DIAGNOSIS — E78.2 MIXED HYPERLIPIDEMIA: ICD-10-CM

## 2024-01-18 DIAGNOSIS — I35.0 NONRHEUMATIC AORTIC VALVE STENOSIS: ICD-10-CM

## 2024-01-18 DIAGNOSIS — I10 ESSENTIAL HYPERTENSION: ICD-10-CM

## 2024-01-18 PROCEDURE — C8929 TTE W OR WO FOL WCON,DOPPLER: HCPCS

## 2024-01-18 PROCEDURE — 6360000004 HC RX CONTRAST MEDICATION: Performed by: INTERNAL MEDICINE

## 2024-01-18 RX ADMIN — PERFLUTREN 1.5 ML: 6.52 INJECTION, SUSPENSION INTRAVENOUS at 13:51

## 2024-01-22 ENCOUNTER — TELEPHONE (OUTPATIENT)
Dept: CARDIOLOGY | Age: 84
End: 2024-01-22

## 2024-01-22 DIAGNOSIS — I35.0 NONRHEUMATIC AORTIC VALVE STENOSIS: Primary | ICD-10-CM

## 2024-01-22 DIAGNOSIS — R42 DIZZINESS: ICD-10-CM

## 2024-01-22 NOTE — TELEPHONE ENCOUNTER
Moira can you schedule pt for a cath with Dr. Garner at [place] or [facility] for TAVR BURGESS? She has  no dye allergy, she will need to hold Xarelto 48 hours prior. At least a week before or after cath, she will need a TAVR CTA and carotid US at [facility]. She has no port and lives at home. Labs are ordered. She will be back from a trip to [place] on 1/28/24. Thanks, Vanessa VENEGAS

## 2024-01-29 NOTE — TELEPHONE ENCOUNTER
Called Daughter mary jo Moser asking for a return call to schedule the LHC, CTA & Carotid for Bisi.

## 2024-01-30 NOTE — TELEPHONE ENCOUNTER
Called Shanti and had to leave 2nd  asking to return call asa to get LHC scheduled with AV on 2-7.

## 2024-02-01 ENCOUNTER — PATIENT MESSAGE (OUTPATIENT)
Dept: CARDIOLOGY CLINIC | Age: 84
End: 2024-02-01

## 2024-02-01 NOTE — TELEPHONE ENCOUNTER
TAVR testing is scheduled per below:    2-21-24  8:00 am - [facility] CTA CHEST ABD PELVIC W/CONTRAST  PREPS:  * NPO 4 hours prior to procedure  * Arrive 30 minutes prior to exam (7:30 am)  * No necklaces, underwire bras, body piercing, no pants with zippers, belt or suspenders  * Bring a complete list of medications or the test cannot be completed.     9:00 am - CAROTID STUDY  * Please wear easy to remove clothing  * Please take all medication, unless otherwise instructed  * You will be here for approximately 1 hour    Lab work will be drawn prior to 2-19-24.      3-4-24 / 12:30 - 2:00 pm - [facility] w/Patsy @ [facility]  PREPS:  Bring a list of your medications.  Please notify us before the procedure if you are allergic to anything, especially x-ray contrast dye, iodine, nickel, or any type of jewelry. This is especially important.   Do not eat or drink anything at all after midnight (or 8 hours) prior to the procedure.  Take all morning medications EXCEPT any diuretics hydrochlorothiazide/HCTZ (water pills) the day of the procedure with a small amount of water.  Hold the Xarelto 48 hours prior to procedure, do not take after 3-1-24 until instructed to resume.   You MUST have someone to drive you home-NO driving for 24 hours after your procedure. If intervention is preformed, you might stay overnight in the hospital.  Discharge instructions will be given to you at the time of your procedure.

## 2024-02-01 NOTE — TELEPHONE ENCOUNTER
Spoke with Shanti (1-31-24) confirmed that the first part of Feb they will be out of town a few times and she will be having knee surgery on the 27th. She also thought that the cath would be done in  - they do prefer to come there due to transportation issues and not being familiar with the [place] side of [place].  I reached out to Dr Garner with this request and awaiting a date & time this could be done in [facility]. We did schedule the CTA/Carotid for 2-21-24 at [facility].  I will provide the preps through [employer] for these 2 tests.  Once a date for the Cath is confirmed I will [facility] place [facility]

## 2024-02-02 NOTE — TELEPHONE ENCOUNTER
From: Lynette HOOKS  To: Bisi Marquezcb  Sent: 2/1/2024 2:02 PM EST  Subject: Heart Catheterization Prep Instructions for 3-4-24 at [facility] with Dr. Cody Garner    [address] [place] [address]      Left Heart Catheterization    What is a Left Heart Catheterization?    This is a procedure that provides your cardiologist with detailed information regarding how your heart functions. A small catheter (long, fine tube) is inserted into an artery (a vessel that carries blood and oxygen) that leads to your heart. While watching with x-ray equipment, lesser amounts of dye are injected which enables visualization of the heart arteries and chambers. The pictures that your cardiologist receives from the cardiac catheterization enable him or her to decide on the best treatment for you.    Instructions for your Left Heart Catheterization:    Bring a list of your medications.  Please notify us before the procedure if you are allergic to anything, especially x-ray contrast dye, iodine, nickel, or any type of jewelry. This is especially important.   Do not eat or drink anything at all after midnight (or 8 hours) prior to the procedure.  Take all morning medications EXCEPT any diuretics hydrochlorothiazide/HCTZ (water pills) the day of the procedure with a small amount of water.  Hold the Xarelto 48 hours prior to procedure, do not take after 3-1-24 until instructed to resume.   You MUST have someone to drive you home-NO driving for 24 hours after your procedure. If intervention is preformed, you might stay overnight in the hospital.  Discharge instructions will be given to you at the time of your procedure.      Date of procedure: 3-4-24  Time of patient arrival: 12:30 pm - Procedure at 2:00 pm  Cardiologist performing procedure: Dr. Cody Garner  Please report to the: Round Information Desk in Lobby of [facility]  In case of questions or to reschedule please call [phone], ask for Althea

## 2024-02-05 DIAGNOSIS — I35.0 NONRHEUMATIC AORTIC VALVE STENOSIS: ICD-10-CM

## 2024-02-05 LAB
DEPRECATED RDW RBC AUTO: 15 % (ref 12.4–15.4)
HCT VFR BLD AUTO: 38 % (ref 36–48)
HGB BLD-MCNC: 13.2 G/DL (ref 12–16)
MCH RBC QN AUTO: 32.4 PG (ref 26–34)
MCHC RBC AUTO-ENTMCNC: 34.6 G/DL (ref 31–36)
MCV RBC AUTO: 93.5 FL (ref 80–100)
PLATELET # BLD AUTO: 293 K/UL (ref 135–450)
PMV BLD AUTO: 8.3 FL (ref 5–10.5)
RBC # BLD AUTO: 4.06 M/UL (ref 4–5.2)
WBC # BLD AUTO: 6.6 K/UL (ref 4–11)

## 2024-02-06 ENCOUNTER — TELEPHONE (OUTPATIENT)
Dept: CARDIOTHORACIC SURGERY | Age: 84
End: 2024-02-06

## 2024-02-06 LAB
ANION GAP SERPL CALCULATED.3IONS-SCNC: 12 MMOL/L (ref 3–16)
BUN SERPL-MCNC: 14 MG/DL (ref 7–20)
CALCIUM SERPL-MCNC: 8.8 MG/DL (ref 8.3–10.6)
CHLORIDE SERPL-SCNC: 96 MMOL/L (ref 99–110)
CO2 SERPL-SCNC: 27 MMOL/L (ref 21–32)
CREAT SERPL-MCNC: 0.8 MG/DL (ref 0.6–1.2)
GFR SERPLBLD CREATININE-BSD FMLA CKD-EPI: >60 ML/MIN/{1.73_M2}
GLUCOSE SERPL-MCNC: 111 MG/DL (ref 70–99)
POTASSIUM SERPL-SCNC: 4.4 MMOL/L (ref 3.5–5.1)
SODIUM SERPL-SCNC: 135 MMOL/L (ref 136–145)

## 2024-02-19 ENCOUNTER — PATIENT MESSAGE (OUTPATIENT)
Dept: INTERNAL MEDICINE CLINIC | Age: 84
End: 2024-02-19

## 2024-02-19 DIAGNOSIS — E78.00 PURE HYPERCHOLESTEROLEMIA: ICD-10-CM

## 2024-02-19 RX ORDER — PRAVASTATIN SODIUM 20 MG
20 TABLET ORAL DAILY
Qty: 90 TABLET | Refills: 1 | Status: SHIPPED | OUTPATIENT
Start: 2024-02-19

## 2024-02-19 NOTE — TELEPHONE ENCOUNTER
Care Transitions Initial Call    Call within 2 business days of discharge: Yes     Patient: Alma Segovia Patient : 1938 MRN: [id number]    [facility] Discharge [facility]       Complaint Diagnosis Description Type Department Provider    21 Tremors Dizziness . .. ED to Hosp-Admission (Discharged) (ADMIT) Jarred Rodriguez MD; Nia Holland ... Was this an external facility discharge? No     Challenges to be reviewed by the provider   Additional needs identified to be addressed with provider: yes  Needs BRYON with pcp this week. [facility] -8/15 Dizziness. Method of communication with provider : chart routing    Discussed [id number] related testing which was not done at this time. Advance Care Planning:   Does patient have an Advance Directive: yes, reviewed and current, yes, reviewed and needs to be updated, decision makers updated, not on file; education provided, not on file, patient declined education, referral to internal ACP facilitator. Inpatient Readmission Risk score: No data recorded  Was this a readmission? no   Patient stated reason for the admission: tremors in right hand and weakness in right arm    Patients top risk factors for readmission: functional cognitive ability and medical condition-Alzheimers,DM,CVA,HTN,MDD   Interventions to address risk factors: Scheduled appointment with PCP-Daughter to call-CTN will request visit as well for this week., Scheduled appointment with Specialist-daughter calling  to schedule. and Obtained and reviewed discharge summary and/or continuity of care documents    Care Transition Nurse (CTN) contacted the patient by telephone to perform post hospital discharge assessment. Verified name and  with patient as identifiers. Provided introduction to self, and explanation of the CTN role. Spoke briefly with patient. Says she feels good. Has not noticed any tremors in right hand today. No weakness noted.  Then spoke with Pended to [facility].    daughter regarding rest of assessment. CTN reviewed discharge instructions, medical action plan and red flags with family who verbalized understanding. Were discharge instructions available to patient? yes. Reviewed appropriate site of care based on symptoms and resources available to patient including: PCP, Specialist, Urgent [address], When to call 911 and [address]. Family given an opportunity to ask questions and does not have any further questions or concerns at this time. The family agrees to contact the PCP office for questions related to their healthcare. Medication reconciliation was performed with family, who verbalizes understanding of administration of home medications. Advised obtaining a 90-day supply of all daily and as-needed medications. Referral to Pharm D needed: no     Home Health/Outpatient orders at discharge: home health care and [address]: [address] [facility]  Date of initial visit: pending    [address] [facility] ordered at discharge: None  [address] [facility] [facility] [address]: na  Durable Medical Equipment received: na    Covid Risk Education    Educated patient about risk for severe COVID-19 due to risk factors according to CDC guidelines. CTN reviewed discharge instructions, medical action plan and red flag symptoms with the family who verbalized understanding. Discussed COVID vaccination status: yes. Education provided on COVID-19 vaccination as appropriate. Discussed exposure protocols and quarantine with CDC Guidelines. Family was given an opportunity to verbalize any questions and concerns and agrees to contact CTN or health care provider for questions related to their healthcare. Was patient discharged with a pulse oximeter? No.    Discussed follow-up appointments. If no appointment was previously scheduled, appointment scheduling offered: yes. Is follow up appointment scheduled within 7 days of discharge? pending.    Krystle gilbert appointment(s):   Future Appointments   Date Time Provider Hardik Parryi   8/26/2021  5:00 PM [facility] ER 1 [facility]   9/28/2021  3:00 PM Laurence Alvarez, [facility] BS AMB   3/4/2022  9:40 AM Candida Andrew MD [facility] BS AMB     Non-[facility] follow up appointment(s): na.    Plan for follow-up call in 7-10 days based on severity of symptoms and risk factors. Plan for next call: symptom management-assess current symptoms, self management-following discharge instructions, follow up appointment-saw pcp for BRYON visit and neuro for f/u appt. and medication management-taking meds as ordered. CTN provided contact information for future needs. Goals Addressed                 This Visit's Progress     Understands red flags post discharge. 8/16/21 [facility] 8/14-8/15 Dizziness   Reviewed discharge instructions with daughter   Reviewed meds with daughter. Education provided on new med.  Reviewed red flags: weakness,numbness on one side of body, dizziness,falls, sob, chest discomfort, nausea,vomiting,diarrhea.  Given info on Macario Jules as resource.  Given CTN contact info if any questions or concerns.    CTN to check back in about a week, sooner [email or web address]

## 2024-02-19 NOTE — TELEPHONE ENCOUNTER
From: Bisi Adkins  To: Dr. Mo Reese  Sent: 2/19/2024 3:15 PM EST  Subject: Refill prescription Pravastatin. 20mg    I need a refill on my pravastatih. However my prescriptions are filled by [facility]. I did tell your staff about the change Jan 2. .i am running out.   Bisi adkins

## 2024-02-21 ENCOUNTER — HOSPITAL ENCOUNTER (OUTPATIENT)
Dept: CT IMAGING | Age: 84
Discharge: HOME | End: 2024-02-21
Payer: MEDICARE

## 2024-02-21 ENCOUNTER — HOSPITAL ENCOUNTER (OUTPATIENT)
Dept: VASCULAR LAB | Age: 84
Discharge: HOME | End: 2024-02-21
Payer: MEDICARE

## 2024-02-21 DIAGNOSIS — I35.0 NONRHEUMATIC AORTIC VALVE STENOSIS: ICD-10-CM

## 2024-02-21 DIAGNOSIS — R42 DIZZINESS: ICD-10-CM

## 2024-02-21 PROCEDURE — 74174 CTA ABD&PLVS W/CONTRAST: CPT

## 2024-02-21 PROCEDURE — 6360000004 HC RX CONTRAST MEDICATION: Performed by: INTERNAL MEDICINE

## 2024-02-21 PROCEDURE — 93880 EXTRACRANIAL BILAT STUDY: CPT

## 2024-02-21 RX ADMIN — IOPAMIDOL 150 ML: 755 INJECTION, SOLUTION INTRAVENOUS at 08:05

## 2024-03-04 ENCOUNTER — HOSPITAL ENCOUNTER (OUTPATIENT)
Dept: CARDIAC CATH/INVASIVE PROCEDURES | Age: 84
Discharge: HOME | End: 2024-03-04
Attending: INTERNAL MEDICINE | Admitting: INTERNAL MEDICINE
Payer: MEDICARE

## 2024-03-04 VITALS
TEMPERATURE: 97.9 F | HEIGHT: 61 IN | WEIGHT: 137 LBS | RESPIRATION RATE: 14 BRPM | BODY MASS INDEX: 25.86 KG/M2 | HEART RATE: 91 BPM | DIASTOLIC BLOOD PRESSURE: 54 MMHG | SYSTOLIC BLOOD PRESSURE: 107 MMHG | OXYGEN SATURATION: 100 %

## 2024-03-04 LAB
ANION GAP SERPL CALCULATED.3IONS-SCNC: 11 MMOL/L (ref 3–16)
BUN SERPL-MCNC: 19 MG/DL (ref 7–20)
CALCIUM SERPL-MCNC: 9.2 MG/DL (ref 8.3–10.6)
CHLORIDE SERPL-SCNC: 100 MMOL/L (ref 99–110)
CO2 SERPL-SCNC: 25 MMOL/L (ref 21–32)
CREAT SERPL-MCNC: 0.7 MG/DL (ref 0.6–1.2)
DEPRECATED RDW RBC AUTO: 14.9 % (ref 12.4–15.4)
EKG ATRIAL RATE: 95 BPM
EKG DIAGNOSIS: NORMAL
EKG P AXIS: 63 DEGREES
EKG P-R INTERVAL: 146 MS
EKG Q-T INTERVAL: 346 MS
EKG QRS DURATION: 70 MS
EKG QTC CALCULATION (BAZETT): 434 MS
EKG R AXIS: 42 DEGREES
EKG T AXIS: 53 DEGREES
EKG VENTRICULAR RATE: 95 BPM
GFR SERPLBLD CREATININE-BSD FMLA CKD-EPI: >60 ML/MIN/{1.73_M2}
GLUCOSE SERPL-MCNC: 120 MG/DL (ref 70–99)
HCT VFR BLD AUTO: 35 % (ref 36–48)
HGB BLD-MCNC: 12 G/DL (ref 12–16)
MCH RBC QN AUTO: 32.7 PG (ref 26–34)
MCHC RBC AUTO-ENTMCNC: 34.2 G/DL (ref 31–36)
MCV RBC AUTO: 95.6 FL (ref 80–100)
PLATELET # BLD AUTO: 266 K/UL (ref 135–450)
PMV BLD AUTO: 7.6 FL (ref 5–10.5)
POTASSIUM SERPL-SCNC: 3.9 MMOL/L (ref 3.5–5.1)
RBC # BLD AUTO: 3.66 M/UL (ref 4–5.2)
SODIUM SERPL-SCNC: 136 MMOL/L (ref 136–145)
WBC # BLD AUTO: 5.8 K/UL (ref 4–11)

## 2024-03-04 PROCEDURE — 93005 ELECTROCARDIOGRAM TRACING: CPT | Performed by: INTERNAL MEDICINE

## 2024-03-04 PROCEDURE — C1769 GUIDE WIRE: HCPCS

## 2024-03-04 PROCEDURE — 2500000003 HC RX 250 WO HCPCS

## 2024-03-04 PROCEDURE — 36415 COLL VENOUS BLD VENIPUNCTURE: CPT

## 2024-03-04 PROCEDURE — 93454 CORONARY ARTERY ANGIO S&I: CPT | Performed by: INTERNAL MEDICINE

## 2024-03-04 PROCEDURE — 85027 COMPLETE CBC AUTOMATED: CPT

## 2024-03-04 PROCEDURE — 6360000002 HC RX W HCPCS: Performed by: INTERNAL MEDICINE

## 2024-03-04 PROCEDURE — C1894 INTRO/SHEATH, NON-LASER: HCPCS

## 2024-03-04 PROCEDURE — 93454 CORONARY ARTERY ANGIO S&I: CPT

## 2024-03-04 PROCEDURE — 6360000002 HC RX W HCPCS

## 2024-03-04 PROCEDURE — 93010 ELECTROCARDIOGRAM REPORT: CPT | Performed by: INTERNAL MEDICINE

## 2024-03-04 PROCEDURE — 2709999900 HC NON-CHARGEABLE SUPPLY

## 2024-03-04 PROCEDURE — 80048 BASIC METABOLIC PNL TOTAL CA: CPT

## 2024-03-04 PROCEDURE — 6360000004 HC RX CONTRAST MEDICATION: Performed by: INTERNAL MEDICINE

## 2024-03-04 PROCEDURE — 99152 MOD SED SAME PHYS/QHP 5/>YRS: CPT

## 2024-03-04 RX ORDER — ONDANSETRON 2 MG/ML
4 INJECTION INTRAMUSCULAR; INTRAVENOUS EVERY 6 HOURS PRN
Status: DISCONTINUED | OUTPATIENT
Start: 2024-03-04 | End: 2024-03-04 | Stop reason: HOSPADM

## 2024-03-04 RX ORDER — SODIUM CHLORIDE 0.9 % (FLUSH) 0.9 %
5-40 SYRINGE (ML) INJECTION PRN
Status: DISCONTINUED | OUTPATIENT
Start: 2024-03-04 | End: 2024-03-04 | Stop reason: HOSPADM

## 2024-03-04 RX ORDER — SODIUM CHLORIDE 9 MG/ML
INJECTION, SOLUTION INTRAVENOUS CONTINUOUS
Status: DISCONTINUED | OUTPATIENT
Start: 2024-03-04 | End: 2024-03-04 | Stop reason: HOSPADM

## 2024-03-04 RX ORDER — SODIUM CHLORIDE 0.9 % (FLUSH) 0.9 %
5-40 SYRINGE (ML) INJECTION EVERY 12 HOURS SCHEDULED
Status: DISCONTINUED | OUTPATIENT
Start: 2024-03-04 | End: 2024-03-04 | Stop reason: HOSPADM

## 2024-03-04 RX ORDER — ACETAMINOPHEN 325 MG/1
650 TABLET ORAL EVERY 4 HOURS PRN
Status: DISCONTINUED | OUTPATIENT
Start: 2024-03-04 | End: 2024-03-04 | Stop reason: HOSPADM

## 2024-03-04 RX ORDER — SODIUM CHLORIDE 9 MG/ML
INJECTION, SOLUTION INTRAVENOUS PRN
Status: DISCONTINUED | OUTPATIENT
Start: 2024-03-04 | End: 2024-03-04 | Stop reason: HOSPADM

## 2024-03-04 RX ADMIN — IOPAMIDOL 26 ML: 755 INJECTION, SOLUTION INTRAVENOUS at 14:44

## 2024-03-04 RX ADMIN — HYDROCORTISONE SODIUM SUCCINATE 100 MG: 100 INJECTION, POWDER, FOR SOLUTION INTRAMUSCULAR; INTRAVENOUS at 13:40

## 2024-03-04 NOTE — H&P
Reason for Referral: Aortic Stenosis     Referring physician: Mo Reese     CC: \"I am feeling fine\"        Subjective:      History of Present Illness:     Bisi Colindres is a 83 y.o. patient with a PMH significant for aortic stenosis, HTN, HLD,Left lower extremity DVT on OAC, and breast cancer and who is seeing us for evaluation for TAVR. Who presented with complaints of shortness of breath  .     Today, she reports that she is feeling fine, she does report she has some shortness of breath when going up and down the stairs.   Patient currently denies any weight gain, edema, palpitations, chest pain, dizziness, and syncope.      Past Medical History:   has a past medical history of Cancer (HCC), COVID-19, Dry eye, Hyperlipidemia, Hypertension, Murmur, heart, and Sinus congestion.     Surgical History:   has a past surgical history that includes eye surgery (Bilateral); Tonsillectomy and adenoidectomy; US BREAST BIOPSY W LOC DEVICE 1ST LESION RIGHT (Right, 07/11/2023); US BREAST BIOPSY W LOC DEVICE EACH ADDL LESION RIGHT (Right, 07/11/2023); and Mastectomy (Right, 9/6/2023).      Social History:   reports that she has never smoked. She has never used smokeless tobacco. She reports current alcohol use. She reports that she does not use drugs.      Family History:  family history includes Arthritis in her father; Asthma in her son; Breast Cancer in her maternal aunt and maternal cousin; Cancer in her father, maternal cousin, maternal uncle, maternal uncle, and maternal uncle; Cancer (age of onset: 60) in her maternal aunt; Diabetes in her maternal cousin and mother; Early Death in her maternal cousin and paternal grandfather; Heart Disease in her father, maternal grandfather, maternal grandmother, and mother; High Blood Pressure in her father, maternal aunt, maternal grandfather, maternal grandmother, maternal uncle, mother, paternal aunt, paternal grandfather, paternal grandmother, and paternal uncle; High

## 2024-03-04 NOTE — DISCHARGE INSTRUCTIONS
Radial Angiogram      Care of your puncture site:  Remove clear bandage 24 hours after the procedure.  May shower in 24 hours  Inspect the site daily and gently clean using soap and water.  Dry thoroughly and apply a Band-Aid.    Normal Observations:  Soreness or tenderness which may last one week.  Mild oozing from the incision site.  Possible bruising that could last 2 weeks.    Activity:  You may resume driving 24 hours following the procedure.  You may resume normal activity in 3 days or after the wound heals.  Avoid lifting more than 10 pounds for 3 days with affected arm.    Nutrition:  Regular diet.  Drink at least 8 to 10 glasses of decaffeinated, non-alcoholic fluid for the next 24 hours to flush the x-ray dye used for your angiogram out of your body.    Call your doctor immediately if your condition worsens, for any other concerns, for a follow-up appointment or if you experience any of the following:  Significant bleeding that does not stop after 10 minutes of applying firm pressure on the puncture site.  Increased swelling of the wrist.  Unusual pain, numbness, or tingling of the wrist/arm.   Any signs of infection such as: redness, yellow drainage at the site, swelling or pain.    Other Instructions:  Becky will contact you about follow-up.

## 2024-03-04 NOTE — PROGRESS NOTES
PRE-PROCEDURE    DATE: 3/4/2024 ARRIVAL TO CATH LAB: 12:51 PM    ADMIT SOURCE: Outpatient    ID & ALLERGY BAND: On    CONSENT: Yes    NPO SINCE: Midnight    LABS/PREGNANCY TEST: N/A    PULSES:  Right Radial Artery 2+  Right DP 2+  Right PT 1+  Left DP 2+  Left PT 1+    IV SITE : Started in left arm.  with fluids infusing at kvo 12:51 PM     SURGERIES PLANNED: No    BLEEDING PROBLEMS: No    BLEEDING RISK: Low Risk <2%    COMPLIANCE: Yes      PATIENT HISTORY    CHIEF COMPLAINT: Shortness of Breath    EKG:  Yes    Pre CATH Rhythm: Normal Sinus Rhythm    STRESS TEST PREFORMED:  No    CARDIAC CTA PREFORMED:  No    AGATSTON CORONARY CALCIUM SCORE:   Assessed: No    ECHO: DATE:  Yes.  Most recent date: 1/18/2024      EF:  70    HYPERTENSION: Yes    DYSLIPIDEMIA: Yes    FAMILY HX OF CAD: Yes    PRIOR MI: No    PRIOR PCI: No    PRIOR CABG: No    CEREBRALVASCULAR DX: No    PERIPHERAL ARTERIAL DISEASE: No    CHRONIC LUNG DISEASE: No    TOBACCO: Never    DIABETIC: No    CARDIAC ARREST: No    DIALYSIS: No    FRAILTY SCORE: 3 MANAGING WELL (medical problems are well controlled, not regularly active beyond routine walking)

## 2024-03-04 NOTE — PROGRESS NOTES
TR band removed and tegaderm placed on right wrist.  Pt ambulated in room without difficulty.  Patient/family given discharge instructions. Patient/family verbalize understanding of discharge instructions, all questions addressed, copy given to patient/family. PIV removed. No complications noted. Pt transferred to vehicle via wheelchair to be discharged home with family.     Simple: Patient demonstrates quick and easy understanding

## 2024-03-04 NOTE — PROCEDURES
[facility]   Procedure Note    CLINICAL HISTORY AND INDICATIONS:       Bisi Colindres is a 83 y.o. female with a history of aortic stenosis.  [id number]}.        INFORMED CONSENT:      Informed consent was obtained from the patient and the family members.  I explained to them risks and benefits of the procedure.  I explained to them we will do this from either the common femoral artery access or radial access. I explained to the patient that we will use lidocaine for local anaesthesia and moderate sedation.  I explained to them any risk of perforation of the vessel, stroke, heart attack, bleeding, death and myocardial infarction during the procedure.  The patient understood the risks and benefits and gave informed consent and would like to go ahead with the procedure.    Patient agreed to use dual antiplatelet therapy.      PROCEDURES PERFORMED:     [facility]    PROCEDURE TECHNIQUE:      Radial Access: The patient was approached from the right radial artery using a 5-6  Luxembourger slender sheath.  Left coronary angiography was done using a Pippa L3.5 diagnostic catheter.  Right coronary angiography was done using a Pippa R4 guide catheter.         COMPLICATIONS:  None.      At the end of the procedure a radial band device was used for hemostasis.      EBL: 25 cc    Moderate Sedation:    ASA 2  Mallampati 2      An independent trained observer pushed medications at my direction. We monitored the patient's level of consciousness and vital signs/physiologic status throughout the procedure duration (see start and start times above).         ANGIOGRAM/CORONARY ARTERIOGRAM:          Left dominant system    1.  The left main coronary artery arising normal fashion from the left coronary cusp giving rise to the left anterior descending artery and the left circumflex artery.  There is RASHIDA 3 flow.  0% STENOSIS     2.  The right coronary artery arises from right coronary ostium in normal fashion.  The RCA gives rise to RV

## 2024-03-07 ENCOUNTER — OFFICE VISIT (OUTPATIENT)
Age: 84
End: 2024-03-07
Payer: MEDICARE

## 2024-03-07 VITALS
OXYGEN SATURATION: 99 % | DIASTOLIC BLOOD PRESSURE: 72 MMHG | BODY MASS INDEX: 25.49 KG/M2 | SYSTOLIC BLOOD PRESSURE: 148 MMHG | HEART RATE: 89 BPM | WEIGHT: 135 LBS | HEIGHT: 61 IN

## 2024-03-07 DIAGNOSIS — Z86.711 HX OF PULMONARY EMBOLUS: ICD-10-CM

## 2024-03-07 DIAGNOSIS — E78.00 PURE HYPERCHOLESTEROLEMIA: ICD-10-CM

## 2024-03-07 DIAGNOSIS — I35.0 SYMPTOMATIC SEVERE AORTIC STENOSIS WITH NORMAL EJECTION FRACTION: Primary | ICD-10-CM

## 2024-03-07 DIAGNOSIS — Z90.11 S/P RIGHT MASTECTOMY: ICD-10-CM

## 2024-03-07 DIAGNOSIS — I10 ESSENTIAL HYPERTENSION: ICD-10-CM

## 2024-03-07 PROCEDURE — 3077F SYST BP >= 140 MM HG: CPT | Performed by: STUDENT IN AN ORGANIZED HEALTH CARE EDUCATION/TRAINING PROGRAM

## 2024-03-07 PROCEDURE — 1123F ACP DISCUSS/DSCN MKR DOCD: CPT | Performed by: STUDENT IN AN ORGANIZED HEALTH CARE EDUCATION/TRAINING PROGRAM

## 2024-03-07 PROCEDURE — 99205 OFFICE O/P NEW HI 60 MIN: CPT | Performed by: STUDENT IN AN ORGANIZED HEALTH CARE EDUCATION/TRAINING PROGRAM

## 2024-03-07 PROCEDURE — 3078F DIAST BP <80 MM HG: CPT | Performed by: STUDENT IN AN ORGANIZED HEALTH CARE EDUCATION/TRAINING PROGRAM

## 2024-03-07 NOTE — PROGRESS NOTES
Review of Systems:  Constitutional: + Fatigue. No night sweats, headaches, weight loss.  Eyes:  No glaucoma. S/p cataract removal.   ENMT:  No nosebleeds, deviated septum.   Cardiac:  No arrhythmias.  Vascular: + L DVT, bilateral PE's following mastectomy.  No claudication, varicosities.  GI:  No PUD, heartburn.  :  No kidney stones, frequent UTIs  Musculoskeletal:  +arthritis, No gout.  Respiratory:  + SOB w/stairs/grades. No emphysema, asthma.  Integumentary:  No dermatitis, itching, rash.  Neurological:  No stroke, TIAs, seizures. + Lightheadedness.   Psychiatric:  No depression. +anxiety.  Endocrine: No diabetes, thyroid issues.  Hematologic:  + Xarelto, easy bruising. No bleeding.  Immunologic: + R breast cancer S/p R mastectomy 09/2023( Had 25 radiation treatments-last treatment 12/2023). No steroid therapies.    
heart diesease 41    Asthma Son     Breast Cancer Maternal Cousin     Cancer Maternal Cousin     Cancer Maternal Uncle     Cancer Maternal Uncle     Prostate Cancer Maternal Uncle     Diabetes Maternal Cousin         childhood diabetes    Early Death Maternal Cousin         42    Miscarriages / Stillbirths Sister        Current Outpatient Medications:  Current Outpatient Medications   Medication Instructions    anastrozole (ARIMIDEX) 1 mg, Oral    aspirin 81 mg, Oral, DAILY    Biotin 5000 MCG TABS Oral, DAILY    CALCIUM/MAGNESIUM/ZINC FORMULA PO 1 tablet, Oral, DAILY    cetirizine (ZYRTEC) 10 mg, Oral, DAILY PRN    Handicap Placard MISC Does not apply, Valid 9/20/2023 till 9/20/2024    hydroCHLOROthiazide (HYDRODIURIL) 25 mg, Oral, EVERY MORNING    lisinopril (PRINIVIL;ZESTRIL) 20 mg, Oral, DAILY    Multiple Vitamins-Minerals (CENTRUM SILVER PO) 1 tablet, DAILY    Polyvinyl Alcohol-Povidone (REFRESH OP) Apply to eye    pravastatin (PRAVACHOL) 20 mg, Oral, DAILY    rivaroxaban (XARELTO) 20 mg, Oral, DAILY WITH DINNER    ZOLEDRONIC ACID IV IntraVENous, EVERY 6 MONTHS        Vital Signs:   BP (!) 148/72 (Site: Left Upper Arm, Position: Sitting)   Pulse 89   Ht 1.54 m (5' 0.63\")   Wt 61.2 kg (135 lb)   SpO2 99%   BMI 25.82 kg/m²     Physical Exam:  Constitutional: Patient appears frail, not in acute distress   H/N: Conjunctivae are normal, anicteric sclerae. Extra-occular movements intact. No carotid bruits.  Cardiovascular: Radial pulses intact. Regular rhythm, S1 normal and S2 normal. There is a 2/6 systolic murmur best heart at RUSB  Pulmonary/Chest: Respiratory effort normal at rest and breath sounds normal, no wheezes, rales, or crackles.  Abdomen: Soft, non-distended, non-tender, non-obese.   Musculoskeletal: No edema to BLE extremities.  Neurological: Alert and oriented to person, place, and time. Normal motor skills, gait normal.  Skin: Warm and dry, no bruising.     Body mass index is 25.82 kg/m².   Body

## 2024-03-15 ENCOUNTER — TELEPHONE (OUTPATIENT)
Dept: CARDIOLOGY CLINIC | Age: 84
End: 2024-03-15

## 2024-03-28 DIAGNOSIS — I10 ESSENTIAL HYPERTENSION: ICD-10-CM

## 2024-03-28 RX ORDER — HYDROCHLOROTHIAZIDE 25 MG/1
25 TABLET ORAL EVERY MORNING
Qty: 90 TABLET | Refills: 3 | Status: SHIPPED | OUTPATIENT
Start: 2024-03-28

## 2024-04-01 NOTE — PROGRESS NOTES
[facility]  H+P  Consult  OP Visit  FU Visit   CC/HPI   CC New patient visit for TAVR PAT.   Intervention None   General Doing fair.  Concerned with worsening sob.     Cardiac Sx -CP, -dizziness, -syncope, -edema, -orthopnea, -pnd, -fatigue, +SOB   HISTORY/ALLERGY/ROS   M/S/S/F Hx Reviewed in Epic and updated as appropriate   ALLERG Iodinated contrast media and Cortisone   ROS Full ROS obtained and negative except as mentioned in HPI   MEDICATIONS   Cardiac medications reviewed in Epic during visit with patient.   PHYSICAL EXAM   Vitals /74 (Site: Left Upper Arm, Position: Sitting, Cuff Size: Medium Adult)   Pulse 55   Ht 1.524 m (5')   Wt 61.6 kg (135 lb 12.8 oz)   SpO2 100%   BMI 26.52 kg/m²    Gen Alert, coop, no distress Heart  RRR, 3/6   Lung CTA-B, unlabored, no DTP Extrem Edema -Grade 0 (0mm)      COMPLIANCE   Discussed and counseled on diet, exercise, weight loss, smoking, alcohol, drugs.  All questions answered.   CODING   SCI (13659) - 30-39 mins spent reviewing hx/tests/consults, performing exam, counseling/educating, ordering meds/tests/procedures, referring/communicating w/PCP/consultants, documenting in EMR, interpreting results, communicating medical information and plan with family.   SCRIBE ATTESTATION   Nurse I, Cheryl Hsieh, RN, am scribing for and in the presence of Adams Plasencia MD. 4/1/2024 12:36 PM EDT   Doctor Cheryl Hsieh is working as scribe for and in presence of me and may have prepopulated components of note with my historical intellectual property (IP) under my supervision.  Any additions to this IP performed in my presence and at my direction. Content and accuracy of this note reviewed by me (Adams Plasencia MD).   NEW MEDICATIONS   Pt verbalizes understanding of the need for treatment and education provided at today's visit.  Additional education provided in AVS.   ASSESSMENT AND PLAN   *AS    Date EF Detail   Sx     As above   CHF Type   Chronic diastolic   NYHA

## 2024-04-04 ENCOUNTER — ANESTHESIA EVENT (OUTPATIENT)
Dept: OPERATING ROOM | Age: 84
DRG: 267 | End: 2024-04-04
Payer: MEDICARE

## 2024-04-04 ENCOUNTER — OFFICE VISIT (OUTPATIENT)
Dept: CARDIOLOGY CLINIC | Age: 84
End: 2024-04-04
Payer: MEDICARE

## 2024-04-04 ENCOUNTER — HOSPITAL ENCOUNTER (OUTPATIENT)
Dept: PREADMISSION TESTING | Age: 84
Discharge: HOME | End: 2024-04-04
Payer: MEDICARE

## 2024-04-04 VITALS
DIASTOLIC BLOOD PRESSURE: 59 MMHG | WEIGHT: 135.78 LBS | OXYGEN SATURATION: 99 % | RESPIRATION RATE: 16 BRPM | HEART RATE: 81 BPM | SYSTOLIC BLOOD PRESSURE: 147 MMHG | TEMPERATURE: 98.4 F | HEIGHT: 60 IN | BODY MASS INDEX: 26.66 KG/M2

## 2024-04-04 VITALS
DIASTOLIC BLOOD PRESSURE: 74 MMHG | BODY MASS INDEX: 26.66 KG/M2 | SYSTOLIC BLOOD PRESSURE: 130 MMHG | OXYGEN SATURATION: 100 % | HEIGHT: 60 IN | WEIGHT: 135.8 LBS | HEART RATE: 55 BPM

## 2024-04-04 DIAGNOSIS — I35.0 AORTIC VALVE STENOSIS, NONRHEUMATIC: Primary | ICD-10-CM

## 2024-04-04 DIAGNOSIS — I82.402 DEEP VEIN THROMBOSIS (DVT) OF LEFT LOWER EXTREMITY, UNSPECIFIED CHRONICITY, UNSPECIFIED VEIN (HCC): ICD-10-CM

## 2024-04-04 DIAGNOSIS — I10 ESSENTIAL HYPERTENSION: ICD-10-CM

## 2024-04-04 DIAGNOSIS — E78.00 HYPERCHOLESTEROLEMIA: ICD-10-CM

## 2024-04-04 LAB
ABO + RH BLD: NORMAL
ALBUMIN SERPL-MCNC: 4.6 G/DL (ref 3.4–5)
ALP SERPL-CCNC: 87 U/L (ref 40–129)
ALT SERPL-CCNC: 23 U/L (ref 10–40)
ANION GAP SERPL CALCULATED.3IONS-SCNC: 13 MMOL/L (ref 3–16)
ANTIBODY SCREEN: NORMAL
AST SERPL-CCNC: 30 U/L (ref 15–37)
BACTERIA URNS QL MICRO: NORMAL /HPF
BASOPHILS # BLD: 0.1 K/UL (ref 0–0.2)
BASOPHILS NFR BLD: 0.9 %
BILIRUB DIRECT SERPL-MCNC: <0.2 MG/DL (ref 0–0.3)
BILIRUB INDIRECT SERPL-MCNC: NORMAL MG/DL (ref 0–1)
BILIRUB SERPL-MCNC: 0.4 MG/DL (ref 0–1)
BILIRUB UR QL STRIP.AUTO: NEGATIVE
BUN SERPL-MCNC: 15 MG/DL (ref 7–20)
CALCIUM SERPL-MCNC: 10.5 MG/DL (ref 8.3–10.6)
CHLORIDE SERPL-SCNC: 99 MMOL/L (ref 99–110)
CLARITY UR: CLEAR
CO2 SERPL-SCNC: 27 MMOL/L (ref 21–32)
COLOR UR: YELLOW
CREAT SERPL-MCNC: 0.7 MG/DL (ref 0.6–1.2)
DEPRECATED RDW RBC AUTO: 14.2 % (ref 12.4–15.4)
EOSINOPHIL # BLD: 0 K/UL (ref 0–0.6)
EOSINOPHIL NFR BLD: 0.5 %
EPI CELLS #/AREA URNS AUTO: 1 /HPF (ref 0–5)
GFR SERPLBLD CREATININE-BSD FMLA CKD-EPI: 85 ML/MIN/{1.73_M2}
GLUCOSE SERPL-MCNC: 111 MG/DL (ref 70–99)
GLUCOSE UR STRIP.AUTO-MCNC: NEGATIVE MG/DL
HCT VFR BLD AUTO: 41.7 % (ref 36–48)
HGB BLD-MCNC: 14.1 G/DL (ref 12–16)
HGB UR QL STRIP.AUTO: ABNORMAL
HYALINE CASTS #/AREA URNS AUTO: 0 /LPF (ref 0–8)
INR PPP: 1.26 (ref 0.85–1.15)
KETONES UR STRIP.AUTO-MCNC: NEGATIVE MG/DL
LEUKOCYTE ESTERASE UR QL STRIP.AUTO: ABNORMAL
LYMPHOCYTES # BLD: 0.8 K/UL (ref 1–5.1)
LYMPHOCYTES NFR BLD: 11.6 %
MAGNESIUM SERPL-MCNC: 2.1 MG/DL (ref 1.8–2.4)
MCH RBC QN AUTO: 32.5 PG (ref 26–34)
MCHC RBC AUTO-ENTMCNC: 33.8 G/DL (ref 31–36)
MCV RBC AUTO: 96.2 FL (ref 80–100)
MONOCYTES # BLD: 0.6 K/UL (ref 0–1.3)
MONOCYTES NFR BLD: 8.4 %
NEUTROPHILS # BLD: 5.4 K/UL (ref 1.7–7.7)
NEUTROPHILS NFR BLD: 78.6 %
NITRITE UR QL STRIP.AUTO: NEGATIVE
PH UR STRIP.AUTO: 6.5 [PH] (ref 5–8)
PHOSPHATE SERPL-MCNC: 3.1 MG/DL (ref 2.5–4.9)
PLATELET # BLD AUTO: 261 K/UL (ref 135–450)
PMV BLD AUTO: 8.3 FL (ref 5–10.5)
POTASSIUM SERPL-SCNC: 3.7 MMOL/L (ref 3.5–5.1)
PROT SERPL-MCNC: 8 G/DL (ref 6.4–8.2)
PROT UR STRIP.AUTO-MCNC: NEGATIVE MG/DL
PROTHROMBIN TIME: 16 SEC (ref 11.9–14.9)
RBC # BLD AUTO: 4.33 M/UL (ref 4–5.2)
RBC CLUMPS #/AREA URNS AUTO: 2 /HPF (ref 0–4)
SODIUM SERPL-SCNC: 139 MMOL/L (ref 136–145)
SP GR UR STRIP.AUTO: <=1.005 (ref 1–1.03)
UA DIPSTICK W REFLEX MICRO PNL UR: YES
URN SPEC COLLECT METH UR: ABNORMAL
UROBILINOGEN UR STRIP-ACNC: 0.2 E.U./DL
WBC # BLD AUTO: 6.9 K/UL (ref 4–11)
WBC #/AREA URNS AUTO: 3 /HPF (ref 0–5)

## 2024-04-04 PROCEDURE — 86850 RBC ANTIBODY SCREEN: CPT

## 2024-04-04 PROCEDURE — 80069 RENAL FUNCTION PANEL: CPT

## 2024-04-04 PROCEDURE — 86901 BLOOD TYPING SEROLOGIC RH(D): CPT

## 2024-04-04 PROCEDURE — 99214 OFFICE O/P EST MOD 30 MIN: CPT | Performed by: INTERNAL MEDICINE

## 2024-04-04 PROCEDURE — 1123F ACP DISCUSS/DSCN MKR DOCD: CPT | Performed by: INTERNAL MEDICINE

## 2024-04-04 PROCEDURE — 81001 URINALYSIS AUTO W/SCOPE: CPT

## 2024-04-04 PROCEDURE — 85025 COMPLETE CBC W/AUTO DIFF WBC: CPT

## 2024-04-04 PROCEDURE — 87086 URINE CULTURE/COLONY COUNT: CPT

## 2024-04-04 PROCEDURE — 80076 HEPATIC FUNCTION PANEL: CPT

## 2024-04-04 PROCEDURE — 85610 PROTHROMBIN TIME: CPT

## 2024-04-04 PROCEDURE — 36415 COLL VENOUS BLD VENIPUNCTURE: CPT

## 2024-04-04 PROCEDURE — 3075F SYST BP GE 130 - 139MM HG: CPT | Performed by: INTERNAL MEDICINE

## 2024-04-04 PROCEDURE — 83735 ASSAY OF MAGNESIUM: CPT

## 2024-04-04 PROCEDURE — 86900 BLOOD TYPING SEROLOGIC ABO: CPT

## 2024-04-04 PROCEDURE — 3078F DIAST BP <80 MM HG: CPT | Performed by: INTERNAL MEDICINE

## 2024-04-04 ASSESSMENT — PAIN SCALES - GENERAL: PAINLEVEL_OUTOF10: 0

## 2024-04-04 NOTE — ANESTHESIA PRE PROCEDURE
visualization due to poor acoustical window.     Indications:Aortic stenosis.     Patient Status: Routine     Height: 61 inches Weight: 139 pounds BSA: 1.62 m2 BMI: 26.26 kg/m2     Rhythm: Within normal limits HR: 95 bpm BP: 137/77 mmHg      Conclusions      Summary   Definity contrast was administered to assess the aortic valve velocities and   pressure gradients.   Left ventricular cavity size is normal.   There is mildly increased left ventricular wall thickness.      Overall left ventricular systolic function appears hyperdynamic with an   associated mid ventricular gradient.   Ejection fraction is visually estimated to be 65-70%.      No regional wall motion abnormalities are noted.   Grade I diastolic dysfunction with normal LV filling pressures.   The aortic valve leaflets are moderate to severely calcified with restricted   mobility of the left and non-coronary cusp.      Severe aortic stenosis with a peak velocity of 4.15m/s and a peak/mean   pressure gradient of 69mmHg/42mmHg.   No evidence of aortic valve regurgitation.      Signature       Neuro/Psych:   (+) psychiatric history:            GI/Hepatic/Renal:             Endo/Other:                     Abdominal: normal exam            Vascular:   + PE.       Other Findings:           Anesthesia Plan      MAC     ASA 4       Induction: intravenous.  continuous noninvasive hemodynamic monitor  MIPS: Postoperative opioids intended.  Anesthetic plan and risks discussed with patient.    Use of blood products discussed with patient whom consented to blood products.      Attending anesthesiologist reviewed and agrees with Preprocedure content            Doroteo Vega MD   4/4/2024

## 2024-04-04 NOTE — PROGRESS NOTES
Pt here for PAT visit.  Consents for procedure and blood signed by pt and in chart.  Labs drawn and urine collected and sent to lab for testing as ordered.  Pt seen by Dr. Vega from Anesthesia and questions answered.  Vitals stable and documented in flowsheet.  Pt instructed to be NPO after midnight prior to procedure and states understanding.  Pt provided with hibiclens and instructed to shower with entire bottle the night prior to procedure.  Pt instructed to take the following medications the morning of procedure with a small sip of water: NA.  Pt instructed to stop taking Xarelto prior to procedure on 4/6/24 and states understanding.   EKG completed and results in chart.  Pt instructed to arrive to the hospital the morning of procedure at 1030 on 4/9/24.  Pt in good condition and okay for discharge.  Pt denies further questions at time of discharge.  Pt instructed to call Dr. Plasencia's office with any medical concerns or the heart office at [phone] with any further questions between now and day of procedure.

## 2024-04-05 LAB — BACTERIA UR CULT: NORMAL

## 2024-04-09 ENCOUNTER — HOSPITAL ENCOUNTER (INPATIENT)
Age: 84
LOS: 1 days | Discharge: HOME | DRG: 267 | End: 2024-04-10
Attending: INTERNAL MEDICINE | Admitting: INTERNAL MEDICINE
Payer: MEDICARE

## 2024-04-09 ENCOUNTER — HOSPITAL ENCOUNTER (OUTPATIENT)
Dept: CARDIAC CATH/INVASIVE PROCEDURES | Age: 84
Setting detail: SURGERY ADMIT
Discharge: HOME | DRG: 267 | End: 2024-04-09
Payer: MEDICARE

## 2024-04-09 ENCOUNTER — ANESTHESIA (OUTPATIENT)
Dept: OPERATING ROOM | Age: 84
DRG: 267 | End: 2024-04-09
Payer: MEDICARE

## 2024-04-09 PROBLEM — I35.0 AORTIC STENOSIS, SEVERE: Status: ACTIVE | Noted: 2024-04-09

## 2024-04-09 LAB
ABO + RH BLD: NORMAL
ACTIVATED CLOTTING TIME: 354 SEC (ref 99–130)
ACTIVATED CLOTTING TIME: 95 SEC (ref 99–130)
BASE EXCESS BLDA CALC-SCNC: 6 MMOL/L (ref -3–3)
BLD GP AB SCN SERPL QL: NORMAL
BLOOD BANK DISPENSE STATUS: NORMAL
BLOOD BANK PRODUCT CODE: NORMAL
BPU ID: NORMAL
CA-I BLD-SCNC: 1.34 MMOL/L (ref 1.12–1.32)
CO2 BLDA-SCNC: 32 MMOL/L
DESCRIPTION BLOOD BANK: NORMAL
GLUCOSE BLD-MCNC: 92 MG/DL (ref 70–99)
HCO3 BLDA-SCNC: 30.8 MMOL/L (ref 21–29)
HCT VFR BLD AUTO: 32 % (ref 36–48)
HGB BLD CALC-MCNC: 10.8 GM/DL (ref 12–16)
LACTATE BLD-SCNC: <0.3 MMOL/L (ref 0.4–2)
NT-PROBNP SERPL-MCNC: 193 PG/ML (ref 0–449)
PCO2 BLDA: 49 MM HG (ref 35–45)
PERFORMED ON: ABNORMAL
PH BLDA: 7.41 [PH] (ref 7.35–7.45)
PO2 BLDA: 424.4 MM HG (ref 75–108)
POC SAMPLE TYPE: ABNORMAL
POTASSIUM BLD-SCNC: 3.2 MMOL/L (ref 3.5–5.1)
SAO2 % BLDA: 100 % (ref 93–100)
SODIUM BLD-SCNC: 141 MMOL/L (ref 136–145)
TROPONIN, HIGH SENSITIVITY: 8 NG/L (ref 0–14)

## 2024-04-09 PROCEDURE — C1760 CLOSURE DEV, VASC: HCPCS

## 2024-04-09 PROCEDURE — 84295 ASSAY OF SERUM SODIUM: CPT

## 2024-04-09 PROCEDURE — 2060000000 HC ICU INTERMEDIATE R&B

## 2024-04-09 PROCEDURE — 51702 INSERT TEMP BLADDER CATH: CPT

## 2024-04-09 PROCEDURE — 85014 HEMATOCRIT: CPT

## 2024-04-09 PROCEDURE — 93005 ELECTROCARDIOGRAM TRACING: CPT | Performed by: INTERNAL MEDICINE

## 2024-04-09 PROCEDURE — 86850 RBC ANTIBODY SCREEN: CPT

## 2024-04-09 PROCEDURE — C1889 IMPLANT/INSERT DEVICE, NOC: HCPCS

## 2024-04-09 PROCEDURE — 94150 VITAL CAPACITY TEST: CPT

## 2024-04-09 PROCEDURE — 82803 BLOOD GASES ANY COMBINATION: CPT

## 2024-04-09 PROCEDURE — 83605 ASSAY OF LACTIC ACID: CPT

## 2024-04-09 PROCEDURE — 82947 ASSAY GLUCOSE BLOOD QUANT: CPT

## 2024-04-09 PROCEDURE — C1894 INTRO/SHEATH, NON-LASER: HCPCS

## 2024-04-09 PROCEDURE — 2709999900 HC NON-CHARGEABLE SUPPLY

## 2024-04-09 PROCEDURE — 84132 ASSAY OF SERUM POTASSIUM: CPT

## 2024-04-09 PROCEDURE — 94760 N-INVAS EAR/PLS OXIMETRY 1: CPT

## 2024-04-09 PROCEDURE — 3700000000 HC ANESTHESIA ATTENDED CARE: Performed by: INTERNAL MEDICINE

## 2024-04-09 PROCEDURE — 6360000002 HC RX W HCPCS: Performed by: INTERNAL MEDICINE

## 2024-04-09 PROCEDURE — 2580000003 HC RX 258: Performed by: INTERNAL MEDICINE

## 2024-04-09 PROCEDURE — 3600000017 HC SURGERY HYBRID ADDL 15MIN

## 2024-04-09 PROCEDURE — 85347 COAGULATION TIME ACTIVATED: CPT

## 2024-04-09 PROCEDURE — 6360000004 HC RX CONTRAST MEDICATION: Performed by: INTERNAL MEDICINE

## 2024-04-09 PROCEDURE — 3600000007 HC SURGERY HYBRID BASE

## 2024-04-09 PROCEDURE — 86923 COMPATIBILITY TEST ELECTRIC: CPT

## 2024-04-09 PROCEDURE — 7100000010 HC PHASE II RECOVERY - FIRST 15 MIN

## 2024-04-09 PROCEDURE — 6370000000 HC RX 637 (ALT 250 FOR IP): Performed by: INTERNAL MEDICINE

## 2024-04-09 PROCEDURE — 86900 BLOOD TYPING SEROLOGIC ABO: CPT

## 2024-04-09 PROCEDURE — 33361 REPLACE AORTIC VALVE PERQ: CPT | Performed by: STUDENT IN AN ORGANIZED HEALTH CARE EDUCATION/TRAINING PROGRAM

## 2024-04-09 PROCEDURE — 84484 ASSAY OF TROPONIN QUANT: CPT

## 2024-04-09 PROCEDURE — 33361 REPLACE AORTIC VALVE PERQ: CPT | Performed by: INTERNAL MEDICINE

## 2024-04-09 PROCEDURE — 02RF38Z REPLACEMENT OF AORTIC VALVE WITH ZOOPLASTIC TISSUE, PERCUTANEOUS APPROACH: ICD-10-PCS | Performed by: INTERNAL MEDICINE

## 2024-04-09 PROCEDURE — 82330 ASSAY OF CALCIUM: CPT

## 2024-04-09 PROCEDURE — 6360000002 HC RX W HCPCS: Performed by: ANESTHESIOLOGY

## 2024-04-09 PROCEDURE — 36415 COLL VENOUS BLD VENIPUNCTURE: CPT

## 2024-04-09 PROCEDURE — 2500000003 HC RX 250 WO HCPCS: Performed by: ANESTHESIOLOGY

## 2024-04-09 PROCEDURE — 3700000001 HC ADD 15 MINUTES (ANESTHESIA): Performed by: INTERNAL MEDICINE

## 2024-04-09 PROCEDURE — 2709999900 HC NON-CHARGEABLE SUPPLY: Performed by: INTERNAL MEDICINE

## 2024-04-09 PROCEDURE — 2580000003 HC RX 258: Performed by: ANESTHESIOLOGY

## 2024-04-09 PROCEDURE — C1769 GUIDE WIRE: HCPCS

## 2024-04-09 PROCEDURE — 83880 ASSAY OF NATRIURETIC PEPTIDE: CPT

## 2024-04-09 PROCEDURE — 7100000011 HC PHASE II RECOVERY - ADDTL 15 MIN

## 2024-04-09 PROCEDURE — 86901 BLOOD TYPING SEROLOGIC RH(D): CPT

## 2024-04-09 RX ORDER — LIDOCAINE 4 G/G
1 PATCH TOPICAL ONCE
Status: COMPLETED | OUTPATIENT
Start: 2024-04-09 | End: 2024-04-10

## 2024-04-09 RX ORDER — HYDRALAZINE HYDROCHLORIDE 20 MG/ML
10 INJECTION INTRAMUSCULAR; INTRAVENOUS EVERY 6 HOURS PRN
Status: DISCONTINUED | OUTPATIENT
Start: 2024-04-09 | End: 2024-04-10 | Stop reason: HOSPADM

## 2024-04-09 RX ORDER — SODIUM CHLORIDE 9 MG/ML
INJECTION, SOLUTION INTRAVENOUS CONTINUOUS PRN
Status: DISCONTINUED | OUTPATIENT
Start: 2024-04-09 | End: 2024-04-09 | Stop reason: SDUPTHER

## 2024-04-09 RX ORDER — SODIUM CHLORIDE 0.9 % (FLUSH) 0.9 %
5-40 SYRINGE (ML) INJECTION PRN
Status: DISCONTINUED | OUTPATIENT
Start: 2024-04-09 | End: 2024-04-10 | Stop reason: HOSPADM

## 2024-04-09 RX ORDER — ATROPINE SULFATE 1 MG/ML
0.5 INJECTION, SOLUTION INTRAVENOUS
Status: ACTIVE | OUTPATIENT
Start: 2024-04-09 | End: 2024-04-10

## 2024-04-09 RX ORDER — HEPARIN SODIUM 1000 [USP'U]/ML
INJECTION, SOLUTION INTRAVENOUS; SUBCUTANEOUS PRN
Status: DISCONTINUED | OUTPATIENT
Start: 2024-04-09 | End: 2024-04-09 | Stop reason: SDUPTHER

## 2024-04-09 RX ORDER — ASPIRIN 81 MG/1
81 TABLET ORAL DAILY
Status: DISCONTINUED | OUTPATIENT
Start: 2024-04-09 | End: 2024-04-10 | Stop reason: HOSPADM

## 2024-04-09 RX ORDER — ANASTROZOLE 1 MG/1
1 TABLET ORAL DAILY
Status: DISCONTINUED | OUTPATIENT
Start: 2024-04-09 | End: 2024-04-10 | Stop reason: HOSPADM

## 2024-04-09 RX ORDER — DEXMEDETOMIDINE HYDROCHLORIDE 100 UG/ML
INJECTION, SOLUTION INTRAVENOUS PRN
Status: DISCONTINUED | OUTPATIENT
Start: 2024-04-09 | End: 2024-04-09 | Stop reason: SDUPTHER

## 2024-04-09 RX ORDER — DIPHENHYDRAMINE HCL 25 MG
50 TABLET ORAL ONCE
Status: COMPLETED | OUTPATIENT
Start: 2024-04-09 | End: 2024-04-09

## 2024-04-09 RX ORDER — CLOPIDOGREL BISULFATE 75 MG/1
75 TABLET ORAL DAILY
Status: DISCONTINUED | OUTPATIENT
Start: 2024-04-09 | End: 2024-04-10 | Stop reason: HOSPADM

## 2024-04-09 RX ORDER — 0.9 % SODIUM CHLORIDE 0.9 %
500 INTRAVENOUS SOLUTION INTRAVENOUS PRN
Status: DISCONTINUED | OUTPATIENT
Start: 2024-04-09 | End: 2024-04-10 | Stop reason: HOSPADM

## 2024-04-09 RX ORDER — SODIUM CHLORIDE 9 MG/ML
INJECTION, SOLUTION INTRAVENOUS PRN
Status: DISCONTINUED | OUTPATIENT
Start: 2024-04-09 | End: 2024-04-10 | Stop reason: HOSPADM

## 2024-04-09 RX ORDER — ACETAMINOPHEN 325 MG/1
650 TABLET ORAL EVERY 4 HOURS PRN
Status: DISCONTINUED | OUTPATIENT
Start: 2024-04-09 | End: 2024-04-10 | Stop reason: HOSPADM

## 2024-04-09 RX ORDER — PROTAMINE SULFATE 10 MG/ML
INJECTION, SOLUTION INTRAVENOUS PRN
Status: DISCONTINUED | OUTPATIENT
Start: 2024-04-09 | End: 2024-04-09 | Stop reason: SDUPTHER

## 2024-04-09 RX ORDER — DOCUSATE SODIUM 100 MG/1
100 CAPSULE, LIQUID FILLED ORAL DAILY
Status: DISCONTINUED | OUTPATIENT
Start: 2024-04-09 | End: 2024-04-10 | Stop reason: HOSPADM

## 2024-04-09 RX ORDER — NITROGLYCERIN 20 MG/100ML
5 INJECTION INTRAVENOUS CONTINUOUS
Status: DISCONTINUED | OUTPATIENT
Start: 2024-04-09 | End: 2024-04-10 | Stop reason: HOSPADM

## 2024-04-09 RX ORDER — SODIUM CHLORIDE 0.9 % (FLUSH) 0.9 %
5-40 SYRINGE (ML) INJECTION EVERY 12 HOURS SCHEDULED
Status: DISCONTINUED | OUTPATIENT
Start: 2024-04-09 | End: 2024-04-10 | Stop reason: HOSPADM

## 2024-04-09 RX ORDER — PROPOFOL 10 MG/ML
INJECTION, EMULSION INTRAVENOUS CONTINUOUS PRN
Status: DISCONTINUED | OUTPATIENT
Start: 2024-04-09 | End: 2024-04-09 | Stop reason: SDUPTHER

## 2024-04-09 RX ORDER — LOSARTAN POTASSIUM 25 MG/1
50 TABLET ORAL DAILY
Status: DISCONTINUED | OUTPATIENT
Start: 2024-04-09 | End: 2024-04-10 | Stop reason: HOSPADM

## 2024-04-09 RX ORDER — HYDROCHLOROTHIAZIDE 25 MG/1
25 TABLET ORAL EVERY MORNING
Status: DISCONTINUED | OUTPATIENT
Start: 2024-04-09 | End: 2024-04-10 | Stop reason: HOSPADM

## 2024-04-09 RX ORDER — ONDANSETRON 2 MG/ML
4 INJECTION INTRAMUSCULAR; INTRAVENOUS EVERY 6 HOURS PRN
Status: DISCONTINUED | OUTPATIENT
Start: 2024-04-09 | End: 2024-04-10 | Stop reason: HOSPADM

## 2024-04-09 RX ADMIN — DOCUSATE SODIUM 100 MG: 100 CAPSULE, LIQUID FILLED ORAL at 16:35

## 2024-04-09 RX ADMIN — PHENYLEPHRINE HYDROCHLORIDE 50 MCG/MIN: 10 INJECTION INTRAVENOUS at 12:30

## 2024-04-09 RX ADMIN — HEPARIN SODIUM 10000 UNITS: 1000 INJECTION INTRAVENOUS; SUBCUTANEOUS at 12:57

## 2024-04-09 RX ADMIN — LOSARTAN POTASSIUM 50 MG: 25 TABLET, FILM COATED ORAL at 16:35

## 2024-04-09 RX ADMIN — DEXMEDETOMIDINE HYDROCHLORIDE 10 MCG: 100 INJECTION, SOLUTION INTRAVENOUS at 12:06

## 2024-04-09 RX ADMIN — PROPOFOL 75 MCG/KG/MIN: 10 INJECTION, EMULSION INTRAVENOUS at 12:06

## 2024-04-09 RX ADMIN — Medication 10 ML: at 23:38

## 2024-04-09 RX ADMIN — HYDROCHLOROTHIAZIDE 25 MG: 25 TABLET ORAL at 16:35

## 2024-04-09 RX ADMIN — PROTAMINE SULFATE 50 MG: 10 INJECTION, SOLUTION INTRAVENOUS at 13:16

## 2024-04-09 RX ADMIN — CEFAZOLIN 2000 MG: 2 INJECTION, POWDER, FOR SOLUTION INTRAMUSCULAR; INTRAVENOUS at 12:06

## 2024-04-09 RX ADMIN — PROTAMINE SULFATE 50 MG: 10 INJECTION, SOLUTION INTRAVENOUS at 13:14

## 2024-04-09 RX ADMIN — IOPAMIDOL 102 ML: 755 INJECTION, SOLUTION INTRAVENOUS at 13:15

## 2024-04-09 RX ADMIN — SODIUM CHLORIDE: 9 INJECTION, SOLUTION INTRAVENOUS at 12:00

## 2024-04-09 RX ADMIN — DIPHENHYDRAMINE HYDROCHLORIDE 50 MG: 25 TABLET ORAL at 10:52

## 2024-04-09 RX ADMIN — ASPIRIN 81 MG: 81 TABLET, COATED ORAL at 18:19

## 2024-04-09 RX ADMIN — CLOPIDOGREL BISULFATE 75 MG: 75 TABLET ORAL at 18:19

## 2024-04-09 ASSESSMENT — PAIN SCALES - GENERAL
PAINLEVEL_OUTOF10: 3
PAINLEVEL_OUTOF10: 0

## 2024-04-09 NOTE — PROCEDURES
[facility]  TAVR Operative Note     PROCEDURE SUMMARY   Procedure Replacement of aortic valve with zooplastic tissue, percutaneous ([id number])   Valve Type Martini Mariah 3 Ultra 23mm with 0additional cc of volume.   Operators Cody Garner MD (IC)   Indication Nonrheumatic aortic valve stenosis (I35.0)   Consent Obtained, witnessed, documented in chart.                   EBL <50mL   Complications None   Specimens None   Bleed Risk Low   Sedation Sedation provided entirely by anesthesia.  See record for details.   TTE Performed preprocedure, intraprocedure and postprocedure.    Access RCFA, LCFA, RCFV   Preclose Delivery side artery preclosed with 2 perclose devices   TVP Inserted via CFV into RV and threshold obtained and acceptable.  Removed after valve implantation.   Angiography Pigtail inserted through CFA into ascending aorta over wire   Delivery Sheath J wire advanced into descending Ao.  JR4 advanced over wire into descending Ao.  Lunderquist wire advanced into descending Ao through JR4.  Delivery sheath advanced over wire into descending aorta.     AV Crossing AL1 and J/Straight wire combo used to cross AV.  Amplatz extra stiff advanced through AL1 and curled in LV apex.   BAV BAV not performed.   Valve Deploy TAVR valve prepped by certified Martini Rep and advanced through the delivery sheath to the level of the aortic annulus.  Valve localized under echo and fluoro guidance.  Rapid pacing employed and valve deployed to profile for 4s.  Balloon deflated and withdrawn into delivery sheath.  Rapid pacing aborted.  TTE reviewed for complications.   Postdilation Post dilation was not performed   PostProcedure Wires removed and delivery catheter withdrawn.  Delivery sheath removed and perclose sutures tied with hemostasis.  Transitioned to postop/CVU.      CHF STATUS   Symptoms Onset: Onset: recent  Duration: weeks  Temporal: Worsening   CHF Type Chronic diastolic   NYHA III   [place] Major:  n/a

## 2024-04-09 NOTE — PROGRESS NOTES
Pt verified information regarding surgery, name, birth date, surgeon, procedure and allergies.. Patient admitted to [facility] for surgery. Appropriate antibiotics ordered: Ancef . Beta blocker: NA . DVT prophyaxis: CAMILA's and SCD's Lab work within normal limits, 4 units of RBC's on call to OR, vital signs stable, Mepilex sacral border applied and 2% chlorhexidine gluconate skin prep given. Patient and daughter educated about surgery and pain management. To cath lab per cart @ [id number]..

## 2024-04-09 NOTE — FLOWSHEET NOTE
Patient admitted to room 5907 from cath lab. Patient oriented to room, call light, bed rails, phone, lights and bathroom. Bed alarm in place, patient aware of placement and reason. Telemetry box [id number] in place, patient aware of placement and reason. Bed locked, in lowest position, side rails up 2/4, call light within reach. Post TAVR restrictions discussed with patient and daughter who is at bedside. Encouraged patient to call with any needs.   04/09/24 1517   Vitals   Temp 97.9 °F (36.6 °C)   Temp Source Temporal   Pulse 82   Heart Rate Source Telemetry   Respirations 18   BP (!) 148/65   MAP (Calculated) 93   MAP (mmHg) 90   BP Location Left upper arm   BP Upper/Lower Upper   BP Method Automatic   Patient Position Supine   Oxygen Therapy   SpO2 95 %   Pulse Oximetry Type Continuous   SPO2 Low Alarm Limit POX 89   Pulse Oximeter Device Mode Continuous   Pulse Oximeter Device Location Right;Finger   O2 Device None (Room air)   RLE Neurovascular Assessment   Capillary Refill Less than/Equal to 3 seconds   Color Appropriate for Ethnicity   Temperature Warm   RLE Sensation  Full sensation;No numbness;No pain;No tingling   R Pedal Pulse +2   LLE Neurovascular Assessment   Capillary Refill Less than/Equal to 3 seconds   Color Appropriate for Ethnicity   Temperature Warm   LLE Sensation  Full sensation;No numbness;No pain;No tingling   L Pedal Pulse +2   Puncture Site Assessment 1   Location Femoral - left   Site Assessment No redness, drainage, swelling or hematoma  (dressing changed by cath lab RN at bedside)   Hemostasis Intervention Closure device   Dressing Applied Transparent occlusive dressing   Multiple puncture sites Yes   Location 2 Femoral-right   Site Assessment 2 No redness,drainage,swelling or hematoma    Dressing Applied 2 Transparent occlusive   Location 3 Femoral-right   Site Assessment 3 No redness,drainage,swelling or hematoma   Dressing Applied 3 Transparent occlusive

## 2024-04-09 NOTE — PROGRESS NOTES
Pt. Sheath removed from Right  Femoral  Vein. Manual pressure held for 20 minutes. No bleeding, hematoma, or other complications noted. Quick clot and dressing applied. Pulses unchanged.

## 2024-04-09 NOTE — PROGRESS NOTES
Pt arrived to pre/post from TAVR procedure. Pt a/ox4, EKG obtained. Neuro check WNL. Pulses palpable. Groin sites WNL. Pt unable to urinate using bedpan, Cervantes catheter inserted using sterile technique. Will continue to monitor.

## 2024-04-09 NOTE — PROGRESS NOTES
Pt. Sheath removed from Right  Femoral  Artery. Manual pressure held for 20 minutes. No bleeding, hematoma, or other complications noted. Quick clot and dressing applied. Pulses unchanged.

## 2024-04-09 NOTE — OP NOTE
Operative Note      Patient: Bisi Colindres  YOB: 1940  MRN: [id number]    Date of Procedure: 4/9/2024    Pre-Op Diagnosis Codes:     * Aortic valve stenosis, etiology of cardiac valve disease unspecified [I35.0]    Post-Op Diagnosis: Same       Procedure(s):  TRANSCATHETER AORTIC VALVE REPLACEMENT FEMORAL APPROACH  TRANSCATHETER AORTIC VALVE REPLACEMENT FEMORAL APPROACH    Surgeon(s):  Adams Plasencia MD Kasten, Michael W, MD    Assistant:   First Assistant: Alejandro Vargas RN    Anesthesia: Monitor Anesthesia Care    Estimated Blood Loss (mL): less than 50     Complications: None    Specimens:   * No specimens in log *    Implants:  * No implants in log *      Drains: * No LDAs found *    Findings:  Infection Present At Time Of Surgery (PATOS) (choose all levels that have infection present):  No infection present  Other Findings: 8/4 gradient no sig pvl sinus    Detailed Description of Procedure:   Procedure performed under MAC.  she was prepped and draped in sterile fashion.  After timeout was performed, the left common femoral artery was accessed under fluoroscopic guidance.  Lidocaine had been injected at both sites.  Catheter advanced followed by sheath which was flushed.  A right femoral vein catheter was inserted without difficulty and flushed using seldinger technique.  A right common femoral artery catheter was placed followed by the pigtail in the aortic root.    A temporary pacing wire was advanced through the venous sheath under fluoroscopic guidance through the IVC into the right ventricle and tested.  2 Perclose devices were placed in the left femoral artery.  Using a Amplatz wire the dry seal sheath was then placed.  Heparin was given with satisfactory result of the ACT. The valve was crossed without difficulty.  Wire exchanged for a stiff wire.  The device was then brought into the descending thoracic aorta and prepared as per device recommendations.  The 23 mm

## 2024-04-09 NOTE — PROGRESS NOTES
Patient c/o mid sternal chest pressure that started 30 minutes ago. Non radiating. Denies chest pain, SOB, dizziness. 12 lead EKG obtained. Dr. Plasencia made aware. Plan to observe the patient. VSS.     Vitals:    04/09/24 1800   BP: (!) 146/56   Pulse: 82   Resp: 18   Temp: 97.9 °F (36.6 °C)   SpO2: 97%

## 2024-04-09 NOTE — ANESTHESIA POSTPROCEDURE EVALUATION
Department of Anesthesiology  Postprocedure Note    Patient: Bisi Colindres  MRN: [id number]  YOB: 1940  Date of evaluation: 4/9/2024    Procedure Summary       Date: 04/09/24 Room / Location: [facility]    Anesthesia Start: 1201 Anesthesia Stop: 1327    Procedures:       TRANSCATHETER AORTIC VALVE REPLACEMENT FEMORAL APPROACH      TRANSCATHETER AORTIC VALVE REPLACEMENT FEMORAL APPROACH Diagnosis:       Aortic valve stenosis, etiology of cardiac valve disease unspecified      (Aortic valve stenosis, etiology of cardiac valve disease unspecified [I35.0])    Surgeons: Adams Plasencia MD; Peter Hanies MD Responsible Provider: Eduin Crowe MD    Anesthesia Type: MAC ASA Status: 4            Anesthesia Type: No value filed.    Aguila Phase I: Aguila Score: 10    Aguila Phase II:      Anesthesia Post Evaluation    Patient location during evaluation: PACU  Patient participation: complete - patient participated  Level of consciousness: awake and alert  Airway patency: patent  Nausea & Vomiting: no nausea and no vomiting  Cardiovascular status: blood pressure returned to baseline  Respiratory status: acceptable  Hydration status: euvolemic  Multimodal analgesia pain management approach  Pain management: adequate    No notable events documented.

## 2024-04-09 NOTE — PROGRESS NOTES
Lt groin site oozing. Dressing saturated. Site soft, no hematoma. Pressure held for 10 minutes. D-Stat on. L pedal pulses +2. Pt denies pain, numbness, or tingling.

## 2024-04-10 ENCOUNTER — TELEPHONE (OUTPATIENT)
Dept: CARDIOLOGY CLINIC | Age: 84
End: 2024-04-10

## 2024-04-10 VITALS
TEMPERATURE: 97.7 F | HEART RATE: 89 BPM | WEIGHT: 135 LBS | OXYGEN SATURATION: 97 % | RESPIRATION RATE: 18 BRPM | BODY MASS INDEX: 26.5 KG/M2 | HEIGHT: 60 IN | SYSTOLIC BLOOD PRESSURE: 96 MMHG | DIASTOLIC BLOOD PRESSURE: 60 MMHG

## 2024-04-10 DIAGNOSIS — Z95.2 S/P TAVR (TRANSCATHETER AORTIC VALVE REPLACEMENT): Primary | ICD-10-CM

## 2024-04-10 LAB
ANION GAP SERPL CALCULATED.3IONS-SCNC: 12 MMOL/L (ref 3–16)
BUN SERPL-MCNC: 15 MG/DL (ref 7–20)
CALCIUM SERPL-MCNC: 9.2 MG/DL (ref 8.3–10.6)
CHLORIDE SERPL-SCNC: 99 MMOL/L (ref 99–110)
CO2 SERPL-SCNC: 26 MMOL/L (ref 21–32)
CREAT SERPL-MCNC: 0.7 MG/DL (ref 0.6–1.2)
DEPRECATED RDW RBC AUTO: 13.7 % (ref 12.4–15.4)
EKG ATRIAL RATE: 86 BPM
EKG ATRIAL RATE: 88 BPM
EKG DIAGNOSIS: NORMAL
EKG DIAGNOSIS: NORMAL
EKG P AXIS: 61 DEGREES
EKG P AXIS: 74 DEGREES
EKG P-R INTERVAL: 156 MS
EKG P-R INTERVAL: 182 MS
EKG Q-T INTERVAL: 370 MS
EKG Q-T INTERVAL: 414 MS
EKG QRS DURATION: 118 MS
EKG QRS DURATION: 72 MS
EKG QTC CALCULATION (BAZETT): 447 MS
EKG QTC CALCULATION (BAZETT): 495 MS
EKG R AXIS: 31 DEGREES
EKG R AXIS: 6 DEGREES
EKG T AXIS: 109 DEGREES
EKG T AXIS: 85 DEGREES
EKG VENTRICULAR RATE: 86 BPM
EKG VENTRICULAR RATE: 88 BPM
GFR SERPLBLD CREATININE-BSD FMLA CKD-EPI: 85 ML/MIN/{1.73_M2}
GLUCOSE SERPL-MCNC: 112 MG/DL (ref 70–99)
HCT VFR BLD AUTO: 34.6 % (ref 36–48)
HGB BLD-MCNC: 11.8 G/DL (ref 12–16)
INR PPP: 1 (ref 0.85–1.15)
MAGNESIUM SERPL-MCNC: 1.6 MG/DL (ref 1.8–2.4)
MCH RBC QN AUTO: 32.4 PG (ref 26–34)
MCHC RBC AUTO-ENTMCNC: 34.2 G/DL (ref 31–36)
MCV RBC AUTO: 94.8 FL (ref 80–100)
PLATELET # BLD AUTO: 163 K/UL (ref 135–450)
PMV BLD AUTO: 8 FL (ref 5–10.5)
POTASSIUM SERPL-SCNC: 3.3 MMOL/L (ref 3.5–5.1)
PROTHROMBIN TIME: 13.4 SEC (ref 11.9–14.9)
RBC # BLD AUTO: 3.65 M/UL (ref 4–5.2)
SODIUM SERPL-SCNC: 137 MMOL/L (ref 136–145)
WBC # BLD AUTO: 8.8 K/UL (ref 4–11)

## 2024-04-10 PROCEDURE — 6370000000 HC RX 637 (ALT 250 FOR IP): Performed by: INTERNAL MEDICINE

## 2024-04-10 PROCEDURE — 93010 ELECTROCARDIOGRAM REPORT: CPT | Performed by: INTERNAL MEDICINE

## 2024-04-10 PROCEDURE — 2580000003 HC RX 258: Performed by: INTERNAL MEDICINE

## 2024-04-10 PROCEDURE — 85610 PROTHROMBIN TIME: CPT

## 2024-04-10 PROCEDURE — 80048 BASIC METABOLIC PNL TOTAL CA: CPT

## 2024-04-10 PROCEDURE — 83735 ASSAY OF MAGNESIUM: CPT

## 2024-04-10 PROCEDURE — 36415 COLL VENOUS BLD VENIPUNCTURE: CPT

## 2024-04-10 PROCEDURE — 93306 TTE W/DOPPLER COMPLETE: CPT

## 2024-04-10 PROCEDURE — 85027 COMPLETE CBC AUTOMATED: CPT

## 2024-04-10 PROCEDURE — 6360000002 HC RX W HCPCS: Performed by: INTERNAL MEDICINE

## 2024-04-10 RX ORDER — MAGNESIUM SULFATE IN WATER 40 MG/ML
2000 INJECTION, SOLUTION INTRAVENOUS ONCE
Status: COMPLETED | OUTPATIENT
Start: 2024-04-10 | End: 2024-04-10

## 2024-04-10 RX ADMIN — POTASSIUM BICARBONATE 40 MEQ: 782 TABLET, EFFERVESCENT ORAL at 09:55

## 2024-04-10 RX ADMIN — DOCUSATE SODIUM 100 MG: 100 CAPSULE, LIQUID FILLED ORAL at 08:23

## 2024-04-10 RX ADMIN — CLOPIDOGREL BISULFATE 75 MG: 75 TABLET ORAL at 08:23

## 2024-04-10 RX ADMIN — ANASTROZOLE 1 MG: 1 TABLET ORAL at 08:23

## 2024-04-10 RX ADMIN — Medication 10 ML: at 08:23

## 2024-04-10 RX ADMIN — ASPIRIN 81 MG: 81 TABLET, COATED ORAL at 08:23

## 2024-04-10 RX ADMIN — MAGNESIUM SULFATE HEPTAHYDRATE 2000 MG: 40 INJECTION, SOLUTION INTRAVENOUS at 10:02

## 2024-04-10 ASSESSMENT — PAIN DESCRIPTION - ORIENTATION: ORIENTATION: RIGHT

## 2024-04-10 ASSESSMENT — PAIN SCALES - GENERAL
PAINLEVEL_OUTOF10: 3
PAINLEVEL_OUTOF10: 3

## 2024-04-10 ASSESSMENT — PAIN DESCRIPTION - LOCATION
LOCATION: CHEST
LOCATION: CHEST

## 2024-04-10 ASSESSMENT — PAIN DESCRIPTION - DESCRIPTORS
DESCRIPTORS: PRESSURE
DESCRIPTORS: PRESSURE

## 2024-04-10 NOTE — DISCHARGE SUMMARY
and stable prior to discharge.      Consult IP CONSULT TO CARDIAC REHAB   Subjective Patient seen and examined.  Notes, labs, tele and recent testing reviewed.  No acute issue overnight and patient without concern.     Exam Gen Alert, coop, no distress Heart  RRR, no MRG   Head NC, AT, no abnorm Abd  Soft, NT, +BS, no mass, no OM   Eyes PER, conj/corn clear Ext  No C/C, Grade 0 (0mm)   Nose Nares nl, no drain, NT Pulse 2+ and symmetric   Throat Lips, mucosa, tongue nl Skin Col/text/turg nl, no vis rash/les   Neck S/S, TM, NT, no JVD Psych Nl mood and affect   Lung CTA-B, unlab, no DTP Lymph   No cervical or axillary LA   Ch wall NT, no deform Neuro  Nl gross M/S exam      Studies/Labs Reviewed, please see Epic for specific details   Disposition home   Discharge Medications    Medication List        CONTINUE taking these medications      anastrozole 1 MG tablet  Commonly known as: ARIMIDEX     aspirin 81 MG EC tablet     Biotin 5000 MCG Tabs     CALCIUM/MAGNESIUM/ZINC FORMULA PO     CENTRUM SILVER PO     cetirizine 10 MG tablet  Commonly known as: ZYRTEC     Handicap Placard Misc  by Does not apply route Valid 9/20/2023 till 9/20/2024     hydroCHLOROthiazide 25 MG tablet  Commonly known as: HYDRODIURIL  TAKE 1 TABLET EVERY MORNING     pravastatin 20 MG tablet  Commonly known as: PRAVACHOL  Take 1 tablet by mouth daily     REFRESH OP     rivaroxaban 20 MG Tabs tablet  Commonly known as: XARELTO  Take 1 tablet by mouth Daily with supper     ZOLEDRONIC ACID IV            STOP taking these medications      lisinopril 20 MG tablet  Commonly known as: PRINIVIL;ZESTRIL             Summary Patients is stable from cardiac standpoint.  --Stop ACE-I due to cough. Will not replace at this time due to soft BP  Tobacco, diet, salt, activity restrictions/recommendation discussed in detail  HHC not indicated for AS or for TAVR procedure.  Resume only w/ other indications.   Followup I [facility] Clinic in 1 month with echo     D/C

## 2024-04-10 NOTE — PROGRESS NOTES
Discharge instructions reviewed with patient. Verbalized understanding. IV dc'd without complications. Tele box returned to nurse's station. All belongings packed. Patient awaiting daughter for ride home.

## 2024-04-10 NOTE — TELEPHONE ENCOUNTER
Moira, can you schedule pt for an echo and OV w Dr. Garner at [facility] in a TAVR spot in the next 3-6 weeks for 1 month post TAVR check? If echo can be done same day, that is ideal but if not, schedule it no sooner than 4/30/24 but prior to OV. No need to call pt. Thanks, Vanessa VENEGAS

## 2024-04-10 NOTE — PROGRESS NOTES
Daughter at bedside. Expressing concerns about no repeat PT/INR drawn post TAVR. Patient and daughter requesting PT/INR blood draw. THEO Malik notified. Orders received. Patient and daughter notified.

## 2024-04-10 NOTE — PROGRESS NOTES
Pt belongings gathered. Disposition is home (no HHC/DME needs), transported with daughter, taken to lobby via w/c w/ RN, no complications.

## 2024-04-10 NOTE — TELEPHONE ENCOUNTER
Called daughter mary jo Moser  asking to call back to schedule the 1 mo Echo and OV - pt requested to have these at [facility] - will put with DCE.     I have scheduled the echo for 5-2 and the ov for 5-9.  If these don't work for pt I will reschedule.

## 2024-04-10 NOTE — CARE COORDINATION
Discharge Planning Note:    Chart reviewed and it appears that patient has minimal needs for discharge at this time. Risk Score 8 %     Primary Care Physician is Dr. Reese  Primary insurance is [employer]    Please notify case management if any discharge needs are identified.      Case management will continue to follow progress and update discharge plan as needed.    Mckenzie Doll RN Case Manager  [phone]

## 2024-04-10 NOTE — PLAN OF CARE
Problem: Discharge Planning  Goal: Discharge to home or other facility with appropriate resources  Outcome: Progressing  Flowsheets (Taken 4/10/2024 1000)  Discharge to home or other facility with appropriate resources:   Identify barriers to discharge with patient and caregiver   Arrange for needed discharge resources and transportation as appropriate   Identify discharge learning needs (meds, wound care, etc)     Problem: Pain  Goal: Verbalizes/displays adequate comfort level or baseline comfort level  4/10/2024 1000 by Blayne Leong RN  Outcome: Progressing  Flowsheets (Taken 4/10/2024 1000)  Verbalizes/displays adequate comfort level or baseline comfort level:   Encourage patient to monitor pain and request assistance   Administer analgesics based on type and severity of pain and evaluate response   Assess pain using appropriate pain scale   Implement non-pharmacological measures as appropriate and evaluate response  4/10/2024 0159 by Crystal Story RN  Outcome: Progressing     Problem: ABCDS Injury Assessment  Goal: Absence of physical injury  4/10/2024 1000 by Blayne Leong RN  Outcome: Progressing  Flowsheets (Taken 4/10/2024 1000)  Absence of Physical Injury: Implement safety measures based on patient assessment  4/10/2024 0159 by Crystal Story RN  Outcome: Progressing     Problem: Safety - Adult  Goal: Free from fall injury  Outcome: Progressing  Flowsheets (Taken 4/10/2024 1000)  Free From Fall Injury:   Instruct family/caregiver on patient safety   Based on caregiver fall risk screen, instruct family/caregiver to ask for assistance with transferring infant if caregiver noted to have fall risk factors     Problem: Respiratory - Adult  Goal: Achieves optimal ventilation and oxygenation  Outcome: Progressing  Flowsheets (Taken 4/10/2024 1000)  Achieves optimal ventilation and oxygenation:   Assess for changes in respiratory status   Assess for changes in mentation and behavior   Oxygen

## 2024-04-11 ENCOUNTER — TELEPHONE (OUTPATIENT)
Dept: INTERNAL MEDICINE CLINIC | Age: 84
End: 2024-04-11

## 2024-04-11 NOTE — TELEPHONE ENCOUNTER
Care Transitions Initial Follow Up Call    Outreach made within 2 business days of discharge: Yes    Patient: Bisi Colindres Patient : 1940   MRN: [id number]  Reason for Admission: There are no discharge diagnoses documented for the most recent discharge.  Discharge Date: 4/10/24       Scheduled appointment with PCP within 7-14 days    LVM for pt to call the office to schedule HFU and complete TCM requirements.    Follow Up  Future Appointments   Date Time Provider Department Center   2024 10:20 AM Mo Reese MD [place] [facility] [place] - DYD   2024  9:00 AM ECHO ROOM 2 [facility]CHO [facility]   2024 10:45 AM Adams Plasencia MD FF Cardio [facility]   2024  9:45 AM [facility] MAMMO RM 1 [facility]Z WOMENS [facility]   2024 10:45 AM Jordyn Smith MD FF BRST SURG [facility]       Yahaira Bauman LPN

## 2024-04-12 ENCOUNTER — TELEPHONE (OUTPATIENT)
Dept: CARDIOLOGY CLINIC | Age: 84
End: 2024-04-12

## 2024-04-12 RX ORDER — LOSARTAN POTASSIUM 25 MG/1
25 TABLET ORAL DAILY
Qty: 30 TABLET | Refills: 3 | Status: SHIPPED | OUTPATIENT
Start: 2024-04-12

## 2024-04-12 NOTE — TELEPHONE ENCOUNTER
Patient hospital follow-up should be with cardiology since hospitalization was for heart valve replacement.  Patient already has an appointment April 24 for her annual wellness visit and we will see her then.  Can cancel the virtual visit

## 2024-04-12 NOTE — TELEPHONE ENCOUNTER
Pt sent [facility] message that her BP is running high at 150 systolic. Discussed with Dr. Plasencia. Will initiate losartan 25mg QD (sent to [facility] per request) and instructed her to call me next week for BP update. Pt states that overall, she is feeling \"great\" post TAVR. Vanessa VENEGAS

## 2024-04-13 SDOH — HEALTH STABILITY: PHYSICAL HEALTH: ON AVERAGE, HOW MANY MINUTES DO YOU ENGAGE IN EXERCISE AT THIS LEVEL?: 30 MIN

## 2024-04-13 SDOH — HEALTH STABILITY: PHYSICAL HEALTH: ON AVERAGE, HOW MANY DAYS PER WEEK DO YOU ENGAGE IN MODERATE TO STRENUOUS EXERCISE (LIKE A BRISK WALK)?: 3 DAYS

## 2024-04-13 ASSESSMENT — LIFESTYLE VARIABLES
HOW OFTEN DURING THE LAST YEAR HAVE YOU FOUND THAT YOU WERE NOT ABLE TO STOP DRINKING ONCE YOU HAD STARTED: NEVER
HOW OFTEN DO YOU HAVE SIX OR MORE DRINKS ON ONE OCCASION: 2
HAS A RELATIVE, FRIEND, DOCTOR, OR ANOTHER HEALTH PROFESSIONAL EXPRESSED CONCERN ABOUT YOUR DRINKING OR SUGGESTED YOU CUT DOWN: NO
HAS A RELATIVE, FRIEND, DOCTOR, OR ANOTHER HEALTH PROFESSIONAL EXPRESSED CONCERN ABOUT YOUR DRINKING OR SUGGESTED YOU CUT DOWN: NO
HAVE YOU OR SOMEONE ELSE BEEN INJURED AS A RESULT OF YOUR DRINKING: NO
HOW OFTEN DO YOU HAVE A DRINK CONTAINING ALCOHOL: MONTHLY OR LESS
HOW MANY STANDARD DRINKS CONTAINING ALCOHOL DO YOU HAVE ON A TYPICAL DAY: 1
HOW OFTEN DO YOU HAVE A DRINK CONTAINING ALCOHOL: 2
HOW OFTEN DURING THE LAST YEAR HAVE YOU FAILED TO DO WHAT WAS NORMALLY EXPECTED FROM YOU BECAUSE OF DRINKING: NEVER
HOW OFTEN DURING THE LAST YEAR HAVE YOU BEEN UNABLE TO REMEMBER WHAT HAPPENED THE NIGHT BEFORE BECAUSE YOU HAD BEEN DRINKING: NEVER
HOW OFTEN DURING THE LAST YEAR HAVE YOU BEEN UNABLE TO REMEMBER WHAT HAPPENED THE NIGHT BEFORE BECAUSE YOU HAD BEEN DRINKING: NEVER
HOW OFTEN DURING THE LAST YEAR HAVE YOU FAILED TO DO WHAT WAS NORMALLY EXPECTED FROM YOU BECAUSE OF DRINKING: NEVER
HOW OFTEN DURING THE LAST YEAR HAVE YOU NEEDED AN ALCOHOLIC DRINK FIRST THING IN THE MORNING TO GET YOURSELF GOING AFTER A NIGHT OF HEAVY DRINKING: NEVER
HOW OFTEN DURING THE LAST YEAR HAVE YOU NEEDED AN ALCOHOLIC DRINK FIRST THING IN THE MORNING TO GET YOURSELF GOING AFTER A NIGHT OF HEAVY DRINKING: NEVER
HOW OFTEN DURING THE LAST YEAR HAVE YOU FOUND THAT YOU WERE NOT ABLE TO STOP DRINKING ONCE YOU HAD STARTED: NEVER
HOW MANY STANDARD DRINKS CONTAINING ALCOHOL DO YOU HAVE ON A TYPICAL DAY: 1 OR 2
HOW OFTEN DURING THE LAST YEAR HAVE YOU HAD A FEELING OF GUILT OR REMORSE AFTER DRINKING: NEVER
HAVE YOU OR SOMEONE ELSE BEEN INJURED AS A RESULT OF YOUR DRINKING: NO
HOW OFTEN DURING THE LAST YEAR HAVE YOU HAD A FEELING OF GUILT OR REMORSE AFTER DRINKING: NEVER

## 2024-04-13 ASSESSMENT — PATIENT HEALTH QUESTIONNAIRE - PHQ9
SUM OF ALL RESPONSES TO PHQ QUESTIONS 1-9: 1
SUM OF ALL RESPONSES TO PHQ9 QUESTIONS 1 & 2: 1
SUM OF ALL RESPONSES TO PHQ QUESTIONS 1-9: 1
1. LITTLE INTEREST OR PLEASURE IN DOING THINGS: NOT AT ALL
SUM OF ALL RESPONSES TO PHQ QUESTIONS 1-9: 1
2. FEELING DOWN, DEPRESSED OR HOPELESS: SEVERAL DAYS
SUM OF ALL RESPONSES TO PHQ QUESTIONS 1-9: 1

## 2024-04-19 ENCOUNTER — TELEPHONE (OUTPATIENT)
Dept: CARDIOLOGY CLINIC | Age: 84
End: 2024-04-19

## 2024-04-19 NOTE — TELEPHONE ENCOUNTER
Pt states has not taken losartan for the past 3 days and her SBP has been running 130 or lower, occasionally as low as 100. Complaints of LH at times. Per Dr. Plasencia direction, instructed her to continue to hold losatan and to call if her SBP consistently runs over 150. She is seeing her PCP next week and will continue to monitor BP. VU of all. Vanessa VENEGAS

## 2024-04-21 SDOH — ECONOMIC STABILITY: TRANSPORTATION INSECURITY
IN THE PAST 12 MONTHS, HAS LACK OF TRANSPORTATION KEPT YOU FROM MEETINGS, WORK, OR FROM GETTING THINGS NEEDED FOR DAILY LIVING?: NO

## 2024-04-21 SDOH — ECONOMIC STABILITY: FOOD INSECURITY: WITHIN THE PAST 12 MONTHS, THE FOOD YOU BOUGHT JUST DIDN'T LAST AND YOU DIDN'T HAVE MONEY TO GET MORE.: NEVER TRUE

## 2024-04-21 SDOH — ECONOMIC STABILITY: INCOME INSECURITY: HOW HARD IS IT FOR YOU TO PAY FOR THE VERY BASICS LIKE FOOD, HOUSING, MEDICAL CARE, AND HEATING?: SOMEWHAT HARD

## 2024-04-21 SDOH — ECONOMIC STABILITY: FOOD INSECURITY: WITHIN THE PAST 12 MONTHS, YOU WORRIED THAT YOUR FOOD WOULD RUN OUT BEFORE YOU GOT MONEY TO BUY MORE.: NEVER TRUE

## 2024-04-24 ENCOUNTER — OFFICE VISIT (OUTPATIENT)
Dept: INTERNAL MEDICINE CLINIC | Age: 84
End: 2024-04-24
Payer: MEDICARE

## 2024-04-24 VITALS
SYSTOLIC BLOOD PRESSURE: 140 MMHG | HEART RATE: 94 BPM | OXYGEN SATURATION: 98 % | DIASTOLIC BLOOD PRESSURE: 78 MMHG | WEIGHT: 138.2 LBS | BODY MASS INDEX: 26.99 KG/M2

## 2024-04-24 DIAGNOSIS — E78.00 PURE HYPERCHOLESTEROLEMIA: ICD-10-CM

## 2024-04-24 DIAGNOSIS — Z00.00 MEDICARE ANNUAL WELLNESS VISIT, SUBSEQUENT: Primary | ICD-10-CM

## 2024-04-24 DIAGNOSIS — I10 ESSENTIAL HYPERTENSION: ICD-10-CM

## 2024-04-24 DIAGNOSIS — F41.1 GAD (GENERALIZED ANXIETY DISORDER): ICD-10-CM

## 2024-04-24 DIAGNOSIS — Z95.3 STATUS POST TRANSCATHETER AORTIC VALVE REPLACEMENT (TAVR) USING BIOPROSTHESIS: ICD-10-CM

## 2024-04-24 DIAGNOSIS — C50.911 INVASIVE DUCTAL CARCINOMA OF BREAST, STAGE 2, RIGHT (HCC): ICD-10-CM

## 2024-04-24 PROBLEM — G89.18 POST-OP PAIN: Status: RESOLVED | Noted: 2023-09-06 | Resolved: 2024-04-24

## 2024-04-24 PROBLEM — I35.0 SEVERE AORTIC STENOSIS: Status: RESOLVED | Noted: 2023-09-18 | Resolved: 2024-04-24

## 2024-04-24 PROBLEM — H61.21 IMPACTED CERUMEN OF RIGHT EAR: Status: RESOLVED | Noted: 2023-03-10 | Resolved: 2024-04-24

## 2024-04-24 PROCEDURE — 99213 OFFICE O/P EST LOW 20 MIN: CPT | Performed by: INTERNAL MEDICINE

## 2024-04-24 PROCEDURE — 3078F DIAST BP <80 MM HG: CPT | Performed by: INTERNAL MEDICINE

## 2024-04-24 PROCEDURE — G0439 PPPS, SUBSEQ VISIT: HCPCS | Performed by: INTERNAL MEDICINE

## 2024-04-24 PROCEDURE — 3077F SYST BP >= 140 MM HG: CPT | Performed by: INTERNAL MEDICINE

## 2024-04-24 PROCEDURE — 1123F ACP DISCUSS/DSCN MKR DOCD: CPT | Performed by: INTERNAL MEDICINE

## 2024-04-24 ASSESSMENT — ENCOUNTER SYMPTOMS
CHEST TIGHTNESS: 0
COLOR CHANGE: 0
BACK PAIN: 0
WHEEZING: 0
CONSTIPATION: 0
ABDOMINAL PAIN: 0
COUGH: 0
VOMITING: 0
SORE THROAT: 0
NAUSEA: 0
SHORTNESS OF BREATH: 0

## 2024-04-24 NOTE — ASSESSMENT & PLAN NOTE
remains on pravastatin, will update labs next visit, diet and exercise recommendations reinforced

## 2024-04-24 NOTE — ASSESSMENT & PLAN NOTE
2 weeks post TAVR, doing well and denies any shortness of breath or chest tightness, denies any dizzy spells.  Blood pressure has been fluctuating, was taken off medication then placed on losartan by cardiology, patient feels very nervous when blood pressure is higher than 150.  Encouraged to ensure adequate hydration and follow-up with cardiology as scheduled

## 2024-04-24 NOTE — PROGRESS NOTES
10/24/2024).    Recommended screening schedule for the next 5-10 years is provided to the patient in written form: see Patient Instructions/AVS.    Electronically signed by Mo Reese MD on 4/24/2024 at 10:54 AM.

## 2024-04-24 NOTE — ASSESSMENT & PLAN NOTE
remains on anastrozole with some side effects but generally well-tolerated, continue care and recommendation as per oncology

## 2024-04-24 NOTE — ASSESSMENT & PLAN NOTE
blood pressure checked twice remained borderline, advised patient to restart losartan 25 mg, recommended to hold only if her systolic readings are below 90 since reports has been mostly asymptomatic even when its lower than 90.  Suspect patient's anxiety and her checking blood pressure multiple times a day is contributing to the fluctuations in BP readings.  Encouraged to maintain healthy diet with active lifestyle, she will be following up with cardiology on the ninth.  Will update labs at her follow-up visit

## 2024-04-26 ENCOUNTER — TELEPHONE (OUTPATIENT)
Age: 84
End: 2024-04-26

## 2024-04-26 RX ORDER — LOSARTAN POTASSIUM 50 MG/1
50 TABLET ORAL DAILY
Qty: 30 TABLET | Refills: 3 | Status: SHIPPED | OUTPATIENT
Start: 2024-04-26

## 2024-04-26 NOTE — TELEPHONE ENCOUNTER
Pt states that her PB is consistently over 150 systolic taking losartan 25mg QD. Per Dr. Plasencia, instructed to increase losartan to 50mg QD, monitor her BP and call me Monday 4/29/24 with BP readings. PT VU of all. Vanessa VENEGAS

## 2024-04-30 ENCOUNTER — TELEPHONE (OUTPATIENT)
Dept: CARDIOLOGY CLINIC | Age: 84
End: 2024-04-30

## 2024-04-30 NOTE — TELEPHONE ENCOUNTER
Pt states that she has not taken her losartan x 3 days as her SBP is 110-126. States she is feeling fine. Discussed with Dr. Plasencia. Instructed her to only take losartan if her SBP is 150 or greater. Vanessa VENEGAS

## 2024-05-02 ENCOUNTER — HOSPITAL ENCOUNTER (OUTPATIENT)
Dept: NON INVASIVE DIAGNOSTICS | Age: 84
Discharge: HOME | End: 2024-05-02
Payer: MEDICARE

## 2024-05-02 DIAGNOSIS — Z95.2 S/P TAVR (TRANSCATHETER AORTIC VALVE REPLACEMENT): ICD-10-CM

## 2024-05-02 PROCEDURE — 93306 TTE W/DOPPLER COMPLETE: CPT

## 2024-05-08 NOTE — PROGRESS NOTES
[facility]  H+P  Consult  OP Visit  FU Visit   CC/HPI   CC Followup visit for cardiac conditions detailed in assessment and plan below.   Intervention TAVR   General Doing well.  No new concerns.     Cardiac Sx -CP, -SOB, -dizziness, -syncope, -edema, -orthopnea, -pnd, -fatigue   HISTORY/ALLERGY/ROS   M/S/S/F Hx Reviewed in Epic and updated as appropriate   ALLERG Hydrocortisone, Iodinated contrast media, Lisinopril, and Cortisone   ROS Full ROS obtained and negative except as mentioned in HPI   MEDICATIONS   Cardiac medications reviewed in Epic during visit with patient.   PHYSICAL EXAM   Vitals /80   Pulse 86   Wt 62 kg (136 lb 9.6 oz)   SpO2 93%   BMI 26.68 kg/m²    Gen Alert, coop, no distress Heart  RRR, no MRG   Lung CTA-B, unlabored, no DTP Extrem Edema -Grade 0 (0mm)      COMPLIANCE   Discussed and counseled on diet, exercise, weight loss, smoking, alcohol, drugs.  All questions answered.   CODING   SCI (43716) - 30-39 mins spent reviewing hx/tests/consults, performing exam, counseling/educating, ordering meds/tests/procedures, referring/communicating w/PCP/consultants, documenting in EMR, interpreting results, communicating medical information and plan with family.   SCRIBE ATTESTATION   Nurse I, Cheryl Hsieh, RN, am scribing for and in the presence of Adams Plasencia MD. 5/8/2024 3:03 PM EDT   Doctor Cheryl Hsieh is working as scribe for and in presence of me and may have prepopulated components of note with my historical intellectual property (IP) under my supervision.  Any additions to this IP performed in my presence and at my direction. Content and accuracy of this note reviewed by me (Adams Plasencia MD).   NEW MEDICATIONS   Pt verbalizes understanding of the need for treatment and education provided at today's visit.  Additional education provided in AVS.   ASSESSMENT AND PLAN   *AS    Date EF Detail   Sx     As above   Hx 4/24  ES3U +0cc   NYHA     II   CHF Meds     ACE/ARB/ARNI,

## 2024-05-08 NOTE — PATIENT INSTRUCTIONS
Take your blood pressure a few hours after you wake up and take it in the evening.     Take losartan 25mg at night    Dont see your dentist until October.

## 2024-05-09 ENCOUNTER — OFFICE VISIT (OUTPATIENT)
Dept: CARDIOLOGY CLINIC | Age: 84
End: 2024-05-09
Payer: MEDICARE

## 2024-05-09 VITALS
DIASTOLIC BLOOD PRESSURE: 80 MMHG | SYSTOLIC BLOOD PRESSURE: 138 MMHG | WEIGHT: 136.6 LBS | BODY MASS INDEX: 26.68 KG/M2 | HEART RATE: 86 BPM | OXYGEN SATURATION: 93 %

## 2024-05-09 DIAGNOSIS — Z95.2 HISTORY OF TRANSCATHETER AORTIC VALVE REPLACEMENT (TAVR): ICD-10-CM

## 2024-05-09 DIAGNOSIS — Z95.2 S/P TAVR (TRANSCATHETER AORTIC VALVE REPLACEMENT): ICD-10-CM

## 2024-05-09 DIAGNOSIS — I10 ESSENTIAL HYPERTENSION: ICD-10-CM

## 2024-05-09 DIAGNOSIS — E78.00 HYPERCHOLESTEROLEMIA: ICD-10-CM

## 2024-05-09 DIAGNOSIS — I35.0 AORTIC VALVE STENOSIS, NONRHEUMATIC: Primary | ICD-10-CM

## 2024-05-09 PROCEDURE — 3079F DIAST BP 80-89 MM HG: CPT | Performed by: INTERNAL MEDICINE

## 2024-05-09 PROCEDURE — 99214 OFFICE O/P EST MOD 30 MIN: CPT | Performed by: INTERNAL MEDICINE

## 2024-05-09 PROCEDURE — 3075F SYST BP GE 130 - 139MM HG: CPT | Performed by: INTERNAL MEDICINE

## 2024-05-09 PROCEDURE — 1123F ACP DISCUSS/DSCN MKR DOCD: CPT | Performed by: INTERNAL MEDICINE

## 2024-05-09 RX ORDER — LOSARTAN POTASSIUM 25 MG/1
25 TABLET ORAL DAILY
Qty: 90 TABLET | Refills: 3 | Status: SHIPPED | OUTPATIENT
Start: 2024-05-09

## 2024-05-13 DIAGNOSIS — I10 ESSENTIAL HYPERTENSION: ICD-10-CM

## 2024-05-13 RX ORDER — HYDROCHLOROTHIAZIDE 25 MG/1
25 TABLET ORAL EVERY MORNING
Qty: 90 TABLET | Refills: 1 | Status: SHIPPED | OUTPATIENT
Start: 2024-05-13

## 2024-07-15 NOTE — PROGRESS NOTES
Medical Oncology: Sarath  Radiation:  Other:        pT2N1a  STAGE:  IB right breast cancer      Ms. Colindres is a 83 y.o.-year-old woman who initially presented to me with  right breast cancer.  Since her last encounter Ms. Colindres has been doing quite well. Initially postop she was admitted to the hospital and identified to have extensive bilateral pulmonary emboli.  During her hospitalization she required CPR with rapid return of spontaneous circulation.  She underwent thrombectomies and is now on anticoagulation.      Her adjuvant treatment has included radiation and endocrine therapy.   She has no new breast related concerns today.        INTERVAL HISTORY:  On 9/6/2023 she underwent right mastectomy with sentinel lymph node biopsy.  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  ER positive UT positive HER2 negative.  Margins were negative.  Dermal involvement was noted without ulceration.  There is 1/4 lymph nodes involved with carcinoma. [id number]     Initiated on anastrozole    On 8/22/2023 she underwent genetic testing.  2 variants of uncertain significance were identified with no clinically actionable alterations.    On 7/18/2024 she underwent left screening mammography.  This was followed by diagnostic imaging for further evaluation of an asymmetry in the left breast.  The 1.2 cm asymmetry in the medial left breast disperses on spot compression.  No underlying suspicious mass is identified.  There are no new further concerning findings suspicious for malignancy.  BI-RADS 2.    Exam:  Physical exam has been reviewed and updated  General: no acute distress  Breast:  The patient was examined in the upright and supine position. There is a well healed scar on the right chest wall.  There are expected  post surgical and radiation related changes.  She has moderate range of motion with her arm.  Her contralateral breast shows no new masses or changes in breast contour.  There were no

## 2024-07-18 ENCOUNTER — OFFICE VISIT (OUTPATIENT)
Dept: SURGERY | Age: 84
End: 2024-07-18
Payer: MEDICARE

## 2024-07-18 ENCOUNTER — HOSPITAL ENCOUNTER (OUTPATIENT)
Dept: WOMENS IMAGING | Age: 84
Discharge: HOME | End: 2024-07-18
Payer: MEDICARE

## 2024-07-18 VITALS
WEIGHT: 138 LBS | DIASTOLIC BLOOD PRESSURE: 74 MMHG | HEIGHT: 61 IN | RESPIRATION RATE: 16 BRPM | SYSTOLIC BLOOD PRESSURE: 140 MMHG | BODY MASS INDEX: 26.06 KG/M2 | HEART RATE: 68 BPM

## 2024-07-18 VITALS — HEIGHT: 61 IN | WEIGHT: 136 LBS | BODY MASS INDEX: 25.68 KG/M2

## 2024-07-18 DIAGNOSIS — Z08 ENCOUNTER FOR FOLLOW-UP SURVEILLANCE OF BREAST CANCER: ICD-10-CM

## 2024-07-18 DIAGNOSIS — C50.911 PRIMARY BREAST MALIGNANCY, RIGHT (HCC): Primary | ICD-10-CM

## 2024-07-18 DIAGNOSIS — R92.8 ABNORMAL MAMMOGRAM: ICD-10-CM

## 2024-07-18 DIAGNOSIS — Z09 SURGICAL FOLLOW-UP CARE: ICD-10-CM

## 2024-07-18 DIAGNOSIS — Z85.3 PERSONAL HISTORY OF BREAST CANCER: ICD-10-CM

## 2024-07-18 DIAGNOSIS — Z12.39 SCREENING BREAST EXAMINATION: ICD-10-CM

## 2024-07-18 DIAGNOSIS — Z85.3 ENCOUNTER FOR FOLLOW-UP SURVEILLANCE OF BREAST CANCER: ICD-10-CM

## 2024-07-18 PROCEDURE — 77063 BREAST TOMOSYNTHESIS BI: CPT

## 2024-07-18 PROCEDURE — 99214 OFFICE O/P EST MOD 30 MIN: CPT | Performed by: SURGERY

## 2024-07-18 PROCEDURE — 3078F DIAST BP <80 MM HG: CPT | Performed by: SURGERY

## 2024-07-18 PROCEDURE — 1123F ACP DISCUSS/DSCN MKR DOCD: CPT | Performed by: SURGERY

## 2024-07-18 PROCEDURE — 3077F SYST BP >= 140 MM HG: CPT | Performed by: SURGERY

## 2024-07-18 PROCEDURE — G0279 TOMOSYNTHESIS, MAMMO: HCPCS

## 2024-07-21 DIAGNOSIS — E78.00 PURE HYPERCHOLESTEROLEMIA: ICD-10-CM

## 2024-07-22 RX ORDER — PRAVASTATIN SODIUM 20 MG
20 TABLET ORAL DAILY
Qty: 90 TABLET | Refills: 1 | Status: SHIPPED | OUTPATIENT
Start: 2024-07-22

## 2024-07-22 NOTE — TELEPHONE ENCOUNTER
Last OV: 4/24/2024  Next OV: 10/24/2024        Last fill: 2/19/2024  Refills: 1      OK TO FILL

## 2024-08-03 DIAGNOSIS — I10 ESSENTIAL HYPERTENSION: ICD-10-CM

## 2024-08-05 RX ORDER — HYDROCHLOROTHIAZIDE 25 MG/1
25 TABLET ORAL EVERY MORNING
Qty: 90 TABLET | Refills: 1 | Status: SHIPPED | OUTPATIENT
Start: 2024-08-05

## 2024-08-07 DIAGNOSIS — I10 ESSENTIAL HYPERTENSION: ICD-10-CM

## 2024-08-07 RX ORDER — HYDROCHLOROTHIAZIDE 25 MG/1
25 TABLET ORAL EVERY MORNING
Qty: 90 TABLET | Refills: 1 | OUTPATIENT
Start: 2024-08-07

## 2024-08-09 RX ORDER — LOSARTAN POTASSIUM 25 MG/1
25 TABLET ORAL DAILY
Qty: 90 TABLET | Refills: 3 | Status: SHIPPED | OUTPATIENT
Start: 2024-08-09

## 2024-08-12 ENCOUNTER — TELEPHONE (OUTPATIENT)
Dept: CARDIOLOGY CLINIC | Age: 84
End: 2024-08-12

## 2024-08-12 ENCOUNTER — PATIENT MESSAGE (OUTPATIENT)
Dept: INTERNAL MEDICINE CLINIC | Age: 84
End: 2024-08-12

## 2024-08-12 ENCOUNTER — PATIENT MESSAGE (OUTPATIENT)
Dept: CARDIOLOGY CLINIC | Age: 84
End: 2024-08-12

## 2024-08-12 ENCOUNTER — TELEPHONE (OUTPATIENT)
Dept: INTERNAL MEDICINE CLINIC | Age: 84
End: 2024-08-12

## 2024-08-12 DIAGNOSIS — E78.00 PURE HYPERCHOLESTEROLEMIA: ICD-10-CM

## 2024-08-12 DIAGNOSIS — I10 ESSENTIAL HYPERTENSION: ICD-10-CM

## 2024-08-12 DIAGNOSIS — I82.402 DEEP VEIN THROMBOSIS (DVT) OF LEFT LOWER EXTREMITY, UNSPECIFIED CHRONICITY, UNSPECIFIED VEIN (HCC): ICD-10-CM

## 2024-08-12 RX ORDER — HYDROCHLOROTHIAZIDE 25 MG/1
25 TABLET ORAL EVERY MORNING
Qty: 90 TABLET | Refills: 1 | Status: SHIPPED | OUTPATIENT
Start: 2024-08-12

## 2024-08-12 RX ORDER — PRAVASTATIN SODIUM 20 MG
20 TABLET ORAL DAILY
Qty: 90 TABLET | Refills: 1 | Status: SHIPPED | OUTPATIENT
Start: 2024-08-12

## 2024-08-12 NOTE — TELEPHONE ENCOUNTER
Patient called stating that she is having a hard time getting her Xarelto. She said that it was mailed but it was lost in the mail and she didn't receive it. She has tried again to get it and has been charged again by [facility].     Called and spoke to Marianela at [facility] and I was told that they are currently processing the prescription now. It will be sent next day once the order is processed. Marianela checked on the Rx and said that it in the label/ship and she will have the Xarelto before she runs out.     Called and let Bisi know that the of the above information. I asked that she call me back if she does not get the medication on Wednesday.

## 2024-08-12 NOTE — TELEPHONE ENCOUNTER
Pt calling, states pravastatin (PRAVACHOL) 20 MG tablet was lost in the mail and pharmacy is needing a new script- states she does not have any refills. Pt is also needing hydroCHLOROthiazide (HYDRODIURIL) 25 MG tablet, said the pharmacy never received script. Please advise and call pt.

## 2024-08-12 NOTE — TELEPHONE ENCOUNTER
Medication Refill    Medication needing refilled:  rivaroxaban (XARELTO) 20 MG TABS tablet     Dosage of the medication:  20 MG     How are you taking this medication (QD, BID, TID, QID, PRN):  Take 1 tablet by mouth Daily with supper     30 or 90 day supply called in:  90 tablet     When will you run out of your medication:  5 tablets left    Which Pharmacy are we sending the medication to?:  [facility] - [place] - [address] - P [phone] - F [phone]     Patient also asks if she is able to  sample in [place] if script cannot be refilled soon.    Please advise.      Medication Samples    Medication:rivaroxaban (XARELTO)     Dosage of the medication:20 mg     How are you taking this medication (QD, BID, TID, QID, PRN):    Take 1 tablet by mouth Daily with supper

## 2024-08-12 NOTE — TELEPHONE ENCOUNTER
Patient called in wanting to know if this medication was sent to [facility], she claims they told her she won't have any refills until October but she will be out of the medication by Friday. (It looks like it was refilled)     She also states she wants samples, but only wants to get them from [place] and was told they don't have any when she called there this morning.     She hung up during the call.     Please advise.     Patients callback: [phone]

## 2024-08-12 NOTE — TELEPHONE ENCOUNTER
Refill request for rivaroxaban (XARELTO) 20 MG was responded too by other means.      Disp Refills Start End    rivaroxaban (XARELTO) 20 MG TABS tablet 90 tablet 3 8/12/2024 --    Sig - Route: Take 1 tablet by mouth Daily with supper - Oral    Sent to pharmacy as: Rivaroxaban 20 MG Oral Tablet (XARELTO)    Cosign for Ordering: Required by Cody Garner MD    E-Prescribing Status: Receipt confirmed by pharmacy (8/12/2024 11:24 AM EDT)

## 2024-08-13 ENCOUNTER — PATIENT MESSAGE (OUTPATIENT)
Dept: INTERNAL MEDICINE CLINIC | Age: 84
End: 2024-08-13

## 2024-10-24 ENCOUNTER — OFFICE VISIT (OUTPATIENT)
Dept: INTERNAL MEDICINE CLINIC | Age: 84
End: 2024-10-24

## 2024-10-24 VITALS
OXYGEN SATURATION: 97 % | BODY MASS INDEX: 26.23 KG/M2 | HEART RATE: 95 BPM | SYSTOLIC BLOOD PRESSURE: 160 MMHG | WEIGHT: 138.8 LBS | DIASTOLIC BLOOD PRESSURE: 78 MMHG

## 2024-10-24 DIAGNOSIS — C50.911 INVASIVE DUCTAL CARCINOMA OF BREAST, STAGE 2, RIGHT (HCC): ICD-10-CM

## 2024-10-24 DIAGNOSIS — R73.01 IMPAIRED FASTING GLUCOSE: ICD-10-CM

## 2024-10-24 DIAGNOSIS — Z95.3 STATUS POST TRANSCATHETER AORTIC VALVE REPLACEMENT (TAVR) USING BIOPROSTHESIS: ICD-10-CM

## 2024-10-24 DIAGNOSIS — I10 ESSENTIAL HYPERTENSION: ICD-10-CM

## 2024-10-24 DIAGNOSIS — Z86.718 HISTORY OF DVT (DEEP VEIN THROMBOSIS): ICD-10-CM

## 2024-10-24 DIAGNOSIS — G57.93 NEUROPATHY OF BOTH FEET: ICD-10-CM

## 2024-10-24 DIAGNOSIS — E78.00 PURE HYPERCHOLESTEROLEMIA: ICD-10-CM

## 2024-10-24 DIAGNOSIS — Z23 NEEDS FLU SHOT: ICD-10-CM

## 2024-10-24 DIAGNOSIS — I10 ESSENTIAL HYPERTENSION: Primary | ICD-10-CM

## 2024-10-24 PROBLEM — I82.452 ACUTE DEEP VEIN THROMBOSIS (DVT) OF LEFT PERONEAL VEIN (HCC): Status: RESOLVED | Noted: 2023-09-15 | Resolved: 2024-10-24

## 2024-10-24 PROBLEM — Z86.711 HISTORY OF PULMONARY EMBOLUS (PE): Status: ACTIVE | Noted: 2024-10-24

## 2024-10-24 PROBLEM — R01.1 HEART MURMUR: Status: RESOLVED | Noted: 2023-03-10 | Resolved: 2024-10-24

## 2024-10-24 PROBLEM — I26.94 MULTIPLE SUBSEGMENTAL PULMONARY EMBOLI WITHOUT ACUTE COR PULMONALE (HCC): Status: RESOLVED | Noted: 2023-09-14 | Resolved: 2024-10-24

## 2024-10-24 LAB
ALBUMIN SERPL-MCNC: 4.6 G/DL (ref 3.4–5)
ALBUMIN/GLOB SERPL: 2 {RATIO} (ref 1.1–2.2)
ALP SERPL-CCNC: 72 U/L (ref 40–129)
ALT SERPL-CCNC: 25 U/L (ref 10–40)
ANION GAP SERPL CALCULATED.3IONS-SCNC: 14 MMOL/L (ref 3–16)
AST SERPL-CCNC: 31 U/L (ref 15–37)
BILIRUB SERPL-MCNC: 0.7 MG/DL (ref 0–1)
BUN SERPL-MCNC: 19 MG/DL (ref 7–20)
CALCIUM SERPL-MCNC: 10.1 MG/DL (ref 8.3–10.6)
CHLORIDE SERPL-SCNC: 97 MMOL/L (ref 99–110)
CHOLEST SERPL-MCNC: 193 MG/DL (ref 0–199)
CO2 SERPL-SCNC: 28 MMOL/L (ref 21–32)
CREAT SERPL-MCNC: 0.9 MG/DL (ref 0.6–1.2)
DEPRECATED RDW RBC AUTO: 13.8 % (ref 12.4–15.4)
FOLATE SERPL-MCNC: 16.7 NG/ML (ref 4.78–24.2)
GFR SERPLBLD CREATININE-BSD FMLA CKD-EPI: 63 ML/MIN/{1.73_M2}
GLUCOSE SERPL-MCNC: 105 MG/DL (ref 70–99)
HCT VFR BLD AUTO: 42.4 % (ref 36–48)
HDLC SERPL-MCNC: 88 MG/DL (ref 40–60)
HGB BLD-MCNC: 14.7 G/DL (ref 12–16)
LDLC SERPL CALC-MCNC: 88 MG/DL
MCH RBC QN AUTO: 33.1 PG (ref 26–34)
MCHC RBC AUTO-ENTMCNC: 34.7 G/DL (ref 31–36)
MCV RBC AUTO: 95.5 FL (ref 80–100)
PLATELET # BLD AUTO: 198 K/UL (ref 135–450)
PMV BLD AUTO: 8.9 FL (ref 5–10.5)
POTASSIUM SERPL-SCNC: 3.5 MMOL/L (ref 3.5–5.1)
PROT SERPL-MCNC: 6.9 G/DL (ref 6.4–8.2)
RBC # BLD AUTO: 4.44 M/UL (ref 4–5.2)
SODIUM SERPL-SCNC: 139 MMOL/L (ref 136–145)
TRIGL SERPL-MCNC: 86 MG/DL (ref 0–150)
TSH SERPL DL<=0.005 MIU/L-ACNC: 2.24 UIU/ML (ref 0.27–4.2)
VIT B12 SERPL-MCNC: 611 PG/ML (ref 211–911)
VLDLC SERPL CALC-MCNC: 17 MG/DL
WBC # BLD AUTO: 6.1 K/UL (ref 4–11)

## 2024-10-24 ASSESSMENT — ENCOUNTER SYMPTOMS
ABDOMINAL PAIN: 0
NAUSEA: 0
CHEST TIGHTNESS: 0
CONSTIPATION: 0
SORE THROAT: 0
VOMITING: 0
COLOR CHANGE: 0
BACK PAIN: 0
COUGH: 0
WHEEZING: 0
SHORTNESS OF BREATH: 0

## 2024-10-24 NOTE — PROGRESS NOTES
ASSESSMENT/PLAN:  1. Essential hypertension  Assessment & Plan:  Blood pressure borderline elevated however patient undergoing lots of stress caring for her sister and her sister's elderly  who was recently diagnosed with dementia.  Readings did come down towards the end of the visit.  Advised to continue current medication regimen, continue adhering to low-salt diet with active lifestyle and follow-up with cardiology as scheduled.  Will update labs this visit   Orders:  -     CBC; Future  -     Comprehensive Metabolic Panel; Future  -     TSH with Reflex to FT4; Future  2. History of DVT (deep vein thrombosis)  Assessment & Plan:  Remains on Eliquis for history of DVT and PE postoperatively, denies any bleeding side effects   3. Neuropathy of both feet  Assessment & Plan:  Explained to patient this can be multifactorial including medication side effects, she has good peripheral pulses and reassured no concerns for poor circulation, she does have superficial varicosities and again reassured these are not secondary to poor circulation or arterial insufficiency.  Will check labs to complete workup   Orders:  -     TSH with Reflex to FT4; Future  -     Vitamin B12 & Folate; Future  4. Status post transcatheter aortic valve replacement (TAVR) using bioprosthesis  Assessment & Plan:  Stable, continue care and recommendation as per cardiology   5. Invasive ductal carcinoma of breast, stage 2, right (HCC)  Assessment & Plan:    doing well on anastrozole, has some neuropathy of the feet otherwise no concerns, follow-up with breast surgery and oncology as scheduled  6. Pure hypercholesterolemia  -     Comprehensive Metabolic Panel; Future  -     Hemoglobin A1C; Future  -     Lipid Panel; Future  -     TSH with Reflex to FT4; Future  7. Impaired fasting glucose  -     Comprehensive Metabolic Panel; Future  -     Hemoglobin A1C; Future  8. Needs flu shot  -     Influenza, FLUAD Trivalent, (age 65 y+), IM, Preservative

## 2024-10-25 LAB
EST. AVERAGE GLUCOSE BLD GHB EST-MCNC: 99.7 MG/DL
HBA1C MFR BLD: 5.1 %

## 2024-11-19 ENCOUNTER — PATIENT MESSAGE (OUTPATIENT)
Dept: INTERNAL MEDICINE CLINIC | Age: 84
End: 2024-11-19

## 2024-12-27 RX ORDER — LOSARTAN POTASSIUM 25 MG/1
25 TABLET ORAL DAILY
Qty: 90 TABLET | Refills: 3 | Status: SHIPPED | OUTPATIENT
Start: 2024-12-27

## 2024-12-27 NOTE — TELEPHONE ENCOUNTER
Requested Prescriptions     Pending Prescriptions Disp Refills    losartan (COZAAR) 25 MG tablet 90 tablet 3     Sig: Take 1 tablet by mouth daily      Last OV:  5/9/2024 DCE    Next OV: Visit/ recall date not found     Last Labs: 10/24/2024 CMP    Last Filled: 08/09/2024 DCE

## 2025-01-13 NOTE — PROGRESS NOTES
Medical Oncology: Sarath  Radiation:  Other:        pT2N1a  STAGE:  IB right breast cancer      Ms. Colindres is a 84 y.o.-year-old woman who initially presented to me with  right breast cancer.  Since her last encounter Ms. Colindres has been doing quite well. Initially postop she was admitted to the hospital and identified to have extensive bilateral pulmonary emboli.  During her hospitalization she required CPR with rapid return of spontaneous circulation.  She underwent thrombectomies and is now on anticoagulation.      Her adjuvant treatment has included radiation and endocrine therapy.   She has no new breast related concerns today.        INTERVAL HISTORY:  On 9/6/2023 she underwent right mastectomy with sentinel lymph node biopsy.  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  ER positive OH positive HER2 negative.  Margins were negative.  Dermal involvement was noted without ulceration.  There is 1/4 lymph nodes involved with carcinoma. [id number]     Initiated on anastrozole    On 8/22/2023 she underwent genetic testing.  2 variants of uncertain significance were identified with no clinically actionable alterations.    On 7/18/2024 she underwent left screening mammography.  This was followed by diagnostic imaging for further evaluation of an asymmetry in the left breast.  The 1.2 cm asymmetry in the medial left breast disperses on spot compression.  No underlying suspicious mass is identified.  There are no new further concerning findings suspicious for malignancy.  BI-RADS 2.    Exam:  Physical exam has been reviewed and updated  General: no acute distress  Breast:  The patient was examined in the upright and supine position. There is a well healed scar on the right chest wall.  There are expected  post surgical and radiation related changes.  She has moderate range of motion with her arm.  Her contralateral breast shows no new masses or changes in breast contour.  There were no

## 2025-01-20 ENCOUNTER — OFFICE VISIT (OUTPATIENT)
Dept: SURGERY | Age: 85
End: 2025-01-20
Payer: MEDICARE

## 2025-01-20 VITALS
HEIGHT: 61 IN | RESPIRATION RATE: 16 BRPM | WEIGHT: 138.8 LBS | OXYGEN SATURATION: 94 % | HEART RATE: 85 BPM | BODY MASS INDEX: 26.21 KG/M2 | DIASTOLIC BLOOD PRESSURE: 90 MMHG | SYSTOLIC BLOOD PRESSURE: 165 MMHG

## 2025-01-20 DIAGNOSIS — Z12.31 SCREENING MAMMOGRAM, ENCOUNTER FOR: ICD-10-CM

## 2025-01-20 DIAGNOSIS — Z12.39 SCREENING BREAST EXAMINATION: ICD-10-CM

## 2025-01-20 DIAGNOSIS — C50.911 PRIMARY BREAST MALIGNANCY, RIGHT (HCC): Primary | ICD-10-CM

## 2025-01-20 DIAGNOSIS — Z09 SURGICAL FOLLOW-UP CARE: ICD-10-CM

## 2025-01-20 DIAGNOSIS — Z08 ENCOUNTER FOR FOLLOW-UP SURVEILLANCE OF BREAST CANCER: ICD-10-CM

## 2025-01-20 DIAGNOSIS — Z85.3 ENCOUNTER FOR FOLLOW-UP SURVEILLANCE OF BREAST CANCER: ICD-10-CM

## 2025-01-20 PROCEDURE — 1159F MED LIST DOCD IN RCRD: CPT | Performed by: SURGERY

## 2025-01-20 PROCEDURE — 3077F SYST BP >= 140 MM HG: CPT | Performed by: SURGERY

## 2025-01-20 PROCEDURE — 1126F AMNT PAIN NOTED NONE PRSNT: CPT | Performed by: SURGERY

## 2025-01-20 PROCEDURE — 3080F DIAST BP >= 90 MM HG: CPT | Performed by: SURGERY

## 2025-01-20 PROCEDURE — 1123F ACP DISCUSS/DSCN MKR DOCD: CPT | Performed by: SURGERY

## 2025-01-20 PROCEDURE — 99214 OFFICE O/P EST MOD 30 MIN: CPT | Performed by: SURGERY

## 2025-01-21 DIAGNOSIS — Z12.31 SCREENING MAMMOGRAM, ENCOUNTER FOR: Primary | ICD-10-CM

## 2025-01-21 DIAGNOSIS — Z85.3 PERSONAL HISTORY OF BREAST CANCER: ICD-10-CM

## 2025-01-29 NOTE — ASSESSMENT & PLAN NOTE
Ear lavage done and big chunk of wax removed with aid of lighted curette.   Advised patient can use over-the-counter Debrox to prevent further buildup and future recurrence, advised if concerned there is deeper wax then needs to see ENT Strep is negative at this time. Strong indication of sinusitis and recommend due to symptoms x 2 + weeks treatment. If not improving, worsening of symptoms to follow up with PCP

## 2025-01-30 ENCOUNTER — OFFICE VISIT (OUTPATIENT)
Dept: INTERNAL MEDICINE CLINIC | Age: 85
End: 2025-01-30

## 2025-01-30 VITALS
HEART RATE: 98 BPM | WEIGHT: 139 LBS | BODY MASS INDEX: 26.26 KG/M2 | DIASTOLIC BLOOD PRESSURE: 70 MMHG | SYSTOLIC BLOOD PRESSURE: 145 MMHG | OXYGEN SATURATION: 99 %

## 2025-01-30 DIAGNOSIS — Z01.818 PREOP EXAM FOR INTERNAL MEDICINE: Primary | ICD-10-CM

## 2025-01-30 DIAGNOSIS — Z86.711 HISTORY OF PULMONARY EMBOLUS (PE): ICD-10-CM

## 2025-01-30 DIAGNOSIS — I10 ESSENTIAL HYPERTENSION: ICD-10-CM

## 2025-01-30 DIAGNOSIS — F41.1 GAD (GENERALIZED ANXIETY DISORDER): ICD-10-CM

## 2025-01-30 SDOH — ECONOMIC STABILITY: FOOD INSECURITY: WITHIN THE PAST 12 MONTHS, THE FOOD YOU BOUGHT JUST DIDN'T LAST AND YOU DIDN'T HAVE MONEY TO GET MORE.: NEVER TRUE

## 2025-01-30 SDOH — ECONOMIC STABILITY: FOOD INSECURITY: WITHIN THE PAST 12 MONTHS, YOU WORRIED THAT YOUR FOOD WOULD RUN OUT BEFORE YOU GOT MONEY TO BUY MORE.: NEVER TRUE

## 2025-01-30 ASSESSMENT — PATIENT HEALTH QUESTIONNAIRE - PHQ9
2. FEELING DOWN, DEPRESSED OR HOPELESS: NOT AT ALL
1. LITTLE INTEREST OR PLEASURE IN DOING THINGS: NOT AT ALL
SUM OF ALL RESPONSES TO PHQ9 QUESTIONS 1 & 2: 0
SUM OF ALL RESPONSES TO PHQ QUESTIONS 1-9: 0

## 2025-01-30 ASSESSMENT — ENCOUNTER SYMPTOMS
SHORTNESS OF BREATH: 0
COUGH: 0
VOMITING: 0
ABDOMINAL PAIN: 0
WHEEZING: 0
CHEST TIGHTNESS: 0
NAUSEA: 0
SORE THROAT: 0
CONSTIPATION: 0
BACK PAIN: 0
COLOR CHANGE: 0

## 2025-01-30 NOTE — PROGRESS NOTES
ASSESSMENT/PLAN:  1. Preop exam for internal medicine  Assessment & Plan:  History and physical form completed, will fax to Dr. Peter Quinonez at [facility], patient is medically cleared to proceed with superficial keratectomy of the right eye as scheduled.  She will continue with Xarelto since procedure is superficial and under topical anesthesia.  Patient will follow-up as scheduled in April 2. Essential hypertension  Assessment & Plan:  Blood pressure borderline elevated however patient known to have an anxiety induced hypertension, recheck did come down to acceptable range, will continue current medication regimen, continue follow-up with cardiology as scheduled.  Labs checked within the past 3 months   3. TIFFANIE (generalized anxiety disorder)  Assessment & Plan:  Counseling done again, remains not on any medications and symptoms mostly manageable, will continue to monitor and reassess at her upcoming appointment in 3 months for her wellness   4. History of pulmonary embolus (PE)  Assessment & Plan:    remains on long-term anticoagulation with Xarelto, tolerating well without any side effects, continue same      Return for as scheduled, AWV.     SUBJECTIVE  HPI:   Here for preop exam, scheduled to have eye surgery under topical retrobulbar block/MAC anesthesia by Dr. Rm at [facility], will be having superficial keratectomy of the right eye  Has been very anxious and stressed out which is causing her home blood pressure readings to go up, reports she did see a new oncologist yesterday which was probably increasing her anxiety.  Reports all follow-up and testing continues to show she is in remission, she remains on anastrozole with good tolerance, remains on anticoagulation with Xarelto due to history of PE with good tolerance and no side effects        Review of Systems   Constitutional:  Negative for activity change, appetite change and fatigue.   HENT:  Negative for congestion,

## 2025-01-30 NOTE — ASSESSMENT & PLAN NOTE
Blood pressure borderline elevated however patient known to have an anxiety induced hypertension, recheck did come down to acceptable range, will continue current medication regimen, continue follow-up with cardiology as scheduled.  Labs checked within the past 3 months

## 2025-01-30 NOTE — ASSESSMENT & PLAN NOTE
Counseling done again, remains not on any medications and symptoms mostly manageable, will continue to monitor and reassess at her upcoming appointment in 3 months for her wellness

## 2025-01-30 NOTE — ASSESSMENT & PLAN NOTE
History and physical form completed, will fax to Dr. Peter Quinonez at [facility], patient is medically cleared to proceed with superficial keratectomy of the right eye as scheduled.  She will continue with Xarelto since procedure is superficial and under topical anesthesia.  Patient will follow-up as scheduled in April

## 2025-01-30 NOTE — ASSESSMENT & PLAN NOTE
remains on long-term anticoagulation with Xarelto, tolerating well without any side effects, continue same

## 2025-02-14 ENCOUNTER — TELEPHONE (OUTPATIENT)
Dept: CARDIOLOGY CLINIC | Age: 85
End: 2025-02-14

## 2025-02-14 NOTE — TELEPHONE ENCOUNTER
Moira, can you schedule pt for an echo and OV with Dr. Garner in April or May at [place] for her 1 year post TAVR FU? Vanessa VENEGAS

## 2025-02-14 NOTE — TELEPHONE ENCOUNTER
Spoke with Bisi, she can't go to [facility] too far of a drive.  She wants to see Patsy and thought he had clinic on Mondays at  - I advised that I searched his schedule and he doesn't have clinic at  in April, May or June. She doesn't think her daughter can take her to [place] due to possible hernia surgery in April and not sure how long she will be down.  Can she see Dr. Plasencia instead?     Essential hypertension

## 2025-02-17 NOTE — TELEPHONE ENCOUNTER
Spoke with Bisi, she is scheduled for Echo & OV with DCE (15 min) on 5-1-25 at 8 & 9:15 at [facility]

## 2025-03-01 PROBLEM — Z01.818 PREOP EXAM FOR INTERNAL MEDICINE: Status: RESOLVED | Noted: 2023-08-16 | Resolved: 2025-03-01

## 2025-03-10 ENCOUNTER — TELEPHONE (OUTPATIENT)
Dept: CARDIOLOGY CLINIC | Age: 85
End: 2025-03-10

## 2025-03-10 NOTE — TELEPHONE ENCOUNTER
I have patient scheduled to see Dr Garner on 4/14. Could we please see if she can get her echo a few days prior to this. She would like it to be around 10. Thank you.

## 2025-03-21 DIAGNOSIS — E78.00 PURE HYPERCHOLESTEROLEMIA: ICD-10-CM

## 2025-03-21 DIAGNOSIS — I10 ESSENTIAL HYPERTENSION: ICD-10-CM

## 2025-03-24 RX ORDER — PRAVASTATIN SODIUM 20 MG
20 TABLET ORAL DAILY
Qty: 90 TABLET | Refills: 1 | Status: SHIPPED | OUTPATIENT
Start: 2025-03-24

## 2025-03-24 RX ORDER — HYDROCHLOROTHIAZIDE 25 MG/1
25 TABLET ORAL EVERY MORNING
Qty: 90 TABLET | Refills: 1 | Status: SHIPPED | OUTPATIENT
Start: 2025-03-24

## 2025-03-24 NOTE — TELEPHONE ENCOUNTER
Last OV: 1/30/2025  Next OV: 4/23/2025      Last fill: 8/12/2024, 8/12/2024  Refills: #90/1, #90/1

## 2025-04-04 RX ORDER — AMOXICILLIN 500 MG/1
CAPSULE ORAL
Qty: 4 CAPSULE | Refills: 1 | Status: SHIPPED | OUTPATIENT
Start: 2025-04-04

## 2025-04-04 NOTE — PROGRESS NOTES
Attempted to call pt again.  No answer.  No VM   S/p TAVR   No PCN allergy   Amoxicillin 2G 30-60 minutes prior to dental   Script sent   Communication routed back to patient via [employer].

## 2025-04-08 ENCOUNTER — OFFICE VISIT (OUTPATIENT)
Dept: INTERNAL MEDICINE CLINIC | Age: 85
End: 2025-04-08

## 2025-04-08 ENCOUNTER — PATIENT MESSAGE (OUTPATIENT)
Dept: INTERNAL MEDICINE CLINIC | Age: 85
End: 2025-04-08

## 2025-04-08 VITALS
SYSTOLIC BLOOD PRESSURE: 150 MMHG | WEIGHT: 135.8 LBS | OXYGEN SATURATION: 96 % | DIASTOLIC BLOOD PRESSURE: 90 MMHG | BODY MASS INDEX: 25.64 KG/M2 | HEIGHT: 61 IN | TEMPERATURE: 98.3 F | HEART RATE: 80 BPM

## 2025-04-08 DIAGNOSIS — I10 ESSENTIAL HYPERTENSION: ICD-10-CM

## 2025-04-08 DIAGNOSIS — J01.00 ACUTE NON-RECURRENT MAXILLARY SINUSITIS: Primary | ICD-10-CM

## 2025-04-08 DIAGNOSIS — R51.9 RIGHT FACIAL PAIN: ICD-10-CM

## 2025-04-08 RX ORDER — AMOXICILLIN 500 MG/1
500 CAPSULE ORAL 3 TIMES DAILY
Qty: 30 CAPSULE | Refills: 0 | Status: SHIPPED | OUTPATIENT
Start: 2025-04-08 | End: 2025-04-18

## 2025-04-08 NOTE — ASSESSMENT & PLAN NOTE
Acute, new problem.  Patient has severe right maxillary tenderness with no other symptoms but postnasal drainage.  She has no right sided swelling or dental abscess.  She is to continue Zyrtec 10 mg daily.  Will treat with amoxicillin 500 mg 3 times a day for 10 days.  Keep scheduled appointment with dentist .

## 2025-04-08 NOTE — PROGRESS NOTES
person, place, and time.   Psychiatric:         Mood and Affect: Mood normal.         Behavior: Behavior normal.     All questions answered. Patient stated no further questions or concerns at this time.     Electronically signed by JAYLEN Ruiz NP on 4/8/2025 at 11:33 AM.

## 2025-04-10 ENCOUNTER — HOSPITAL ENCOUNTER (OUTPATIENT)
Age: 85
Discharge: HOME | End: 2025-04-12
Attending: INTERNAL MEDICINE
Payer: MEDICARE

## 2025-04-10 VITALS
SYSTOLIC BLOOD PRESSURE: 151 MMHG | WEIGHT: 135 LBS | HEIGHT: 61 IN | DIASTOLIC BLOOD PRESSURE: 74 MMHG | BODY MASS INDEX: 25.49 KG/M2

## 2025-04-10 DIAGNOSIS — Z95.2 HISTORY OF TRANSCATHETER AORTIC VALVE REPLACEMENT (TAVR): ICD-10-CM

## 2025-04-10 LAB
ECHO AO ASC DIAM: 2.7 CM
ECHO AO ASCENDING AORTA INDEX: 1.69 CM/M2
ECHO AO ROOT DIAM: 2 CM
ECHO AO ROOT INDEX: 1.25 CM/M2
ECHO AV AREA PEAK VELOCITY: 1.1 CM2
ECHO AV AREA VTI: 1 CM2
ECHO AV AREA/BSA PEAK VELOCITY: 0.7 CM2/M2
ECHO AV AREA/BSA VTI: 0.6 CM2/M2
ECHO AV MEAN GRADIENT: 8 MMHG
ECHO AV MEAN GRADIENT: 8 MMHG
ECHO AV MEAN VELOCITY: 1.4 M/S
ECHO AV PEAK GRADIENT: 15 MMHG
ECHO AV PEAK VELOCITY: 1.9 M/S
ECHO AV VELOCITY RATIO: 0.68
ECHO AV VTI: 38.5 CM
ECHO BSA: 1.62 M2
ECHO IVC PROX: 1.5 CM
ECHO LA AREA 2C: 12.4 CM2
ECHO LA AREA 4C: 11 CM2
ECHO LA DIAMETER INDEX: 1.44 CM/M2
ECHO LA DIAMETER: 2.3 CM
ECHO LA MAJOR AXIS: 3 CM
ECHO LA MINOR AXIS: 4.2 CM
ECHO LA TO AORTIC ROOT RATIO: 1.15
ECHO LA VOL MOD A2C: 27 ML (ref 22–52)
ECHO LA VOL MOD A4C: 29 ML (ref 22–52)
ECHO LA VOLUME INDEX MOD A2C: 17 ML/M2 (ref 16–34)
ECHO LA VOLUME INDEX MOD A4C: 18 ML/M2 (ref 16–34)
ECHO LV E' LATERAL VELOCITY: 4.68 CM/S
ECHO LV E' SEPTAL VELOCITY: 4.46 CM/S
ECHO LV EDV A2C: 38 ML
ECHO LV EDV A4C: 45 ML
ECHO LV EDV INDEX A4C: 28 ML/M2
ECHO LV EDV NDEX A2C: 24 ML/M2
ECHO LV EF PHYSICIAN: 65 %
ECHO LV EJECTION FRACTION A2C: 66 %
ECHO LV EJECTION FRACTION A4C: 72 %
ECHO LV EJECTION FRACTION BIPLANE: 69 % (ref 55–100)
ECHO LV ESV A2C: 13 ML
ECHO LV ESV A4C: 13 ML
ECHO LV ESV INDEX A2C: 8 ML/M2
ECHO LV ESV INDEX A4C: 8 ML/M2
ECHO LV FRACTIONAL SHORTENING: 32 % (ref 28–44)
ECHO LV INTERNAL DIMENSION DIASTOLE INDEX: 2.31 CM/M2
ECHO LV INTERNAL DIMENSION DIASTOLIC: 3.7 CM (ref 3.9–5.3)
ECHO LV INTERNAL DIMENSION SYSTOLIC INDEX: 1.56 CM/M2
ECHO LV INTERNAL DIMENSION SYSTOLIC: 2.5 CM
ECHO LV IVSD: 0.8 CM (ref 0.6–0.9)
ECHO LV MASS 2D: 89.5 G (ref 67–162)
ECHO LV MASS INDEX 2D: 55.9 G/M2 (ref 43–95)
ECHO LV POSTERIOR WALL DIASTOLIC: 0.9 CM (ref 0.6–0.9)
ECHO LV RELATIVE WALL THICKNESS RATIO: 0.49
ECHO LVOT AREA: 1.5 CM2
ECHO LVOT AV VTI INDEX: 0.64
ECHO LVOT DIAM: 1.4 CM
ECHO LVOT MEAN GRADIENT: 3 MMHG
ECHO LVOT PEAK GRADIENT: 7 MMHG
ECHO LVOT PEAK VELOCITY: 1.3 M/S
ECHO LVOT STROKE VOLUME INDEX: 23.7 ML/M2
ECHO LVOT SV: 37.8 ML
ECHO LVOT VTI: 24.6 CM
ECHO MV A VELOCITY: 1.72 M/S
ECHO MV AREA VTI: 1.1 CM2
ECHO MV E DECELERATION TIME (DT): 261 MS
ECHO MV E VELOCITY: 0.95 M/S
ECHO MV E/A RATIO: 0.55
ECHO MV E/E' LATERAL: 20.3
ECHO MV E/E' RATIO (AVERAGED): 20.8
ECHO MV E/E' SEPTAL: 21.3
ECHO MV LVOT VTI INDEX: 1.39
ECHO MV MAX VELOCITY: 1.7 M/S
ECHO MV MEAN GRADIENT: 5 MMHG
ECHO MV MEAN VELOCITY: 1.1 M/S
ECHO MV PEAK GRADIENT: 12 MMHG
ECHO MV VTI: 34.3 CM
ECHO PV MAX VELOCITY: 1.1 M/S
ECHO PV MEAN GRADIENT: 3 MMHG
ECHO PV MEAN VELOCITY: 0.8 M/S
ECHO PV PEAK GRADIENT: 5 MMHG
ECHO PV VTI: 20.9 CM
ECHO RA AREA 4C: 8 CM2
ECHO RA END SYSTOLIC VOLUME APICAL 4 CHAMBER INDEX BSA: 9 ML/M2
ECHO RA VOLUME: 14 ML
ECHO RV BASAL DIMENSION: 3 CM
ECHO RV FREE WALL PEAK S': 18.6 CM/S
ECHO RV LONGITUDINAL DIMENSION: 4.5 CM
ECHO RV MID DIMENSION: 2.1 CM
ECHO RV TAPSE: 2.6 CM (ref 1.7–?)
ECHO TV REGURGITANT MAX VELOCITY: 2.29 M/S
ECHO TV REGURGITANT PEAK GRADIENT: 21 MMHG

## 2025-04-10 PROCEDURE — 93306 TTE W/DOPPLER COMPLETE: CPT

## 2025-04-14 ENCOUNTER — TELEPHONE (OUTPATIENT)
Dept: CARDIOLOGY CLINIC | Age: 85
End: 2025-04-14

## 2025-04-14 ENCOUNTER — OFFICE VISIT (OUTPATIENT)
Dept: CARDIOLOGY CLINIC | Age: 85
End: 2025-04-14
Payer: MEDICARE

## 2025-04-14 VITALS
BODY MASS INDEX: 25.81 KG/M2 | WEIGHT: 136.7 LBS | OXYGEN SATURATION: 98 % | HEART RATE: 103 BPM | HEIGHT: 61 IN | SYSTOLIC BLOOD PRESSURE: 136 MMHG | DIASTOLIC BLOOD PRESSURE: 64 MMHG

## 2025-04-14 DIAGNOSIS — E78.2 MIXED HYPERLIPIDEMIA: ICD-10-CM

## 2025-04-14 DIAGNOSIS — I35.0 AORTIC VALVE STENOSIS, NONRHEUMATIC: Primary | ICD-10-CM

## 2025-04-14 DIAGNOSIS — I10 ESSENTIAL HYPERTENSION: ICD-10-CM

## 2025-04-14 DIAGNOSIS — Z95.2 S/P TAVR (TRANSCATHETER AORTIC VALVE REPLACEMENT): ICD-10-CM

## 2025-04-14 PROCEDURE — 3075F SYST BP GE 130 - 139MM HG: CPT | Performed by: INTERNAL MEDICINE

## 2025-04-14 PROCEDURE — 1123F ACP DISCUSS/DSCN MKR DOCD: CPT | Performed by: INTERNAL MEDICINE

## 2025-04-14 PROCEDURE — 3078F DIAST BP <80 MM HG: CPT | Performed by: INTERNAL MEDICINE

## 2025-04-14 PROCEDURE — G2211 COMPLEX E/M VISIT ADD ON: HCPCS | Performed by: INTERNAL MEDICINE

## 2025-04-14 PROCEDURE — 99214 OFFICE O/P EST MOD 30 MIN: CPT | Performed by: INTERNAL MEDICINE

## 2025-04-14 PROCEDURE — 1159F MED LIST DOCD IN RCRD: CPT | Performed by: INTERNAL MEDICINE

## 2025-04-14 RX ORDER — LOSARTAN POTASSIUM 25 MG/1
25 TABLET ORAL DAILY
Qty: 90 TABLET | Refills: 3 | Status: SHIPPED | OUTPATIENT
Start: 2025-04-14

## 2025-04-14 NOTE — TELEPHONE ENCOUNTER
The patient was seen today by ESTELITA and he would like her to return in 1 yr. She wants to come to the [place] location. Please advise. Thank you

## 2025-04-14 NOTE — TELEPHONE ENCOUNTER
Left message for patient to return call. Wanted to see if she could come to appointment today at 145.

## 2025-04-14 NOTE — PROGRESS NOTES
[facility]  Cardiology Consult    Bisi Colindres  1940    April 14, 2025      Reason for Referral: Aortic Stenosis    Referring physician: Mo Reese    CC: \"I am stressed out.\"      Subjective:     History of Present Illness:    Bisi Colindres is a 84 y.o. patient with a PMH significant for aortic stenosis s/p TAVR 4/9/2024, HTN, HLD,Left lower extremity DVT on OAC, and breast cancer      Today, she is here for follow up. She says that she is stressed out because her echo reported abnormal on MyChart. She has been checking her blood pressure at home. For the most part it is controlled but at times she gets and elevated reading. At times she does notice so swelling in her ankle. Patient denies exertional chest pain/pressure, dyspnea at rest, BROWER, PND, orthopnea, palpitations, lightheadedness, weight changes,  and syncope. Patient reports compliance to /her medications.    Past Medical History:   has a past medical history of Cancer (HCC), COVID-19, Dry eye, Hyperlipidemia, Hypertension, Murmur, heart, Salzmann nodular degeneration, and Sinus congestion.    Surgical History:   has a past surgical history that includes eye surgery (Bilateral); Tonsillectomy and adenoidectomy; US BREAST BIOPSY W LOC DEVICE 1ST LESION RIGHT (Right, 07/11/2023); US BREAST BIOPSY W LOC DEVICE EACH ADDL LESION RIGHT (Right, 07/11/2023); Mastectomy (Right, 09/06/2023); Dilation and curettage of uterus; Cardiac surgery (N/A, 04/09/2024); Cardiac surgery (N/A, 04/09/2024); and Eye surgery (02/07/2025).     Social History:   reports that she has never smoked. She has never used smokeless tobacco. She reports current alcohol use. She reports that she does not use drugs.     Family History:  family history includes Arthritis in her father; Asthma in her son; Breast Cancer in her maternal aunt and maternal cousin; Cancer in her father, maternal cousin, maternal uncle, maternal uncle, and maternal uncle; Cancer (age of

## 2025-04-19 SDOH — HEALTH STABILITY: PHYSICAL HEALTH: ON AVERAGE, HOW MANY DAYS PER WEEK DO YOU ENGAGE IN MODERATE TO STRENUOUS EXERCISE (LIKE A BRISK WALK)?: 3 DAYS

## 2025-04-19 SDOH — HEALTH STABILITY: PHYSICAL HEALTH: ON AVERAGE, HOW MANY MINUTES DO YOU ENGAGE IN EXERCISE AT THIS LEVEL?: 30 MIN

## 2025-04-19 ASSESSMENT — LIFESTYLE VARIABLES
HOW MANY STANDARD DRINKS CONTAINING ALCOHOL DO YOU HAVE ON A TYPICAL DAY: 1
HOW OFTEN DO YOU HAVE A DRINK CONTAINING ALCOHOL: 2
HOW OFTEN DO YOU HAVE A DRINK CONTAINING ALCOHOL: MONTHLY OR LESS
HOW OFTEN DO YOU HAVE SIX OR MORE DRINKS ON ONE OCCASION: 1
HOW MANY STANDARD DRINKS CONTAINING ALCOHOL DO YOU HAVE ON A TYPICAL DAY: 1 OR 2

## 2025-04-19 ASSESSMENT — PATIENT HEALTH QUESTIONNAIRE - PHQ9
SUM OF ALL RESPONSES TO PHQ QUESTIONS 1-9: 0
1. LITTLE INTEREST OR PLEASURE IN DOING THINGS: NOT AT ALL
SUM OF ALL RESPONSES TO PHQ QUESTIONS 1-9: 0
2. FEELING DOWN, DEPRESSED OR HOPELESS: NOT AT ALL
SUM OF ALL RESPONSES TO PHQ QUESTIONS 1-9: 0
SUM OF ALL RESPONSES TO PHQ QUESTIONS 1-9: 0

## 2025-04-23 ENCOUNTER — OFFICE VISIT (OUTPATIENT)
Dept: INTERNAL MEDICINE CLINIC | Age: 85
End: 2025-04-23

## 2025-04-23 VITALS
DIASTOLIC BLOOD PRESSURE: 64 MMHG | WEIGHT: 138.8 LBS | HEART RATE: 85 BPM | BODY MASS INDEX: 26.23 KG/M2 | OXYGEN SATURATION: 99 % | SYSTOLIC BLOOD PRESSURE: 132 MMHG

## 2025-04-23 DIAGNOSIS — C50.911 INVASIVE DUCTAL CARCINOMA OF BREAST, STAGE 2, RIGHT (HCC): ICD-10-CM

## 2025-04-23 DIAGNOSIS — Z95.3 STATUS POST TRANSCATHETER AORTIC VALVE REPLACEMENT (TAVR) USING BIOPROSTHESIS: ICD-10-CM

## 2025-04-23 DIAGNOSIS — Z00.00 MEDICARE ANNUAL WELLNESS VISIT, SUBSEQUENT: Primary | ICD-10-CM

## 2025-04-23 DIAGNOSIS — E78.00 PURE HYPERCHOLESTEROLEMIA: ICD-10-CM

## 2025-04-23 DIAGNOSIS — Z86.711 HISTORY OF PULMONARY EMBOLUS (PE): ICD-10-CM

## 2025-04-23 DIAGNOSIS — F41.1 GAD (GENERALIZED ANXIETY DISORDER): ICD-10-CM

## 2025-04-23 DIAGNOSIS — I10 ESSENTIAL HYPERTENSION: ICD-10-CM

## 2025-04-23 PROBLEM — J01.00 ACUTE NON-RECURRENT MAXILLARY SINUSITIS: Status: RESOLVED | Noted: 2025-04-08 | Resolved: 2025-04-23

## 2025-04-23 PROBLEM — I35.0 AORTIC STENOSIS, SEVERE: Status: RESOLVED | Noted: 2024-04-09 | Resolved: 2025-04-23

## 2025-04-23 RX ORDER — MAGNESIUM GLUCONATE 27 MG(500)
500 TABLET ORAL NIGHTLY
COMMUNITY

## 2025-04-23 ASSESSMENT — ENCOUNTER SYMPTOMS
COUGH: 0
NAUSEA: 0
BLOOD IN STOOL: 0
COLOR CHANGE: 0
BACK PAIN: 0
CHEST TIGHTNESS: 0
ABDOMINAL PAIN: 0
DIARRHEA: 0
SHORTNESS OF BREATH: 0
SORE THROAT: 0
WHEEZING: 0
SINUS PAIN: 0
VOMITING: 0
CONSTIPATION: 0

## 2025-04-23 NOTE — ASSESSMENT & PLAN NOTE
Blood pressure remains stable and fairly well-controlled on current medication regimen, continue same, continue attempts to adhere to low-salt diet and active lifestyle

## 2025-04-23 NOTE — ASSESSMENT & PLAN NOTE
Remains on long-term anticoagulation with good tolerance and no side effects, bleeding precautions discussed with patient

## 2025-04-23 NOTE — PROGRESS NOTES
Due: see orders and patient instructions/AVS.    1. Medicare annual wellness visit, subsequent  2. Essential hypertension  Assessment & Plan:  Blood pressure remains stable and fairly well-controlled on current medication regimen, continue same, continue attempts to adhere to low-salt diet and active lifestyle   3. TIFFANIE (generalized anxiety disorder)  Assessment & Plan:  Manageable and well-controlled off medication, continue same and call office if any relapse in symptoms   4. Invasive ductal carcinoma of breast, stage 2, right  Assessment & Plan:  Remains in remission and doing well on anastrozole, she will continue care and recommendation as per oncology and breast surgery.  Has been also on zoledronic acid infusions for osteoporosis prevention.  Continue calcium and vitamin D supplements along with daily weightbearing activity   5. Status post transcatheter aortic valve replacement (TAVR) using bioprosthesis  Assessment & Plan:  Stable, had updated echocardiogram recently by cardiology, continue current medications and follow-up with cardiology yearly as recommended .  She will continue antibiotic prophylaxis prior to dental procedures  6. Pure hypercholesterolemia  Assessment & Plan:  Last cholesterol panel is at target goal with current dose of pravastatin, continue same along with diet and exercise, will update labs next visit   7. History of pulmonary embolus (PE)  Assessment & Plan:  Remains on long-term anticoagulation with good tolerance and no side effects, bleeding precautions discussed with patient       Return in about 6 months (around 10/23/2025).    Recommended screening schedule for the next 5-10 years is provided to the patient in written form: see Patient Instructions/AVS.    Electronically signed by Mo Reese MD on 4/23/2025 at 10:30 AM.

## 2025-04-23 NOTE — ASSESSMENT & PLAN NOTE
Manageable and well-controlled off medication, continue same and call office if any relapse in symptoms

## 2025-04-23 NOTE — ASSESSMENT & PLAN NOTE
Last cholesterol panel is at target goal with current dose of pravastatin, continue same along with diet and exercise, will update labs next visit

## 2025-04-23 NOTE — ASSESSMENT & PLAN NOTE
Remains in remission and doing well on anastrozole, she will continue care and recommendation as per oncology and breast surgery.  Has been also on zoledronic acid infusions for osteoporosis prevention.  Continue calcium and vitamin D supplements along with daily weightbearing activity

## 2025-04-23 NOTE — ASSESSMENT & PLAN NOTE
Stable, had updated echocardiogram recently by cardiology, continue current medications and follow-up with cardiology yearly as recommended .  She will continue antibiotic prophylaxis prior to dental procedures

## 2025-05-02 NOTE — PROGRESS NOTES
[facility]      Cardiology Consult    Bisi Colindres  1940  May 1, 2025    Referring Physician:Cody Garner MD,   Reason for Referral: Venous insufficiency     CC: \"I feel great\"    HPI:  The patient is 84 y.o. female with a past medical history significant for breast cancer, hyperlipidemia, hypertension, post TAVR 4/9/24, Left Lower Extremity DVT.    Today she presents for follow up and states that overall she is feeling well. She denies any new sounding cardiac complaints. She denies any chest pains or worsening shortness of breath. She reports medication compliance and is tolerating. She denies any abnormal bleeding or bruising. She denies exertional chest pain/pressure, dyspnea at rest, worsening BROWER, PND, orthopnea, palpitations, lightheadedness, weight changes, changes in LE edema, and syncope.   No pain in leg at night.  I have some foot pain at night with numbness.      Past Medical History:   Diagnosis Date    Cancer (HCC)     breast    COVID-19     2020, 2022  mild    Dry eye     Hyperlipidemia     Hypertension     Murmur, heart     Salzmann nodular degeneration     Sinus congestion      Past Surgical History:   Procedure Laterality Date    CARDIAC SURGERY N/A 04/09/2024    TRANSCATHETER AORTIC VALVE REPLACEMENT FEMORAL APPROACH performed by Adams Plasencia MD at [facility]    CARDIAC SURGERY N/A 04/09/2024    TRANSCATHETER AORTIC VALVE REPLACEMENT FEMORAL APPROACH performed by Peter Haines MD at [facility]    DILATION AND CURETTAGE OF UTERUS      x3    EYE SURGERY Bilateral     cataract    EYE SURGERY  02/07/2025    SALZMANN NODULAR DEGENERATION right eye    MASTECTOMY Right 09/06/2023    RIGHT BREAST TOTAL MASTECTOMY, RIGHT SENTINEL LYMPH NODE BIOPSY, TECHNETIUM NINETY-NINE AND INJECTABLE BLUE DYE IN OPERATING ROOM performed by Jordyn Smith MD at [facility] OR    TONSILLECTOMY AND ADENOIDECTOMY      US BREAST BIOPSY W LOC DEVICE 1ST LESION RIGHT Right

## 2025-05-05 ENCOUNTER — OFFICE VISIT (OUTPATIENT)
Dept: CARDIOLOGY CLINIC | Age: 85
End: 2025-05-05

## 2025-05-05 ENCOUNTER — RESULTS FOLLOW-UP (OUTPATIENT)
Dept: CARDIOLOGY CLINIC | Age: 85
End: 2025-05-05

## 2025-05-05 VITALS
OXYGEN SATURATION: 97 % | BODY MASS INDEX: 26.04 KG/M2 | SYSTOLIC BLOOD PRESSURE: 152 MMHG | HEART RATE: 91 BPM | WEIGHT: 137.9 LBS | HEIGHT: 61 IN | DIASTOLIC BLOOD PRESSURE: 78 MMHG

## 2025-05-05 DIAGNOSIS — Z86.718 HISTORY OF DVT (DEEP VEIN THROMBOSIS): Primary | ICD-10-CM

## 2025-05-05 DIAGNOSIS — I82.402 DEEP VEIN THROMBOSIS (DVT) OF LEFT LOWER EXTREMITY, UNSPECIFIED CHRONICITY, UNSPECIFIED VEIN (HCC): ICD-10-CM

## 2025-05-05 RX ORDER — COLLAGEN, HYDROLYSATE (BOVINE) 100 %
POWDER (GRAM) MISCELLANEOUS
COMMUNITY

## 2025-05-05 RX ORDER — WHEY PROTEIN CONCENTRATE 20 G-120
POWDER (GRAM) ORAL
COMMUNITY

## 2025-05-05 NOTE — PATIENT INSTRUCTIONS
Call if varicose veins on upper leg if they get red and warm to touch    Continue to monitor legs for pain and swelling     Continue Xarelto

## 2025-06-23 ENCOUNTER — OFFICE VISIT (OUTPATIENT)
Dept: ENT CLINIC | Age: 85
End: 2025-06-23
Payer: MEDICARE

## 2025-06-23 VITALS
HEART RATE: 96 BPM | DIASTOLIC BLOOD PRESSURE: 84 MMHG | TEMPERATURE: 98.2 F | OXYGEN SATURATION: 98 % | HEIGHT: 61 IN | BODY MASS INDEX: 26.06 KG/M2 | WEIGHT: 138 LBS | SYSTOLIC BLOOD PRESSURE: 153 MMHG

## 2025-06-23 DIAGNOSIS — J31.0 CHRONIC RHINITIS: ICD-10-CM

## 2025-06-23 DIAGNOSIS — J34.89 NASAL OBSTRUCTION: ICD-10-CM

## 2025-06-23 DIAGNOSIS — H91.93 BILATERAL HEARING LOSS, UNSPECIFIED HEARING LOSS TYPE: ICD-10-CM

## 2025-06-23 DIAGNOSIS — H61.23 BILATERAL IMPACTED CERUMEN: Primary | ICD-10-CM

## 2025-06-23 PROCEDURE — 1123F ACP DISCUSS/DSCN MKR DOCD: CPT | Performed by: STUDENT IN AN ORGANIZED HEALTH CARE EDUCATION/TRAINING PROGRAM

## 2025-06-23 PROCEDURE — 69210 REMOVE IMPACTED EAR WAX UNI: CPT | Performed by: STUDENT IN AN ORGANIZED HEALTH CARE EDUCATION/TRAINING PROGRAM

## 2025-06-23 PROCEDURE — 3077F SYST BP >= 140 MM HG: CPT | Performed by: STUDENT IN AN ORGANIZED HEALTH CARE EDUCATION/TRAINING PROGRAM

## 2025-06-23 PROCEDURE — 99203 OFFICE O/P NEW LOW 30 MIN: CPT | Performed by: STUDENT IN AN ORGANIZED HEALTH CARE EDUCATION/TRAINING PROGRAM

## 2025-06-23 PROCEDURE — 3079F DIAST BP 80-89 MM HG: CPT | Performed by: STUDENT IN AN ORGANIZED HEALTH CARE EDUCATION/TRAINING PROGRAM

## 2025-06-23 PROCEDURE — 1159F MED LIST DOCD IN RCRD: CPT | Performed by: STUDENT IN AN ORGANIZED HEALTH CARE EDUCATION/TRAINING PROGRAM

## 2025-06-23 NOTE — PROGRESS NOTES
[facility]  DIVISION OF OTOLARYNGOLOGY- HEAD & NECK SURGERY  CONSULT      Bisi Colindres (:  1940) is a 84 y.o. female, here for evaluation of the following chief complaint(s):  Ear Problem (Ear cleaning)      ASSESSMENT/PLAN:  1. Bilateral impacted cerumen  2. Bilateral hearing loss, unspecified hearing loss type  3. Chronic rhinitis  4. Nasal obstruction         This is a very pleasant 84 y.o. female here today for evaluation of the the above-noted complaints.      Assessment & Plan  Patient had evidence of bilateral impacted cerumen on exam today.  Ears were debrided.  We discussed cerumen management strategies.    I recommend audiogram for her history of hearing loss.    We discussed that for her chronic rhinitis and nasal obstruction which is seasonal, that she should use fluticasone and an oral antihistamine.    Medical Decision Making:  The following items were considered in medical decision making:  Independent review of images  Review / order clinical lab tests  Review / order radiology tests  Decision to obtain old records  Review and summation of old records as accessed through [employer] if applicable    SUBJECTIVE/OBJECTIVE:  HPI    History of Present Illness  The patient is an 84-year-old female who presents for evaluation of ear wax and sinus issues.    Ear Wax and Hearing Loss  - Reports a sensation of hearing loss, which she attributes to the presence of ear wax.  - Recalls a consultation with Dr. Savage many years ago, during which she was informed of having a narrow ear canal.  - This condition has led to instances where the wax descends into the canal, necessitating removal.  - Describes this procedure as painful, particularly when water is used for irrigation.  - Undergoes irrigation annually but finds it uncomfortable due to the pressure exerted.  - Last visit to an ENT specialist was approximately 15 to 20 years ago.  - Has not undergone a recent hearing test.  - No history

## 2025-07-07 ENCOUNTER — PATIENT MESSAGE (OUTPATIENT)
Dept: INTERNAL MEDICINE CLINIC | Age: 85
End: 2025-07-07

## 2025-07-10 ENCOUNTER — OFFICE VISIT (OUTPATIENT)
Dept: INTERNAL MEDICINE CLINIC | Age: 85
End: 2025-07-10

## 2025-07-10 DIAGNOSIS — R73.01 IMPAIRED FASTING GLUCOSE: Primary | ICD-10-CM

## 2025-07-10 NOTE — PROGRESS NOTES
Patient attended Dietitian Group Class. Weight loss     Reviewed and patient demonstrated understanding of the following:  Building a healthier metabolism  Appropriate timing of meals and snacks  Calories and macronutrients  Using Nutrition Facts Labels  Serving sizes// portion control  MyPlate/ meal planning  Importance of exercise  Strategies to deal with social events, restaurant meals, and emotion-based eating  Goal Setting    REFERRING PROVIDER: Mary Ann    Total participants in Group:13

## 2025-07-14 NOTE — PROGRESS NOTES
Medical Oncology: Sarath  Radiation:  Other:        pT2N1a  STAGE:  IB right breast cancer      Ms. Colindres is a 84 y.o.-year-old woman who initially presented to me with  right breast cancer.  Since her last encounter Ms. Colindres has been doing quite well. Initially postop she was admitted to the hospital and identified to have extensive bilateral pulmonary emboli.  During her hospitalization she required CPR with rapid return of spontaneous circulation.  She underwent thrombectomies and is now on anticoagulation.      Her adjuvant treatment has included radiation and endocrine therapy.   She has no new breast related concerns today.        INTERVAL HISTORY:  On 9/6/2023 she underwent right mastectomy with sentinel lymph node biopsy.  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  ER positive LA positive HER2 negative.  Margins were negative.  Dermal involvement was noted without ulceration.  There is 1/4 lymph nodes involved with carcinoma. [id number]     Initiated on anastrozole    On 8/22/2023 she underwent genetic testing.  2 variants of uncertain significance were identified with no clinically actionable alterations.    On 7/18/2024 she underwent left screening mammography.  This was followed by diagnostic imaging for further evaluation of an asymmetry in the left breast.  The 1.2 cm asymmetry in the medial left breast disperses on spot compression.  No underlying suspicious mass is identified.  There are no new further concerning findings suspicious for malignancy.  BI-RADS 2.    On 7/21/2025 she underwent left screening mammography.  There are no new concerning findings suggestive of malignancy.  BI-RADS 1.    Exam:  Physical exam has been reviewed and updated  General: no acute distress  Breast:  The patient was examined in the upright and supine position. There is a well healed scar on the right chest wall.  There are expected  post surgical and radiation related changes.  She

## 2025-07-15 ENCOUNTER — OFFICE VISIT (OUTPATIENT)
Dept: INTERNAL MEDICINE CLINIC | Age: 85
End: 2025-07-15

## 2025-07-15 DIAGNOSIS — E78.00 PURE HYPERCHOLESTEROLEMIA: Primary | ICD-10-CM

## 2025-07-15 NOTE — PROGRESS NOTES
Patient attended Heart healthy Class  Reviewed and patient demonstrated understanding of the following:  Nutrition guidelines to maintain cardiovascular health  Included fat, sodium and fiber intake  Goal Setting    REFERRING PROVIDER: Andrew    Total participants in Group:11

## 2025-07-21 ENCOUNTER — OFFICE VISIT (OUTPATIENT)
Dept: SURGERY | Age: 85
End: 2025-07-21
Payer: MEDICARE

## 2025-07-21 ENCOUNTER — HOSPITAL ENCOUNTER (OUTPATIENT)
Dept: WOMENS IMAGING | Age: 85
Discharge: HOME | End: 2025-07-21
Payer: MEDICARE

## 2025-07-21 VITALS — BODY MASS INDEX: 22.82 KG/M2 | HEIGHT: 65 IN | WEIGHT: 137 LBS

## 2025-07-21 VITALS
WEIGHT: 141 LBS | OXYGEN SATURATION: 98 % | BODY MASS INDEX: 23.49 KG/M2 | RESPIRATION RATE: 16 BRPM | HEIGHT: 65 IN | HEART RATE: 98 BPM

## 2025-07-21 DIAGNOSIS — Z12.31 SCREENING MAMMOGRAM, ENCOUNTER FOR: ICD-10-CM

## 2025-07-21 DIAGNOSIS — Z85.3 ENCOUNTER FOR FOLLOW-UP SURVEILLANCE OF BREAST CANCER: ICD-10-CM

## 2025-07-21 DIAGNOSIS — Z85.3 PERSONAL HISTORY OF BREAST CANCER: ICD-10-CM

## 2025-07-21 DIAGNOSIS — Z08 ENCOUNTER FOR FOLLOW-UP SURVEILLANCE OF BREAST CANCER: ICD-10-CM

## 2025-07-21 DIAGNOSIS — Z12.39 SCREENING BREAST EXAMINATION: ICD-10-CM

## 2025-07-21 DIAGNOSIS — C50.911 PRIMARY BREAST MALIGNANCY, RIGHT (HCC): Primary | ICD-10-CM

## 2025-07-21 PROCEDURE — 77063 BREAST TOMOSYNTHESIS BI: CPT

## 2025-07-21 PROCEDURE — 1126F AMNT PAIN NOTED NONE PRSNT: CPT | Performed by: SURGERY

## 2025-07-21 PROCEDURE — 1159F MED LIST DOCD IN RCRD: CPT | Performed by: SURGERY

## 2025-07-21 PROCEDURE — 99213 OFFICE O/P EST LOW 20 MIN: CPT | Performed by: SURGERY

## 2025-07-21 PROCEDURE — 1123F ACP DISCUSS/DSCN MKR DOCD: CPT | Performed by: SURGERY

## 2025-08-05 ENCOUNTER — PROCEDURE VISIT (OUTPATIENT)
Dept: AUDIOLOGY | Age: 85
End: 2025-08-05

## 2025-08-05 DIAGNOSIS — H90.3 SENSORINEURAL HEARING LOSS (SNHL) OF BOTH EARS: Primary | ICD-10-CM

## (undated) DEVICE — GOWN SIRUS NONREIN XL W/TWL: Brand: MEDLINE INDUSTRIES, INC.

## (undated) DEVICE — PROBE SET W/ DRP

## (undated) DEVICE — NEEDLE,22GX1.5",REG,BEVEL: Brand: MEDLINE

## (undated) DEVICE — 3M™ TEGADERM™ TRANSPARENT FILM DRESSING FRAME STYLE, 1624W, 2-3/8 IN X 2-3/4 IN (6 CM X 7 CM), 100/CT 4CT/CASE: Brand: 3M™ TEGADERM™

## (undated) DEVICE — SUTURE VCRL + SZ 3-0 L18IN ABSRB UD SH 1/2 CIR TAPERCUT NDL VCP864D

## (undated) DEVICE — SUTURE MCRYL + SZ 3-0 L27IN ABSRB UD L26MM SH 1/2 CIR MCP416H

## (undated) DEVICE — SUTURE ETHLN SZ 3-0 L18IN NONABSORBABLE BLK PS-2 L19MM 3/8 1669H

## (undated) DEVICE — STAPLER SKIN H3.9MM WIRE DIA0.58MM CRWN 6.9MM 35 STPL ROT

## (undated) DEVICE — SPONGE LAP W18XL18IN WHT COT 4 PLY FLD STRUNG RADPQ DISP ST 2 PER PACK

## (undated) DEVICE — 3M™ IOBAN™ 2 ANTIMICROBIAL INCISE DRAPE 6650EZ: Brand: IOBAN™ 2

## (undated) DEVICE — PENCIL SMK EVAC TELSCP 3 M TBNG

## (undated) DEVICE — SYRINGE MED 5ML STD CLR PLAS LUERLOCK TIP N CTRL DISP

## (undated) DEVICE — SYRINGE MED 10ML TRNSLUC BRL PLUNG BLK MRK POLYPR CTRL

## (undated) DEVICE — GLOVE SURG SZ 7 L12IN THK7.5MIL DK GRN LTX FREE MSG6570] MEDLINE INDUSTRIES INC]

## (undated) DEVICE — Device

## (undated) DEVICE — APPLIER CLP L9.38IN M LIG TI DISP STR RNG HNDL LIGACLP

## (undated) DEVICE — GAUZE,SPONGE,4"X4",8PLY,STRL,LF,10/TRAY: Brand: MEDLINE

## (undated) DEVICE — DRAIN SURG 15FR RND FULL FLUT

## (undated) DEVICE — PROVE COVER: Brand: UNBRANDED

## (undated) DEVICE — SUTURE VCRL + SZ 3-0 L27IN ABSRB UD L26MM SH 1/2 CIR VCP416H

## (undated) DEVICE — GLOVE SURG SZ 6.5 L11.2IN FNGR THK9.8MIL STRW LTX POLYMER

## (undated) DEVICE — YANKAUER,BULB TIP,W/O VENT,RIGID,STERILE: Brand: MEDLINE

## (undated) DEVICE — SPONGE,PEANUT,XRAY,ST,SM,3/8",5/CARD: Brand: MEDLINE INDUSTRIES, INC.

## (undated) DEVICE — LIQUIBAND RAPID ADHESIVE 36/CS 0.8ML: Brand: MEDLINE

## (undated) DEVICE — SOLUTION SODIUM CHL 0.9% 500 ML IRRI BTL

## (undated) DEVICE — SPONGE DRN W4XL4IN RAYON/POLYESTER 6 PLY NONWOVEN PRECUT 2 PER PK

## (undated) DEVICE — HYPODERMIC SAFETY NEEDLE: Brand: MAGELLAN

## (undated) DEVICE — SYRINGE, LUER LOCK, 10ML: Brand: MEDLINE

## (undated) DEVICE — BLANKET WRM W40.2XL55.9IN IORT LO BODY + MISTRAL AIR

## (undated) DEVICE — SHEET,DRAPE,53X77,STERILE: Brand: MEDLINE

## (undated) DEVICE — BLADE ES ELASTOMERIC COAT INSUL DURABLE BEND UPTO 90DEG

## (undated) DEVICE — MASC TURNOVER KIT: Brand: MEDLINE INDUSTRIES, INC.

## (undated) DEVICE — SUTURE MCRYL + SZ 4-0 L27IN ABSRB UD L19MM PS-2 3/8 CIR MCP426H

## (undated) DEVICE — INTENDED USE FOR SURGICAL MARKING ON INTACT SKIN, ALSO PROVIDES A PERMANENT METHOD OF IDENTIFYING OBJECTS IN THE OPERATING ROOM: Brand: WRITESITE® PLUS MINI PREP RESISTANT MARKER

## (undated) DEVICE — 1LYRTR 16FR10ML 100%SILI SNAP: Brand: MEDLINE INDUSTRIES, INC.

## (undated) DEVICE — INTENDED FOR TISSUE SEPARATION, AND OTHER PROCEDURES THAT REQUIRE A SHARP SURGICAL BLADE TO PUNCTURE OR CUT.: Brand: BARD-PARKER ® STAINLESS STEEL BLADES

## (undated) DEVICE — OPTIFOAM GENTLE LIQUITRAP, SACRUM, 7"X7": Brand: MEDLINE

## (undated) DEVICE — APPLICATOR MEDICATED 26 CC SOLUTION HI LT ORNG CHLORAPREP

## (undated) DEVICE — ADAPTER MON OD15X22MM ID15MM STD LUER FIT W/ LIFESAVER

## (undated) DEVICE — DRESSING GERM 6-12FR DIA1IN CNTR H 4MM ANTIMIC PROTCT DISK

## (undated) DEVICE — SOLUTION IRRIG 500ML 0.9% SOD CHLO USP POUR PLAS BTL

## (undated) DEVICE — GLOVE ORANGE PI 7 1/2   MSG9075

## (undated) DEVICE — MAJOR SET UP PK

## (undated) DEVICE — DRAPE,CHEST,FENES,15X10,STERIL: Brand: MEDLINE